# Patient Record
Sex: FEMALE | Race: WHITE | NOT HISPANIC OR LATINO | Employment: OTHER | URBAN - METROPOLITAN AREA
[De-identification: names, ages, dates, MRNs, and addresses within clinical notes are randomized per-mention and may not be internally consistent; named-entity substitution may affect disease eponyms.]

---

## 2017-01-27 ENCOUNTER — OFFICE VISIT (OUTPATIENT)
Dept: INTERNAL MEDICINE | Facility: CLINIC | Age: 82
End: 2017-01-27
Payer: COMMERCIAL

## 2017-01-27 VITALS
DIASTOLIC BLOOD PRESSURE: 80 MMHG | WEIGHT: 214.94 LBS | HEART RATE: 102 BPM | HEIGHT: 67 IN | SYSTOLIC BLOOD PRESSURE: 126 MMHG | TEMPERATURE: 98 F | BODY MASS INDEX: 33.74 KG/M2 | RESPIRATION RATE: 20 BRPM

## 2017-01-27 DIAGNOSIS — B02.8 HERPES ZOSTER WITH COMPLICATION: Primary | ICD-10-CM

## 2017-01-27 PROCEDURE — 3074F SYST BP LT 130 MM HG: CPT | Mod: S$GLB,,, | Performed by: INTERNAL MEDICINE

## 2017-01-27 PROCEDURE — 1157F ADVNC CARE PLAN IN RCRD: CPT | Mod: S$GLB,,, | Performed by: INTERNAL MEDICINE

## 2017-01-27 PROCEDURE — 99999 PR PBB SHADOW E&M-EST. PATIENT-LVL III: CPT | Mod: PBBFAC,,, | Performed by: INTERNAL MEDICINE

## 2017-01-27 PROCEDURE — 1160F RVW MEDS BY RX/DR IN RCRD: CPT | Mod: S$GLB,,, | Performed by: INTERNAL MEDICINE

## 2017-01-27 PROCEDURE — 1159F MED LIST DOCD IN RCRD: CPT | Mod: S$GLB,,, | Performed by: INTERNAL MEDICINE

## 2017-01-27 PROCEDURE — 1125F AMNT PAIN NOTED PAIN PRSNT: CPT | Mod: S$GLB,,, | Performed by: INTERNAL MEDICINE

## 2017-01-27 PROCEDURE — 3079F DIAST BP 80-89 MM HG: CPT | Mod: S$GLB,,, | Performed by: INTERNAL MEDICINE

## 2017-01-27 PROCEDURE — 99213 OFFICE O/P EST LOW 20 MIN: CPT | Mod: S$GLB,,, | Performed by: INTERNAL MEDICINE

## 2017-01-27 RX ORDER — VALACYCLOVIR HYDROCHLORIDE 1 G/1
1000 TABLET, FILM COATED ORAL 3 TIMES DAILY
Qty: 21 TABLET | Refills: 0 | Status: SHIPPED | OUTPATIENT
Start: 2017-01-27 | End: 2017-03-09 | Stop reason: ALTCHOICE

## 2017-01-27 RX ORDER — GABAPENTIN 300 MG/1
300 CAPSULE ORAL 3 TIMES DAILY
Qty: 90 CAPSULE | Refills: 1 | Status: SHIPPED | OUTPATIENT
Start: 2017-01-27 | End: 2017-03-09 | Stop reason: ALTCHOICE

## 2017-01-27 NOTE — PROGRESS NOTES
Subjective:       Patient ID: Harriet Rutherford is a 81 y.o. female.    Chief Complaint: Rash (buttocks and knee--- left side--- )    Patient is a 81 y.o.female who presents today for rash. It is present on her buttock and knee. It is patchy behind the knee but she feels the pain/ burning sensation in her buttock, left side as well.     Review of Systems   Constitutional: Negative for appetite change, chills, diaphoresis, fatigue and fever.   HENT: Negative for congestion, dental problem, ear discharge, ear pain, hearing loss, postnasal drip, sinus pressure and sore throat.    Eyes: Negative for discharge, redness and itching.   Respiratory: Negative for cough, chest tightness, shortness of breath and wheezing.    Cardiovascular: Negative for chest pain, palpitations and leg swelling.   Gastrointestinal: Negative for abdominal pain, constipation, diarrhea, nausea and vomiting.   Endocrine: Negative for cold intolerance and heat intolerance.   Genitourinary: Negative for difficulty urinating, frequency, hematuria and urgency.   Musculoskeletal: Negative for arthralgias, back pain, gait problem, myalgias and neck pain.   Skin: Positive for rash. Negative for color change.   Neurological: Negative for dizziness, syncope and headaches.   Hematological: Negative for adenopathy.   Psychiatric/Behavioral: Negative for behavioral problems and sleep disturbance. The patient is not nervous/anxious.        Objective:      Physical Exam   Constitutional: She is oriented to person, place, and time. She appears well-developed and well-nourished. No distress.   HENT:   Head: Normocephalic and atraumatic.   Right Ear: External ear normal.   Left Ear: External ear normal.   Eyes: Conjunctivae and EOM are normal. Pupils are equal, round, and reactive to light. Right eye exhibits no discharge. Left eye exhibits no discharge. No scleral icterus.   Neck: Normal range of motion. Neck supple. No JVD present. No thyromegaly present.    Cardiovascular: Normal rate, regular rhythm, normal heart sounds and intact distal pulses.  Exam reveals no gallop and no friction rub.    No murmur heard.  Pulmonary/Chest: Effort normal and breath sounds normal. No stridor. No respiratory distress. She has no wheezes. She has no rales. She exhibits no tenderness.   Abdominal: Soft. Bowel sounds are normal. She exhibits no distension. There is no tenderness. There is no rebound.   Musculoskeletal: Normal range of motion. She exhibits no edema or tenderness.   Lymphadenopathy:     She has no cervical adenopathy.   Neurological: She is alert and oriented to person, place, and time.   Skin: Skin is warm. Rash noted. She is not diaphoretic. No erythema.        Psychiatric: She has a normal mood and affect. Her behavior is normal.   Nursing note and vitals reviewed.      Assessment and Plan:       1. Herpes zoster with complication  - valtrex 1000 mg po tid x 7 days  - gabapentin 300 mg po tid prn pain  - Notify clinic if symptoms change, worsen or do not improve        Return in about 3 months (around 4/27/2017) for annual.

## 2017-02-14 ENCOUNTER — OFFICE VISIT (OUTPATIENT)
Dept: ENDOCRINOLOGY | Facility: CLINIC | Age: 82
End: 2017-02-14
Payer: COMMERCIAL

## 2017-02-14 VITALS
WEIGHT: 215.38 LBS | HEIGHT: 67 IN | HEART RATE: 101 BPM | DIASTOLIC BLOOD PRESSURE: 82 MMHG | SYSTOLIC BLOOD PRESSURE: 128 MMHG | BODY MASS INDEX: 33.8 KG/M2

## 2017-02-14 DIAGNOSIS — I15.2 HYPERTENSION DUE TO ENDOCRINE DISORDER: Primary | Chronic | ICD-10-CM

## 2017-02-14 DIAGNOSIS — D35.00 ADRENAL ADENOMA, UNSPECIFIED LATERALITY: ICD-10-CM

## 2017-02-14 PROCEDURE — 1159F MED LIST DOCD IN RCRD: CPT | Mod: S$GLB,,, | Performed by: INTERNAL MEDICINE

## 2017-02-14 PROCEDURE — 1126F AMNT PAIN NOTED NONE PRSNT: CPT | Mod: S$GLB,,, | Performed by: INTERNAL MEDICINE

## 2017-02-14 PROCEDURE — 99999 PR PBB SHADOW E&M-EST. PATIENT-LVL III: CPT | Mod: PBBFAC,,, | Performed by: INTERNAL MEDICINE

## 2017-02-14 PROCEDURE — 1157F ADVNC CARE PLAN IN RCRD: CPT | Mod: S$GLB,,, | Performed by: INTERNAL MEDICINE

## 2017-02-14 PROCEDURE — 99214 OFFICE O/P EST MOD 30 MIN: CPT | Mod: S$GLB,,, | Performed by: INTERNAL MEDICINE

## 2017-02-14 PROCEDURE — 3079F DIAST BP 80-89 MM HG: CPT | Mod: S$GLB,,, | Performed by: INTERNAL MEDICINE

## 2017-02-14 PROCEDURE — 1160F RVW MEDS BY RX/DR IN RCRD: CPT | Mod: S$GLB,,, | Performed by: INTERNAL MEDICINE

## 2017-02-14 PROCEDURE — 3074F SYST BP LT 130 MM HG: CPT | Mod: S$GLB,,, | Performed by: INTERNAL MEDICINE

## 2017-02-14 NOTE — PROGRESS NOTES
Subjective:      Patient ID: Harriet Rutherford is a 81 y.o. female.    Chief Complaint:  Hypothyroidism, pheochromocytoma, adrenal adenoma, hypothyrodism      History of Present Illness  Harrite Rutherford is a 81 y.o. female with medical history of adrenal adenoma and hypothyroidism. The patient states that during the MRI of the abdomen, it was noted that she had 0.2 cm left adrenal mass. This was followed up by CT scan and the CT scan confirmed that this is an adrenal adenoma. This was probably multiple small adrenal adenomas, washout being 77%. Recent urine metanephrine was positive, MIBG was done: Bilateral uptake in the adrenal glands worrisome for MIBG positive neoplasms. Pt is on chronic TCA (imipramine) for years. Patient denies headache, hyperhidrosis. The patient does have a history of hypertension for a number of years. She has at present well controlled blood pressure on amlodipine 10 mg daily and losartan 100 mg daily.     Hypothyroidism: last 0.69. Current levothyroxine dose at 150mcg. Denies signs of hyperthyroidism or hypothyroidism     She has osteoporosis of lumbar spine having had a compression fracture in the past at L2. The only cancer that she has had in the past is cervical cancer in [id number].       Review of Systems   Constitutional: Positive for activity change and fatigue. Negative for fever and unexpected weight change.   HENT: Negative for facial swelling, trouble swallowing and voice change.    Eyes: Negative for visual disturbance.   Respiratory: Positive for shortness of breath. Negative for cough, chest tightness and wheezing.    Cardiovascular: Negative for chest pain and palpitations.   Gastrointestinal: Negative for abdominal pain, constipation, diarrhea and nausea.   Endocrine: Negative.    Genitourinary: Positive for frequency and urgency.   Musculoskeletal: Positive for arthralgias and joint swelling.   Neurological: Negative for dizziness, syncope, speech difficulty, weakness and headaches.    Psychiatric/Behavioral: Positive for dysphoric mood and sleep disturbance. The patient is not nervous/anxious.        Objective:   Physical Exam   HENT:   Head: Normocephalic and atraumatic.   Neck: Normal range of motion. Neck supple. No thyromegaly present.   Cardiovascular: Normal rate, regular rhythm and normal heart sounds.  Exam reveals no friction rub.    No murmur heard.  Pulmonary/Chest: Effort normal and breath sounds normal. She has no wheezes. She has no rales.   Abdominal: Soft. Bowel sounds are normal. There is no tenderness.   Lymphadenopathy:     She has no cervical adenopathy.   Neuromuscular:Normal gait and stance. Not weak.  Right lower ext edematous    Lab Review:   No visits with results within 2 Month(s) from this visit.  Latest known visit with results is:    Lab Visit on 10/12/2016   Component Date Value    Sodium 10/12/2016 142     Potassium 10/12/2016 4.3     Chloride 10/12/2016 105     CO2 10/12/2016 27     Glucose 10/12/2016 103     BUN, Bld 10/12/2016 16     Creatinine 10/12/2016 0.8     Calcium 10/12/2016 9.4     Total Protein 10/12/2016 6.8     Albumin 10/12/2016 3.6     Total Bilirubin 10/12/2016 0.5     Alkaline Phosphatase 10/12/2016 158*    AST 10/12/2016 19     ALT 10/12/2016 24     Anion Gap 10/12/2016 10     eGFR if African American 10/12/2016 >60.0     eGFR if non African Amer* 10/12/2016 >60.0     Cholesterol 10/12/2016 193     Triglycerides 10/12/2016 157*    HDL 10/12/2016 57     LDL Cholesterol 10/12/2016 104.6     HDL/Chol Ratio 10/12/2016 29.5     Total Cholesterol/HDL Ra* 10/12/2016 3.4     Non-HDL Cholesterol 10/12/2016 136     TSH 10/12/2016 4.046*    Alkaline Phosphatase, To* 10/12/2016 137*    Bone %, Alk Phos 10/12/2016 30     Bone, Alk Phos 10/12/2016 41     Liver, Alk Phos 10/12/2016 86     Liver 1, Alk Phos 10/12/2016 63     Intestine %, Alk Phos 10/12/2016 7     Intestine, Alk Phos 10/12/2016 10     GGT 10/12/2016 49     Free  T4 10/12/2016 1.24        Assessment:     No diagnosis found.     Plan:     Adrenal adenoma, left  - test points toward pheochromocytoma, MIBG test was positive for bilat  - at this time after discussion with Dr. Moralez, we think that the MIBG was over read, unlike that she has bilat pheo, she also does not have malignant hypertension. No electrolytes abn, no headache  - Reassured patient and family that at this time that we will monitor, no indication for surgery  - she does take chronic TCA and that is an interfering with her metanephrine levels. Unclear why she is on TCA, bladder issue vs depression, pt herself does not know. Will not stop medication at this time  - Will follow closely with metanephrine  -     METANEPHRINES, PLASMA FREE; Future; Expected date: 8/11/16    Hypothyroidism, unspecified hypothyroidism type  - Controlled, checking TSH  -     TSH; Future; Expected date: 8/11/16    RTC in 6 mo with labs or earlier if needed

## 2017-02-14 NOTE — MR AVS SNAPSHOT
[facility]  [address]  [facility] [address]  Phone: [phone]  Fax: [phone]                  Harriet Rutherford   2017 3:00 PM   Office Visit    Description:  Female : 1935   Provider:  Hayden Moralez II, MD   Department:  [facility]ab           Reason for Visit     Adrenal Adenoma           Diagnoses this Visit        Comments    Hypertension due to endocrine disorder    -  Primary     Adrenal adenoma, unspecified laterality                To Do List           Future Appointments        Provider Department Dept Phone    2017 3:40 PM LAB, APPOINTMENT [place] [facility] [phone]    2017 12:30 PM [facility] CT1 64- LIMIT 450 LBS [facility] [phone]      Goals (5 Years of Data)     None      [facility]     [facility] Nurse Care Line -  Assistance  Registered nurses in the [facility] provide clinical advisement, health education, appointment booking, and other advisory services.  Call for this free service at [phone].             Medications           Message regarding Medications     Verify the changes and/or additions to your medication regime listed below are the same as discussed with your clinician today.  If any of these changes or additions are incorrect, please notify your healthcare provider.             Verify that the below list of medications is an accurate representation of the medications you are currently taking.  If none reported, the list may be blank. If incorrect, please contact your healthcare provider. Carry this list with you in case of emergency.           Current Medications     alendronate (FOSAMAX) 70 MG tablet Take 1 tablet once weekly in the morning with a full glass of water on an empty stomach. Do not consume food or lie down for at least 30 minutes afterwards.    amlodipine (NORVASC) 10 MG tablet Take 1 tablet (10 mg total) by mouth  "once daily.    aspirin (ECOTRIN) 81 MG EC tablet Take 81 mg by mouth once daily.    atorvastatin (LIPITOR) 40 MG tablet Take 1 tablet (40 mg total) by mouth once daily.    CA CITRATE/MGOX/VIT D3/B6/MIN (CITRACAL PLUS ORAL) Take 1,200 mg by mouth once daily.    escitalopram oxalate (LEXAPRO) 20 MG tablet Take 1 tablet (20 mg total) by mouth once daily.    gabapentin (NEURONTIN) 300 MG capsule Take 1 capsule (300 mg total) by mouth 3 (three) times daily.    hydrOXYzine HCl (ATARAX) 25 MG tablet Take 1 tablet (25 mg total) by mouth 3 (three) times daily as needed for Itching.    imipramine (TOFRANIL) 25 MG tablet TAKE 1 TABLET BY MOUTH THREE TIMES A DAY    levothyroxine (SYNTHROID) 150 MCG tablet Take 1 tablet (150 mcg total) by mouth once daily.    losartan (COZAAR) 100 MG tablet Take 1 tablet (100 mg total) by mouth once daily.    multivitamin capsule Take 1 capsule by mouth once daily.    tolterodine (DETROL LA) 4 MG 24 hr capsule Take 1 capsule (4 mg total) by mouth once daily.    valacyclovir (VALTREX) 1000 MG tablet Take 1 tablet (1,000 mg total) by mouth 3 (three) times daily.    zolpidem (AMBIEN) 5 MG Tab TAKE 1 TABLET AT BEDTIME AS NEEDED FOR INSOMNIA.           Clinical Reference Information           Your Vitals Were     BP Pulse Height Weight BMI    128/82 (BP Location: Right arm, Patient Position: Sitting, BP Method: Manual) 101 5' 7" (1.702 m) 97.7 kg (215 lb 6.2 oz) 33.73 kg/m2      Blood Pressure          Most Recent Value    BP  128/82      Allergies as of 2/14/2017     Lisinopril    Sulfur    Sulfa (Sulfonamide Antibiotics)      Immunizations Administered on Date of Encounter - 2/14/2017     None      Orders Placed During Today's Visit     Future Labs/Procedures Expected by Expires    ACTH  2/14/2017 4/15/2018    Cortisol  2/14/2017 4/15/2018    Creatinine, serum  2/14/2017 4/15/2018    Creatinine, serum  2/14/2017 4/15/2018    CT Abdomen Pelvis W Wo Contrast  2/14/2017 2/14/2018    METANEPHRINES, " PLASMA FREE  2/14/2017 4/15/2018      Language Assistance Services     ATTENTION: Language assistance services are available, free of charge. Please call [phone].      ATENCIÓN: Si habla miguel, tiene a quiroz disposición servicios gratuitos de asistencia lingüística. Llame al [phone].     CHÚ Ý: N?u b?n nói Ti?ng Vi?t, có các d?ch v? h? tr? ngôn ng? mi?n phí dành cho b?n. G?i s? [phone].         [facility] complies with applicable Federal civil rights laws and does not discriminate on the basis of race, color, national origin, age, disability, or sex.

## 2017-02-17 RX ORDER — ZOLPIDEM TARTRATE 5 MG/1
TABLET ORAL
Qty: 30 TABLET | Refills: 3 | Status: SHIPPED | OUTPATIENT
Start: 2017-02-17 | End: 2017-03-09

## 2017-02-18 ENCOUNTER — HOSPITAL ENCOUNTER (OUTPATIENT)
Dept: RADIOLOGY | Facility: HOSPITAL | Age: 82
Discharge: HOME | End: 2017-02-18
Attending: INTERNAL MEDICINE
Payer: COMMERCIAL

## 2017-02-18 DIAGNOSIS — D35.00 ADRENAL ADENOMA, UNSPECIFIED LATERALITY: ICD-10-CM

## 2017-02-18 DIAGNOSIS — I15.2 HYPERTENSION DUE TO ENDOCRINE DISORDER: Chronic | ICD-10-CM

## 2017-02-18 PROCEDURE — 74178 CT ABD&PLV WO CNTR FLWD CNTR: CPT | Mod: 26,,, | Performed by: RADIOLOGY

## 2017-02-18 PROCEDURE — 74178 CT ABD&PLV WO CNTR FLWD CNTR: CPT | Mod: TC

## 2017-02-18 PROCEDURE — 25500020 PHARM REV CODE 255: Performed by: INTERNAL MEDICINE

## 2017-02-18 RX ADMIN — IOHEXOL 100 ML: 350 INJECTION, SOLUTION INTRAVENOUS at 12:02

## 2017-02-18 RX ADMIN — IOHEXOL 15 ML: 350 INJECTION, SOLUTION INTRAVENOUS at 12:02

## 2017-03-07 ENCOUNTER — TELEPHONE (OUTPATIENT)
Dept: INTERNAL MEDICINE | Facility: CLINIC | Age: 82
End: 2017-03-07

## 2017-03-07 DIAGNOSIS — Z00.00 ANNUAL PHYSICAL EXAM: Primary | ICD-10-CM

## 2017-03-07 NOTE — TELEPHONE ENCOUNTER
----- Message from Aroldo Vázquez sent at 3/7/2017  4:18 PM CST -----  Contact: self [phone]  Doctor appointment and lab have been scheduled.  Please link lab orders to the lab appointment.  Date of doctor appointment:  05/08  Physical or EP:  Physical   Date of lab appointment:  05/03  Comments:

## 2017-03-09 ENCOUNTER — HOSPITAL ENCOUNTER (OUTPATIENT)
Dept: RADIOLOGY | Facility: HOSPITAL | Age: 82
Discharge: HOME | End: 2017-03-09
Attending: INTERNAL MEDICINE
Payer: COMMERCIAL

## 2017-03-09 ENCOUNTER — OFFICE VISIT (OUTPATIENT)
Dept: INTERNAL MEDICINE | Facility: CLINIC | Age: 82
End: 2017-03-09
Payer: COMMERCIAL

## 2017-03-09 VITALS
HEIGHT: 67 IN | SYSTOLIC BLOOD PRESSURE: 118 MMHG | RESPIRATION RATE: 20 BRPM | DIASTOLIC BLOOD PRESSURE: 84 MMHG | TEMPERATURE: 98 F | WEIGHT: 220.69 LBS | HEART RATE: 68 BPM | BODY MASS INDEX: 34.64 KG/M2

## 2017-03-09 DIAGNOSIS — G47.00 INSOMNIA, UNSPECIFIED TYPE: ICD-10-CM

## 2017-03-09 DIAGNOSIS — M79.89 SWELLING OF LOWER EXTREMITY: Primary | ICD-10-CM

## 2017-03-09 DIAGNOSIS — M79.89 SWELLING OF LOWER EXTREMITY: ICD-10-CM

## 2017-03-09 DIAGNOSIS — R53.83 FATIGUE, UNSPECIFIED TYPE: ICD-10-CM

## 2017-03-09 PROCEDURE — 71020 XR CHEST PA AND LATERAL: CPT | Mod: TC,PO

## 2017-03-09 PROCEDURE — 71020 XR CHEST PA AND LATERAL: CPT | Mod: 26,,, | Performed by: RADIOLOGY

## 2017-03-09 PROCEDURE — 3079F DIAST BP 80-89 MM HG: CPT | Mod: S$GLB,,, | Performed by: INTERNAL MEDICINE

## 2017-03-09 PROCEDURE — 1160F RVW MEDS BY RX/DR IN RCRD: CPT | Mod: S$GLB,,, | Performed by: INTERNAL MEDICINE

## 2017-03-09 PROCEDURE — 1157F ADVNC CARE PLAN IN RCRD: CPT | Mod: S$GLB,,, | Performed by: INTERNAL MEDICINE

## 2017-03-09 PROCEDURE — 99214 OFFICE O/P EST MOD 30 MIN: CPT | Mod: S$GLB,,, | Performed by: INTERNAL MEDICINE

## 2017-03-09 PROCEDURE — 99999 PR PBB SHADOW E&M-EST. PATIENT-LVL III: CPT | Mod: PBBFAC,,, | Performed by: INTERNAL MEDICINE

## 2017-03-09 PROCEDURE — 1126F AMNT PAIN NOTED NONE PRSNT: CPT | Mod: S$GLB,,, | Performed by: INTERNAL MEDICINE

## 2017-03-09 PROCEDURE — 1159F MED LIST DOCD IN RCRD: CPT | Mod: S$GLB,,, | Performed by: INTERNAL MEDICINE

## 2017-03-09 PROCEDURE — 3074F SYST BP LT 130 MM HG: CPT | Mod: S$GLB,,, | Performed by: INTERNAL MEDICINE

## 2017-03-09 RX ORDER — TEMAZEPAM 30 MG/1
30 CAPSULE ORAL NIGHTLY PRN
Qty: 30 CAPSULE | Refills: 0 | Status: SHIPPED | OUTPATIENT
Start: 2017-03-09 | End: 2017-04-09 | Stop reason: SDUPTHER

## 2017-03-09 NOTE — PROGRESS NOTES
Subjective:       Patient ID: Harriet Rutherford is a 81 y.o. female.    Chief Complaint: Edema (ankles)    Patient is a 81 y.o.female who presents today for leg swelling. It has been ongoing for 2 weeks. She denies increased salt intake. She denies shortness of breath, orthopnea or cough. She states that the swelling is always worse in the evening. She takes her amlodipine in the morning.  Review of Systems   Constitutional: Negative for appetite change, chills, diaphoresis, fatigue and fever.   HENT: Negative for congestion, dental problem, ear discharge, ear pain, hearing loss, postnasal drip, sinus pressure and sore throat.    Eyes: Negative for discharge, redness and itching.   Respiratory: Negative for cough, chest tightness and wheezing.    Cardiovascular: Positive for leg swelling. Negative for chest pain and palpitations.   Gastrointestinal: Negative for abdominal pain, constipation, diarrhea, nausea and vomiting.   Endocrine: Negative for cold intolerance and heat intolerance.   Genitourinary: Negative for difficulty urinating, frequency, hematuria and urgency.   Musculoskeletal: Negative for arthralgias, back pain, gait problem, myalgias and neck pain.   Skin: Negative for color change and rash.   Neurological: Negative for dizziness, syncope and headaches.   Hematological: Negative for adenopathy.   Psychiatric/Behavioral: Negative for behavioral problems and sleep disturbance. The patient is not nervous/anxious.        Objective:      Physical Exam   Constitutional: She is oriented to person, place, and time. She appears well-developed and well-nourished. No distress.   HENT:   Head: Normocephalic and atraumatic.   Right Ear: External ear normal.   Left Ear: External ear normal.   Eyes: Conjunctivae and EOM are normal. Pupils are equal, round, and reactive to light. Right eye exhibits no discharge. Left eye exhibits no discharge. No scleral icterus.   Neck: Normal range of motion. Neck supple. No JVD present.  No thyromegaly present.   Cardiovascular: Normal rate, regular rhythm, normal heart sounds and intact distal pulses.  Exam reveals no gallop and no friction rub.    No murmur heard.  Pulmonary/Chest: Effort normal and breath sounds normal. No stridor. No respiratory distress. She has no wheezes. She has no rales. She exhibits no tenderness.   Abdominal: Soft. Bowel sounds are normal. She exhibits no distension. There is no tenderness. There is no rebound.   Musculoskeletal: Normal range of motion. She exhibits no edema or tenderness.   Lymphadenopathy:     She has no cervical adenopathy.   1+ pitting edema bilaterally   Neurological: She is alert and oriented to person, place, and time.   Skin: Skin is warm. No rash noted. She is not diaphoretic. No erythema.   Psychiatric: She has a normal mood and affect. Her behavior is normal.   Nursing note and vitals reviewed.      Assessment and Plan:       1. Swelling of lower extremity  - rule out chf vs pad  - if results are negative, will start low dose diuretic and compression stockings  - advise taking 1/2 tab of amlodipine; monitor bp after doing so  - 2D Echo w/ Color Flow Doppler; Future  - CAR Ultrasound doppler venous legs bilat; Future  - Cardiology Lab US Lower Extremity Arteries Bilateral; Future  - X-Ray Chest PA And Lateral; Future  - Brain natriuretic peptide; Future    2. Fatigue, unspecified type  - CBC auto differential; Future  - Brain natriuretic peptide; Future  - TSH; Future  - Comprehensive metabolic panel; Future    3. Insomnia, unspecified type  - stop ambien  - temazepam (RESTORIL) 30 mg capsule; Take 1 capsule (30 mg total) by mouth nightly as needed for Insomnia.  Dispense: 30 capsule; Refill: 0        No Follow-up on file.

## 2017-03-10 ENCOUNTER — TELEPHONE (OUTPATIENT)
Dept: INTERNAL MEDICINE | Facility: CLINIC | Age: 82
End: 2017-03-10

## 2017-03-10 RX ORDER — LEVOTHYROXINE SODIUM 175 UG/1
175 TABLET ORAL DAILY
Qty: 30 TABLET | Refills: 11 | Status: SHIPPED | OUTPATIENT
Start: 2017-03-10 | End: 2018-03-25 | Stop reason: SDUPTHER

## 2017-03-10 NOTE — TELEPHONE ENCOUNTER
Notify pt:  - chest xray is clear  - no signs of heart failure on labs  - her tsh is very high; has she been taking her synthroid; if she has been, we need to increase the dosage; let me know if she is agreeable; this is likely the cause of her fatigue  - all other labs are stable

## 2017-03-10 NOTE — TELEPHONE ENCOUNTER
Pt agreeable to plan. Sent new levothyroxine dosage to her pharmacy. She voiced understanding to results.

## 2017-03-13 ENCOUNTER — TELEPHONE (OUTPATIENT)
Dept: INTERNAL MEDICINE | Facility: CLINIC | Age: 82
End: 2017-03-13

## 2017-03-13 ENCOUNTER — CLINICAL SUPPORT (OUTPATIENT)
Dept: CARDIOLOGY | Facility: CLINIC | Age: 82
End: 2017-03-13
Payer: COMMERCIAL

## 2017-03-13 DIAGNOSIS — M79.89 SWELLING OF LOWER EXTREMITY: ICD-10-CM

## 2017-03-13 PROCEDURE — 93970 EXTREMITY STUDY: CPT | Mod: S$GLB,,, | Performed by: INTERNAL MEDICINE

## 2017-03-13 NOTE — TELEPHONE ENCOUNTER
Notify pt: ultrasounds are neg for clots; she does have a cyst behind each knee; if not bothering her, we can leave it alone; if it does bother her, we can have her see ortho

## 2017-03-15 ENCOUNTER — CLINICAL SUPPORT (OUTPATIENT)
Dept: CARDIOLOGY | Facility: CLINIC | Age: 82
End: 2017-03-15
Payer: COMMERCIAL

## 2017-03-15 DIAGNOSIS — M79.89 SWELLING OF LOWER EXTREMITY: ICD-10-CM

## 2017-03-15 LAB
DIASTOLIC DYSFUNCTION: NO
ESTIMATED PA SYSTOLIC PRESSURE: 22.36
RETIRED EF AND QEF - SEE NOTES: 60 (ref 55–65)
TRICUSPID VALVE REGURGITATION: NORMAL

## 2017-03-15 PROCEDURE — 93306 TTE W/DOPPLER COMPLETE: CPT | Mod: S$GLB,,, | Performed by: INTERNAL MEDICINE

## 2017-03-15 PROCEDURE — 93925 LOWER EXTREMITY STUDY: CPT | Mod: S$GLB,,, | Performed by: INTERNAL MEDICINE

## 2017-03-16 ENCOUNTER — TELEPHONE (OUTPATIENT)
Dept: INTERNAL MEDICINE | Facility: CLINIC | Age: 82
End: 2017-03-16

## 2017-03-16 DIAGNOSIS — I73.9 PAD (PERIPHERAL ARTERY DISEASE): Primary | ICD-10-CM

## 2017-03-16 NOTE — TELEPHONE ENCOUNTER
Notify pt that her arterial ultrasound does reveal narrowing in the arteries; continue asa and atorvastatin daily; recommend vascular referral ; order placed

## 2017-03-20 RX ORDER — IMIPRAMINE HYDROCHLORIDE 25 MG/1
TABLET, FILM COATED ORAL
Qty: 90 TABLET | Refills: 8 | Status: SHIPPED | OUTPATIENT
Start: 2017-03-20 | End: 2017-12-25 | Stop reason: SDUPTHER

## 2017-03-29 DIAGNOSIS — I73.9 PAD (PERIPHERAL ARTERY DISEASE): Primary | ICD-10-CM

## 2017-03-31 ENCOUNTER — INITIAL CONSULT (OUTPATIENT)
Dept: VASCULAR SURGERY | Facility: CLINIC | Age: 82
End: 2017-03-31
Payer: COMMERCIAL

## 2017-03-31 ENCOUNTER — HOSPITAL ENCOUNTER (OUTPATIENT)
Dept: VASCULAR SURGERY | Facility: CLINIC | Age: 82
Discharge: HOME | End: 2017-03-31
Payer: COMMERCIAL

## 2017-03-31 VITALS
HEIGHT: 67 IN | WEIGHT: 219 LBS | BODY MASS INDEX: 34.37 KG/M2 | HEART RATE: 93 BPM | DIASTOLIC BLOOD PRESSURE: 79 MMHG | SYSTOLIC BLOOD PRESSURE: 144 MMHG | TEMPERATURE: 97 F

## 2017-03-31 DIAGNOSIS — I73.9 PAD (PERIPHERAL ARTERY DISEASE): ICD-10-CM

## 2017-03-31 DIAGNOSIS — M79.89 LEG SWELLING: ICD-10-CM

## 2017-03-31 PROCEDURE — 1160F RVW MEDS BY RX/DR IN RCRD: CPT | Mod: S$GLB,,, | Performed by: SURGERY

## 2017-03-31 PROCEDURE — 99999 PR PBB SHADOW E&M-EST. PATIENT-LVL III: CPT | Mod: PBBFAC,,, | Performed by: SURGERY

## 2017-03-31 PROCEDURE — 93923 UPR/LXTR ART STDY 3+ LVLS: CPT | Mod: S$GLB,,, | Performed by: SURGERY

## 2017-03-31 PROCEDURE — 1126F AMNT PAIN NOTED NONE PRSNT: CPT | Mod: S$GLB,,, | Performed by: SURGERY

## 2017-03-31 PROCEDURE — 1159F MED LIST DOCD IN RCRD: CPT | Mod: S$GLB,,, | Performed by: SURGERY

## 2017-03-31 PROCEDURE — 3078F DIAST BP <80 MM HG: CPT | Mod: S$GLB,,, | Performed by: SURGERY

## 2017-03-31 PROCEDURE — 3077F SYST BP >= 140 MM HG: CPT | Mod: S$GLB,,, | Performed by: SURGERY

## 2017-03-31 PROCEDURE — 99205 OFFICE O/P NEW HI 60 MIN: CPT | Mod: S$GLB,,, | Performed by: SURGERY

## 2017-03-31 PROCEDURE — 1157F ADVNC CARE PLAN IN RCRD: CPT | Mod: S$GLB,,, | Performed by: SURGERY

## 2017-03-31 NOTE — MR AVS SNAPSHOT
[facility] - Vascular Surgery  [address]  [facility] [address]  Phone: [phone]  Fax: [phone]                  Harriet Rutherford   3/31/2017 9:45 AM   Initial consult    Description:  Female : 1935   Provider:  DANIEL Mckeon III, MD   Department:  [facility] - Vascular Surgery           Reason for Visit     Consult           Diagnoses this Visit        Comments    Leg swelling                To Do List           Future Appointments        Provider Department Dept Phone    5/3/2017 9:00 AM LAB, SAME DAY [facility] [phone]    5/3/2017 9:15 AM SPECIMEN, MAIN CAMPUS [facility] [phone]    2017 1:00 PM Arpita Guevara DO [place] - Internal Medicine [phone]      Goals (5 Years of Data)     None      [facility] On Call     [facility] On [facility] Nurse Care Line -  Assistance  Unless otherwise directed by your provider, please contact [facility], our nurse care line that is available for  assistance.     Registered nurses in the [facility] provide: appointment scheduling, clinical advisement, health education, and other advisory services.  Call: [phone] (toll free)               Medications           Message regarding Medications     Verify the changes and/or additions to your medication regime listed below are the same as discussed with your clinician today.  If any of these changes or additions are incorrect, please notify your healthcare provider.             Verify that the below list of medications is an accurate representation of the medications you are currently taking.  If none reported, the list may be blank. If incorrect, please contact your healthcare provider. Carry this list with you in case of emergency.           Current Medications     alendronate (FOSAMAX) 70 MG tablet Take 1 tablet once weekly in the morning with a full glass of water on an empty stomach. Do not consume food or lie down for at least  "30 minutes afterwards.    amlodipine (NORVASC) 10 MG tablet Take 1 tablet (10 mg total) by mouth once daily.    aspirin (ECOTRIN) 81 MG EC tablet Take 81 mg by mouth once daily.    atorvastatin (LIPITOR) 40 MG tablet Take 1 tablet (40 mg total) by mouth once daily.    CA CITRATE/MGOX/VIT D3/B6/MIN (CITRACAL PLUS ORAL) Take 1,200 mg by mouth once daily.    escitalopram oxalate (LEXAPRO) 20 MG tablet Take 1 tablet (20 mg total) by mouth once daily.    hydrOXYzine HCl (ATARAX) 25 MG tablet Take 1 tablet (25 mg total) by mouth 3 (three) times daily as needed for Itching.    imipramine (TOFRANIL) 25 MG tablet TAKE 1 TABLET BY MOUTH THREE TIMES A DAY    levothyroxine (SYNTHROID, LEVOTHROID) 175 MCG tablet Take 1 tablet (175 mcg total) by mouth once daily.    losartan (COZAAR) 100 MG tablet Take 1 tablet (100 mg total) by mouth once daily.    multivitamin capsule Take 1 capsule by mouth once daily.    temazepam (RESTORIL) 30 mg capsule Take 1 capsule (30 mg total) by mouth nightly as needed for Insomnia.    tolterodine (DETROL LA) 4 MG 24 hr capsule Take 1 capsule (4 mg total) by mouth once daily.           Clinical Reference Information           Your Vitals Were     BP Pulse Temp Height Weight BMI    144/79 (BP Location: Right arm, Patient Position: Sitting, BP Method: Automatic) 93 97.3 °F (36.3 °C) (Oral) 5' 7" (1.702 m) 99.3 kg (219 lb) 34.3 kg/m2      Blood Pressure          Most Recent Value    Right Arm BP - Sitting  144/79    Left Arm BP - Sitting  150/86    BP  (!)  144/79      Allergies as of 3/31/2017     Lisinopril    Sulfur    Sulfa (Sulfonamide Antibiotics)      Immunizations Administered on Date of Encounter - 3/31/2017     None      Language Assistance Services     ATTENTION: Language assistance services are available, free of charge. Please call [phone].      ATENCIÓN: Si silkela miguel, tiene a quiroz disposición servicios gratuitos de asistencia lingüística. Llame al [phone].     CHÚ Ý: N?u b?n " nói Ti?ng Vi?t, có các d?ch v? h? tr? ngôn ng? mi?n phí dành cho b?n. G?i s? [phone].         [facility] complies with applicable Federal civil rights laws and does not discriminate on the basis of race, color, national origin, age, disability, or sex.

## 2017-03-31 NOTE — LETTER
March 31, 2017      Arpita Guevara, DO  [address] [facility] [place] [address]           [facility] - Vascular Surgery  [address]  [place] [address]  Phone: [phone]  Fax: [phone]          Patient: Harriet Rutherford   MR Number: [id number]   YOB: 1935   Date of Visit: 3/31/2017       Dear Dr. Arpita Guevara:    Thank you for referring Harriet Rutherford to me for evaluation. Attached you will find relevant portions of my assessment and plan of care.    If you have questions, please do not hesitate to call me. I look forward to following Harriet Rutherford along with you.    Sincerely,    DANIEL Mckeon III, MD    Enclosure  CC:  No Recipients    If you would like to receive this communication electronically, please contact [email or web address] or [phone] to request more information on [employer] Link access.    For providers and/or their staff who would like to refer a patient to [facility], please contact us through our one-stop-shop provider referral line, [facility], at [phone].    If you feel you have received this communication in error or would no longer like to receive these types of communications, please e-mail [email or web address]

## 2017-03-31 NOTE — PROGRESS NOTES
HISTORY OF PRESENT ILLNESS:  A 81-year-old female sent for evaluation of leg   swelling and an ultrasound finding of mild-to-moderate left external iliac   artery stenosis.    She has an eight-year history of right foot swelling, which began after a foot   surgery and never completely went away.  Over the last month, she says that this   swelling has worsened and also having some swelling in her left leg, but not   quite as bad.    She states that she has bilateral thigh and calf weariness after walking 300   feet.  Importantly, she must either sit down to make this go away or hunch over.    She has known lumbar spine issues and also has chronic back pain.    PAST MEDICAL HISTORY:  1.  Hyperlipidemia.  2.  Hypertension.  3.  Hypothyroidism.  4.  Smoking.  5.  Stress incontinence.    PAST SURGICAL HISTORY:  1.  Bladder surgery.  2.  Cystoscopy.  3.  Hysterectomy.  4.  Foot surgery as above.    FAMILY HISTORY:  Positive for coronary artery disease.    SOCIAL HISTORY:  She is a former smoker quitting in 2016.    MEDICATIONS:  Include aspirin and statin.  See EPIC for full list.    ALLERGIES:  Lisinopril and sulfur.    REVIEW OF SYSTEMS:  Denies postprandial pain or DVT.  All other systems   including eyes, ENT, respiratory, , musculoskeletal, breast, psychiatric,   lymph, allergy and immune are negative.    PHYSICAL EXAMINATION:  VITAL SIGNS:  See nursing note.  GENERAL:  She is in no acute distress.  RESPIRATORY:  Normal effort.  Clear to auscultation.  CARDIOVASCULAR:  Regular rhythm, nondisplaced PMI, no murmur.  VASCULAR:  2+ radial, brachial, femoral pulses.  DP pulses are strong, 2+   bilaterally.  EXTREMITIES:  Demonstrate generalized edema in the ankle and calf, right modest   worse than the left.  There are no varicosities, ulceration or hyperpigmentation   in the gaiter area.  ABDOMEN:  No masses or tenderness.  No hepatosplenomegaly.  Aorta cannot be   palpated.  EYES:  Normal conjunctivae and lids.  ENT:   Fair dentition.  NECK:  No JVD or thyromegaly.  MUSCULOSKELETAL:  No sign of kyphosis or scoliosis.  EXTREMITIES: Without clubbing or cyanosis.  SKIN:  Warm and dry.  NEUROLOGIC:  Alert and oriented x3.  Normal mood and affect.  Midline tongue.    No speech difficulty or hoarseness and 5/5 motor strength in all extremities.    IMAGING:  ABIs are 1.1 on the right and 1.09 on the left.  PVRs are consistent   with minimal occlusive disease only.    Arterial duplex (Cardiology) suggests mild-to-moderate left external iliac   stenosis with velocity of 232.    Prior venous duplex shows no evidence of DVT.    ASSESSMENT:  1.  Idiopathic leg swelling, right greater than left.  She has no deep venous   occlusion.  The right side is chronic for the last eight years, although   bilaterally worse over the last month.  2.  No significant peripheral arterial occlusive disease.  I believe that her   ambulatory leg symptoms are very unlikely to be vascular in origin, more likely   neurologic from her back.    I do not believe that the isolated modest left external iliac artery stenosis is   causing any of these symptoms and I would not recommend any intervention for   it.    RECOMMENDATION:  Follow up with her primary care physician regarding post   changes in her medical regimen for the leg swelling.          /vijay [id number] jama(s)        BRIDGER  dd: 03/31/2017 10:31:54 (CDT)  td: 04/01/2017 03:12:41 (CDT)  Doc ID   #[id number]  Job ID #[id number]    CC:

## 2017-04-09 DIAGNOSIS — G47.00 INSOMNIA, UNSPECIFIED TYPE: ICD-10-CM

## 2017-04-10 RX ORDER — TEMAZEPAM 30 MG/1
CAPSULE ORAL
Qty: 30 CAPSULE | Refills: 3 | Status: SHIPPED | OUTPATIENT
Start: 2017-04-10 | End: 2017-05-08 | Stop reason: SDUPTHER

## 2017-04-19 RX ORDER — TOLTERODINE 4 MG/1
CAPSULE, EXTENDED RELEASE ORAL
Qty: 30 CAPSULE | Refills: 10 | Status: SHIPPED | OUTPATIENT
Start: 2017-04-19 | End: 2018-04-24 | Stop reason: SDUPTHER

## 2017-04-19 RX ORDER — AMLODIPINE BESYLATE 10 MG/1
TABLET ORAL
Qty: 30 TABLET | Refills: 10 | Status: SHIPPED | OUTPATIENT
Start: 2017-04-19 | End: 2017-05-08 | Stop reason: DRUGHIGH

## 2017-04-19 RX ORDER — ESCITALOPRAM OXALATE 20 MG/1
TABLET ORAL
Qty: 30 TABLET | Refills: 10 | Status: SHIPPED | OUTPATIENT
Start: 2017-04-19 | End: 2018-04-24 | Stop reason: SDUPTHER

## 2017-05-03 ENCOUNTER — LAB VISIT (OUTPATIENT)
Dept: LAB | Facility: HOSPITAL | Age: 82
End: 2017-05-03
Payer: COMMERCIAL

## 2017-05-03 DIAGNOSIS — Z00.00 ANNUAL PHYSICAL EXAM: ICD-10-CM

## 2017-05-03 LAB
25(OH)D3+25(OH)D2 SERPL-MCNC: 30 NG/ML
ALBUMIN SERPL BCP-MCNC: 3.7 G/DL
ALP SERPL-CCNC: 168 U/L
ALT SERPL W/O P-5'-P-CCNC: 28 U/L
ANION GAP SERPL CALC-SCNC: 11 MMOL/L
AST SERPL-CCNC: 24 U/L
BASOPHILS # BLD AUTO: 0.04 K/UL
BASOPHILS NFR BLD: 0.6 %
BILIRUB SERPL-MCNC: 0.4 MG/DL
BUN SERPL-MCNC: 20 MG/DL
CALCIUM SERPL-MCNC: 9.4 MG/DL
CHLORIDE SERPL-SCNC: 105 MMOL/L
CHOLEST/HDLC SERPL: 4.2 {RATIO}
CO2 SERPL-SCNC: 24 MMOL/L
CREAT SERPL-MCNC: 0.8 MG/DL
DIFFERENTIAL METHOD: NORMAL
EOSINOPHIL # BLD AUTO: 0.2 K/UL
EOSINOPHIL NFR BLD: 2.3 %
ERYTHROCYTE [DISTWIDTH] IN BLOOD BY AUTOMATED COUNT: 13.5 %
EST. GFR  (AFRICAN AMERICAN): >60 ML/MIN/1.73 M^2
EST. GFR  (NON AFRICAN AMERICAN): >60 ML/MIN/1.73 M^2
GLUCOSE SERPL-MCNC: 101 MG/DL
HCT VFR BLD AUTO: 40.6 %
HDL/CHOLESTEROL RATIO: 24.1 %
HDLC SERPL-MCNC: 191 MG/DL
HDLC SERPL-MCNC: 46 MG/DL
HGB BLD-MCNC: 13.3 G/DL
LDLC SERPL CALC-MCNC: 97 MG/DL
LYMPHOCYTES # BLD AUTO: 2 K/UL
LYMPHOCYTES NFR BLD: 31.1 %
MCH RBC QN AUTO: 29.6 PG
MCHC RBC AUTO-ENTMCNC: 32.8 %
MCV RBC AUTO: 90 FL
MONOCYTES # BLD AUTO: 0.8 K/UL
MONOCYTES NFR BLD: 12.1 %
NEUTROPHILS # BLD AUTO: 3.5 K/UL
NEUTROPHILS NFR BLD: 53.7 %
NONHDLC SERPL-MCNC: 145 MG/DL
PLATELET # BLD AUTO: 313 K/UL
PMV BLD AUTO: 9.2 FL
POTASSIUM SERPL-SCNC: 4.1 MMOL/L
PROT SERPL-MCNC: 7 G/DL
RBC # BLD AUTO: 4.5 M/UL
SODIUM SERPL-SCNC: 140 MMOL/L
TRIGL SERPL-MCNC: 240 MG/DL
TSH SERPL DL<=0.005 MIU/L-ACNC: 2.14 UIU/ML
WBC # BLD AUTO: 6.52 K/UL

## 2017-05-03 PROCEDURE — 36415 COLL VENOUS BLD VENIPUNCTURE: CPT

## 2017-05-03 PROCEDURE — 82306 VITAMIN D 25 HYDROXY: CPT

## 2017-05-03 PROCEDURE — 85025 COMPLETE CBC W/AUTO DIFF WBC: CPT

## 2017-05-03 PROCEDURE — 80053 COMPREHEN METABOLIC PANEL: CPT

## 2017-05-03 PROCEDURE — 84443 ASSAY THYROID STIM HORMONE: CPT

## 2017-05-03 PROCEDURE — 80061 LIPID PANEL: CPT

## 2017-05-08 ENCOUNTER — OFFICE VISIT (OUTPATIENT)
Dept: INTERNAL MEDICINE | Facility: CLINIC | Age: 82
End: 2017-05-08
Payer: COMMERCIAL

## 2017-05-08 VITALS
DIASTOLIC BLOOD PRESSURE: 80 MMHG | BODY MASS INDEX: 33.84 KG/M2 | TEMPERATURE: 99 F | HEIGHT: 67 IN | SYSTOLIC BLOOD PRESSURE: 126 MMHG | WEIGHT: 215.63 LBS | HEART RATE: 72 BPM | RESPIRATION RATE: 16 BRPM

## 2017-05-08 DIAGNOSIS — G47.00 INSOMNIA, UNSPECIFIED TYPE: ICD-10-CM

## 2017-05-08 DIAGNOSIS — Z00.00 ANNUAL PHYSICAL EXAM: Primary | ICD-10-CM

## 2017-05-08 DIAGNOSIS — I15.2 HYPERTENSION DUE TO ENDOCRINE DISORDER: Chronic | ICD-10-CM

## 2017-05-08 DIAGNOSIS — E03.9 HYPOTHYROIDISM, UNSPECIFIED TYPE: Chronic | ICD-10-CM

## 2017-05-08 DIAGNOSIS — D35.00 ADRENAL ADENOMA, UNSPECIFIED LATERALITY: ICD-10-CM

## 2017-05-08 DIAGNOSIS — E78.5 HYPERLIPIDEMIA, UNSPECIFIED HYPERLIPIDEMIA TYPE: Chronic | ICD-10-CM

## 2017-05-08 DIAGNOSIS — K76.0 NAFLD (NONALCOHOLIC FATTY LIVER DISEASE): ICD-10-CM

## 2017-05-08 DIAGNOSIS — R82.90 ABNORMAL URINE: ICD-10-CM

## 2017-05-08 DIAGNOSIS — R60.0 EDEMA OF LOWER EXTREMITY, UNSPECIFIED LATERALITY: ICD-10-CM

## 2017-05-08 DIAGNOSIS — I73.9 PAD (PERIPHERAL ARTERY DISEASE): ICD-10-CM

## 2017-05-08 DIAGNOSIS — Z12.31 VISIT FOR SCREENING MAMMOGRAM: ICD-10-CM

## 2017-05-08 PROCEDURE — 3074F SYST BP LT 130 MM HG: CPT | Mod: S$GLB,,, | Performed by: INTERNAL MEDICINE

## 2017-05-08 PROCEDURE — 99397 PER PM REEVAL EST PAT 65+ YR: CPT | Mod: S$GLB,,, | Performed by: INTERNAL MEDICINE

## 2017-05-08 PROCEDURE — 3079F DIAST BP 80-89 MM HG: CPT | Mod: S$GLB,,, | Performed by: INTERNAL MEDICINE

## 2017-05-08 PROCEDURE — 99999 PR PBB SHADOW E&M-EST. PATIENT-LVL III: CPT | Mod: PBBFAC,,, | Performed by: INTERNAL MEDICINE

## 2017-05-08 RX ORDER — HYDROCHLOROTHIAZIDE 12.5 MG/1
12.5 CAPSULE ORAL DAILY
Qty: 30 CAPSULE | Refills: 11 | Status: SHIPPED | OUTPATIENT
Start: 2017-05-08 | End: 2017-12-05

## 2017-05-08 RX ORDER — TEMAZEPAM 30 MG/1
30 CAPSULE ORAL NIGHTLY
Qty: 30 CAPSULE | Refills: 3 | Status: SHIPPED | OUTPATIENT
Start: 2017-05-08 | End: 2017-11-25 | Stop reason: SDUPTHER

## 2017-05-08 RX ORDER — ATORVASTATIN CALCIUM 40 MG/1
40 TABLET, FILM COATED ORAL DAILY
Qty: 30 TABLET | Refills: 6 | Status: SHIPPED | OUTPATIENT
Start: 2017-05-08 | End: 2017-05-19 | Stop reason: SDUPTHER

## 2017-05-08 RX ORDER — AMLODIPINE BESYLATE 5 MG/1
5 TABLET ORAL DAILY
Qty: 30 TABLET | Refills: 11 | Status: SHIPPED | OUTPATIENT
Start: 2017-05-08 | End: 2018-04-24

## 2017-05-08 NOTE — PROGRESS NOTES
Subjective:       Patient ID: Harriet Rutherford is a 81 y.o. female.    Chief Complaint: Annual Exam    Patient is a 81 y.o.female who presents today for annual.    Cholesterol: (reviewed)  Vaccines: Influenza (yearly); Tetanus (2009) ; Pneumovax (2009); Zoster (2009); Prevnar ( up to date)  Eye exam: sept 2015  Mammogram: due now  Gyn exam: hysterectomy  Colonoscopy: 2016; repeat five years  DEXA: 2016    Review of Systems   Constitutional: Negative for appetite change, chills, diaphoresis, fatigue and fever.   HENT: Negative for congestion, dental problem, ear discharge, ear pain, hearing loss, postnasal drip, sinus pressure and sore throat.    Eyes: Negative for discharge, redness and itching.   Respiratory: Negative for cough, chest tightness, shortness of breath and wheezing.    Cardiovascular: Positive for leg swelling. Negative for chest pain and palpitations.   Gastrointestinal: Negative for abdominal pain, constipation, diarrhea, nausea and vomiting.   Endocrine: Negative for cold intolerance and heat intolerance.   Genitourinary: Negative for difficulty urinating, frequency, hematuria and urgency.   Musculoskeletal: Negative for arthralgias, back pain, gait problem, myalgias and neck pain.   Skin: Negative for color change and rash.   Neurological: Negative for dizziness, syncope and headaches.   Hematological: Negative for adenopathy.   Psychiatric/Behavioral: Negative for behavioral problems and sleep disturbance. The patient is not nervous/anxious.        Objective:      Physical Exam   Constitutional: She is oriented to person, place, and time. She appears well-developed and well-nourished. No distress.   HENT:   Head: Normocephalic and atraumatic.   Right Ear: External ear normal.   Left Ear: External ear normal.   Eyes: Conjunctivae and EOM are normal. Pupils are equal, round, and reactive to light. Right eye exhibits no discharge. Left eye exhibits no discharge. No scleral icterus.   Neck: Normal range of  motion. Neck supple. No JVD present. No thyromegaly present.   Cardiovascular: Normal rate, regular rhythm, normal heart sounds and intact distal pulses.  Exam reveals no gallop and no friction rub.    No murmur heard.  Pulmonary/Chest: Effort normal and breath sounds normal. No stridor. No respiratory distress. She has no wheezes. She has no rales. She exhibits no tenderness.   Abdominal: Soft. Bowel sounds are normal. She exhibits no distension. There is no tenderness. There is no rebound.   Musculoskeletal: Normal range of motion. She exhibits no edema or tenderness.   Lymphadenopathy:     She has no cervical adenopathy.   2+ pitting edema b/l   Neurological: She is alert and oriented to person, place, and time.   Skin: Skin is warm. No rash noted. She is not diaphoretic. No erythema.   Psychiatric: She has a normal mood and affect. Her behavior is normal.   Nursing note and vitals reviewed.      Assessment and Plan:       1. 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 PAD (peripheral artery disease)  - continue statin and asa    4. Adrenal adenoma, unspecified laterality  - CT in feb was stable    5. Hypothyroidism, unspecified type  - stable on synthroid    6. Hyperlipidemia, unspecified hyperlipidemia type  - triglycerides elevated; advised low fat diet  - atorvastatin (LIPITOR) 40 MG tablet; Take 1 tablet (40 mg total) by mouth once daily.  Dispense: 30 tablet; Refill: 6    7. NAFLD (nonalcoholic fatty liver disease)  - Patient educated on importance of diet and exercise.  Recommended 30-45 minutes of exercise five days a week.  In addition, counseled patient on importance of low fat diet.  Limit carbohydrate intake.  Increase protein intake and vegetables.     8. Insomnia, unspecified type  - temazepam (RESTORIL) 30 mg capsule; Take 1 capsule (30 mg total) by mouth  nightly.  Dispense: 30 capsule; Refill: 3    9. Edema of lower extremity, unspecified laterality  - COMPRESSION STOCKINGS  - Basic metabolic panel; Future    10. Abnormal urine  - Urinalysis; Future    11. Visit for screening mammogram  - Mammo Digital Screening Bilat with CAD; Future    - start hctz 12.5 mg po daily ; check bmp in one month and urine        No Follow-up on file.

## 2017-05-10 ENCOUNTER — HOSPITAL ENCOUNTER (OUTPATIENT)
Dept: RADIOLOGY | Facility: HOSPITAL | Age: 82
Discharge: HOME | End: 2017-05-10
Attending: INTERNAL MEDICINE
Payer: COMMERCIAL

## 2017-05-10 ENCOUNTER — OFFICE VISIT (OUTPATIENT)
Dept: OPTOMETRY | Facility: CLINIC | Age: 82
End: 2017-05-10
Payer: COMMERCIAL

## 2017-05-10 DIAGNOSIS — H04.123 DRY EYES, BILATERAL: Primary | ICD-10-CM

## 2017-05-10 DIAGNOSIS — H26.493 CLOUDY POSTERIOR CAPSULE, BILATERAL: ICD-10-CM

## 2017-05-10 DIAGNOSIS — Z12.31 VISIT FOR SCREENING MAMMOGRAM: ICD-10-CM

## 2017-05-10 DIAGNOSIS — Z13.5 SCREENING FOR GLAUCOMA: ICD-10-CM

## 2017-05-10 PROCEDURE — 92014 COMPRE OPH EXAM EST PT 1/>: CPT | Mod: S$GLB,,, | Performed by: OPTOMETRIST

## 2017-05-10 PROCEDURE — 77063 BREAST TOMOSYNTHESIS BI: CPT | Mod: 26,,, | Performed by: RADIOLOGY

## 2017-05-10 PROCEDURE — 77067 SCR MAMMO BI INCL CAD: CPT | Mod: TC

## 2017-05-10 PROCEDURE — 77067 SCR MAMMO BI INCL CAD: CPT | Mod: 26,,, | Performed by: RADIOLOGY

## 2017-05-10 PROCEDURE — 99999 PR PBB SHADOW E&M-EST. PATIENT-LVL II: CPT | Mod: PBBFAC,,, | Performed by: OPTOMETRIST

## 2017-05-10 PROCEDURE — 92015 DETERMINE REFRACTIVE STATE: CPT | Mod: S$GLB,,, | Performed by: OPTOMETRIST

## 2017-05-10 RX ORDER — FLUOROMETHOLONE 1 MG/ML
1 SUSPENSION/ DROPS OPHTHALMIC 4 TIMES DAILY
Qty: 5 ML | Refills: 1 | Status: SHIPPED | OUTPATIENT
Start: 2017-05-10 | End: 2017-05-20

## 2017-05-10 NOTE — PROGRESS NOTES
HPI     DLS: 9/4/2015  Pt states for 2 weeks both eye OD>OS has been burning and red  +Crusting   Tender in the corners of eyes  +Burning   Wear +2.50 otc readers. States distance va is decreasing.  Denies tearing    Blink ou qhs        Last edited by Jennie Prieto on 5/10/2017  9:07 AM.     ROS     Positive for: Endocrine, Eyes    Negative for: Constitutional, Gastrointestinal, Neurological, Skin,   Genitourinary, Musculoskeletal, HENT, Cardiovascular, Respiratory,   Psychiatric, Allergic/Imm, Heme/Lymph    Last edited by Tray Stratton, OD on 5/10/2017  9:21 AM. (History)        Assessment /Plan     For exam results, see Encounter Report.    Dry eyes, bilateral  -     fluorometholone 0.1% (FML) 0.1 % DrpS; Place 1 drop into both eyes 4 (four) times daily.  Dispense: 5 mL; Refill: 1    Cloudy posterior capsule, bilateral    Screening for glaucoma      1. Cloudy capsule OU sp pciol  2. JOHN--sx not relieved on ATs.  Will do short course of FML    PLAN:    1. FML QID OU  2. rtc 2 weeks for FU.  If better will taper, and switch back to FML  3. Today set up appt in one month to see Dr Parikh for YAG OU

## 2017-05-10 NOTE — MR AVS SNAPSHOT
[place] [facility]   [facility]  [place] [address]  Phone: [phone]  Fax: [phone]                  Harriet Rutherford   5/10/2017 9:00 AM   Office Visit    Description:  Female : 1935   Provider:  Tray Stratton OD   Department:  [place] [facility]           Reason for Visit     Concerns About Ocular Health           Diagnoses this Visit        Comments    Dry eyes, bilateral    -  Primary     Cloudy posterior capsule, bilateral         Screening for glaucoma                To Do List           Future Appointments        Provider Department Dept Phone    5/10/2017 11:40 AM [facility] [phone]      Goals (5 Years of Data)     None      Follow-Up and Disposition     Return in about 2 weeks (around 2017).       These Medications        Disp Refills Start End    fluorometholone 0.1% (FML) 0.1 % DrpS 5 mL 1 5/10/2017 2017    Place 1 drop into both eyes 4 (four) times daily. - Both Eyes    [facility]: [id number] - [place], [place] - [address] #: [phone]         [facility]     [facility] Nurse Care Line -  Assistance  Unless otherwise directed by your provider, please contact [facility], our nurse care line that is available for  assistance.     Registered nurses in the [facility] provide: appointment scheduling, clinical advisement, health education, and other advisory services.  Call: [phone] (toll free)               Medications           Message regarding Medications     Verify the changes and/or additions to your medication regime listed below are the same as discussed with your clinician today.  If any of these changes or additions are incorrect, please notify your healthcare provider.        START taking these NEW medications        Refills    fluorometholone 0.1% (FML) 0.1 % DrpS 1    Sig: Place 1 drop into both eyes 4 (four) times daily.    Class: Normal    Route:  Both Eyes           Verify that the below list of medications is an accurate representation of the medications you are currently taking.  If none reported, the list may be blank. If incorrect, please contact your healthcare provider. Carry this list with you in case of emergency.           Current Medications     alendronate (FOSAMAX) 70 MG tablet Take 1 tablet once weekly in the morning with a full glass of water on an empty stomach. Do not consume food or lie down for at least 30 minutes afterwards.    amlodipine (NORVASC) 5 MG tablet Take 1 tablet (5 mg total) by mouth once daily.    aspirin (ECOTRIN) 81 MG EC tablet Take 81 mg by mouth once daily.    atorvastatin (LIPITOR) 40 MG tablet Take 1 tablet (40 mg total) by mouth once daily.    CA CITRATE/MGOX/VIT D3/B6/MIN (CITRACAL PLUS ORAL) Take 1,200 mg by mouth once daily.    escitalopram oxalate (LEXAPRO) 20 MG tablet TAKE ONE TABLET BY MOUTH EVERY DAY.    fluorometholone 0.1% (FML) 0.1 % DrpS Place 1 drop into both eyes 4 (four) times daily.    hydrochlorothiazide (MICROZIDE) 12.5 mg capsule Take 1 capsule (12.5 mg total) by mouth once daily.    hydrOXYzine HCl (ATARAX) 25 MG tablet Take 1 tablet (25 mg total) by mouth 3 (three) times daily as needed for Itching.    imipramine (TOFRANIL) 25 MG tablet TAKE 1 TABLET BY MOUTH THREE TIMES A DAY    levothyroxine (SYNTHROID, LEVOTHROID) 175 MCG tablet Take 1 tablet (175 mcg total) by mouth once daily.    losartan (COZAAR) 100 MG tablet Take 1 tablet (100 mg total) by mouth once daily.    multivitamin capsule Take 1 capsule by mouth once daily.    temazepam (RESTORIL) 30 mg capsule Take 1 capsule (30 mg total) by mouth nightly.    tolterodine (DETROL LA) 4 MG 24 hr capsule TAKE ONE CAPSULE BY MOUTH EVERY DAY.           Clinical Reference Information           Allergies as of 5/10/2017     Lisinopril    Sulfur    Sulfa (Sulfonamide Antibiotics)      Immunizations Administered on Date of Encounter - 5/10/2017     None       Language Assistance Services     ATTENTION: Language assistance services are available, free of charge. Please call [phone].      ATENCIÓN: Si habla miguel, tiene a quiroz disposición servicios gratuitos de asistencia lingüística. Llame al [phone].     CHÚ Ý: N?u b?n nói Ti?ng Vi?t, có các d?ch v? h? tr? ngôn ng? mi?n phí dành cho b?n. G?i s? [phone].         [facility] complies with applicable Federal civil rights laws and does not discriminate on the basis of race, color, national origin, age, disability, or sex.

## 2017-05-19 DIAGNOSIS — E78.5 HYPERLIPIDEMIA, UNSPECIFIED HYPERLIPIDEMIA TYPE: Chronic | ICD-10-CM

## 2017-05-19 RX ORDER — ATORVASTATIN CALCIUM 40 MG/1
TABLET, FILM COATED ORAL
Qty: 30 TABLET | Refills: 5 | Status: SHIPPED | OUTPATIENT
Start: 2017-05-19 | End: 2017-11-25 | Stop reason: SDUPTHER

## 2017-05-19 RX ORDER — LOSARTAN POTASSIUM 100 MG/1
TABLET ORAL
Qty: 30 TABLET | Refills: 5 | Status: SHIPPED | OUTPATIENT
Start: 2017-05-19 | End: 2017-11-25 | Stop reason: SDUPTHER

## 2017-05-25 ENCOUNTER — OFFICE VISIT (OUTPATIENT)
Dept: OPTOMETRY | Facility: CLINIC | Age: 82
End: 2017-05-25
Payer: COMMERCIAL

## 2017-05-25 DIAGNOSIS — H04.123 DRY EYES, BILATERAL: Primary | ICD-10-CM

## 2017-05-25 PROCEDURE — 99999 PR PBB SHADOW E&M-EST. PATIENT-LVL II: CPT | Mod: PBBFAC,,, | Performed by: OPTOMETRIST

## 2017-05-25 PROCEDURE — 92012 INTRM OPH EXAM EST PATIENT: CPT | Mod: S$GLB,,, | Performed by: OPTOMETRIST

## 2017-05-25 NOTE — PROGRESS NOTES
"HPI     DLS: 5/10/2017  Pt states eyes feel much better. Denies redness and FBS    FML ou qid     Last edited by Jennie Prieto on 5/25/2017  1:21 PM. (History)        ROS     Positive for: Endocrine, Eyes    Negative for: Constitutional, Gastrointestinal, Neurological, Skin,   Genitourinary, Musculoskeletal, HENT, Cardiovascular, Respiratory,   Psychiatric, Allergic/Imm, Heme/Lymph    Last edited by Tray Stratton, OD on 5/25/2017  1:40 PM. (History)        Assessment /Plan     For exam results, see Encounter Report.    Dry eyes, bilateral        1. Cloudy capsule OU sp pciol  2. JOHN--sx not relieved on ATs, but pt doing well today after short course of FML    PLAN:    1. Taper FML: BID OU X 1 week, then qAM X 1 week, then DC.  "fill in" with SYSTANE BAL ATs to QID  2. rtc as julian Parikh for YAG OU                 "

## 2017-06-19 ENCOUNTER — OFFICE VISIT (OUTPATIENT)
Dept: OPHTHALMOLOGY | Facility: CLINIC | Age: 82
End: 2017-06-19
Payer: COMMERCIAL

## 2017-06-19 VITALS — DIASTOLIC BLOOD PRESSURE: 81 MMHG | HEART RATE: 94 BPM | SYSTOLIC BLOOD PRESSURE: 126 MMHG

## 2017-06-19 DIAGNOSIS — H26.493 PCO (POSTERIOR CAPSULAR OPACIFICATION), BILATERAL: Primary | ICD-10-CM

## 2017-06-19 DIAGNOSIS — H43.813 VITREOUS DETACHMENT, BILATERAL: ICD-10-CM

## 2017-06-19 DIAGNOSIS — Z96.1 PSEUDOPHAKIA: ICD-10-CM

## 2017-06-19 PROCEDURE — 66821 AFTER CATARACT LASER SURGERY: CPT | Mod: RT,S$GLB,, | Performed by: OPHTHALMOLOGY

## 2017-06-19 PROCEDURE — 99999 PR PBB SHADOW E&M-EST. PATIENT-LVL III: CPT | Mod: PBBFAC,,, | Performed by: OPHTHALMOLOGY

## 2017-06-19 PROCEDURE — 92014 COMPRE OPH EXAM EST PT 1/>: CPT | Mod: 57,S$GLB,, | Performed by: OPHTHALMOLOGY

## 2017-06-19 NOTE — PROGRESS NOTES
Subjective:       Patient ID: Harriet Rutherford is a 81 y.o. female.    Chief Complaint: Laser Treatment    HPI  Review of Systems    Objective:      Physical Exam    Assessment:       1. PCO (posterior capsular opacification), bilateral    2. Vitreous detachment, bilateral    3. Pseudophakia        Plan:     Visually significant  PCO OD>OS- Pt. Wants Laser.     PVD's OU-Stable.        YAG CAP OD  today. TE=   51.8 mj, Avg. 0.7mj, 74 pulses.  PF taper OD.  RTC 2 wks.

## 2017-06-20 ENCOUNTER — TELEPHONE (OUTPATIENT)
Dept: OPTOMETRY | Facility: CLINIC | Age: 82
End: 2017-06-20

## 2017-06-21 ENCOUNTER — TELEPHONE (OUTPATIENT)
Dept: OPHTHALMOLOGY | Facility: CLINIC | Age: 82
End: 2017-06-21

## 2017-06-22 ENCOUNTER — OFFICE VISIT (OUTPATIENT)
Dept: OPTOMETRY | Facility: CLINIC | Age: 82
End: 2017-06-22
Payer: COMMERCIAL

## 2017-06-22 ENCOUNTER — TELEPHONE (OUTPATIENT)
Dept: OPHTHALMOLOGY | Facility: CLINIC | Age: 82
End: 2017-06-22

## 2017-06-22 DIAGNOSIS — H53.2 MONOCULAR DIPLOPIA OF RIGHT EYE: ICD-10-CM

## 2017-06-22 DIAGNOSIS — H57.11 EYE PAIN, RIGHT: Primary | ICD-10-CM

## 2017-06-22 PROCEDURE — 99999 PR PBB SHADOW E&M-EST. PATIENT-LVL II: CPT | Mod: PBBFAC,,, | Performed by: OPTOMETRIST

## 2017-06-22 PROCEDURE — 92012 INTRM OPH EXAM EST PATIENT: CPT | Mod: S$GLB,,, | Performed by: OPTOMETRIST

## 2017-06-22 NOTE — PROGRESS NOTES
HPI     Monocular diplopia for 2 days sp YAG OD.  Better today    Last edited by Tray Stratton, OD on 6/22/2017  1:15 PM. (History)            Assessment /Plan     For exam results, see Encounter Report.    Eye pain, right    Monocular diplopia of right eye      Pt had YAG OD 3 days ago.  Reports pain afterwards, which resolved.  ALSO noted monoc diplopia OD X 2 days which has also resolved today.  [facility] wnl today    PLAN:    1. Pt reassurance given that eye is healing normally  2. Cont med taper as per Dr BELLAMY  3. rtc as sched w Dr Parikh, or immediately if sx recur

## 2017-06-22 NOTE — TELEPHONE ENCOUNTER
----- Message from Shey Ross sent at 6/21/2017  4:24 PM CDT -----  Contact: Harriet  Eduardo calling back to inform that she mis-spoke with regard to not having diplopia issues.  She states while she was out and about it was not there in OD but when she got home and looked at television it was there.  She can be reached at [phone]

## 2017-07-07 ENCOUNTER — TELEPHONE (OUTPATIENT)
Dept: INTERNAL MEDICINE | Facility: CLINIC | Age: 82
End: 2017-07-07

## 2017-07-07 NOTE — TELEPHONE ENCOUNTER
----- Message from Isatu Puentes sent at 7/7/2017 10:33 AM CDT -----  Contact: self/ [phone]  Patient is in [place] and have a UTI and would like something called into  [facility] at  [phone]. Please call patient to confirm when this has sena called in.  Patient symptoms are foul smelling urine, some cramping and urgency.

## 2017-07-10 ENCOUNTER — OFFICE VISIT (OUTPATIENT)
Dept: OPHTHALMOLOGY | Facility: CLINIC | Age: 82
End: 2017-07-10
Payer: COMMERCIAL

## 2017-07-10 ENCOUNTER — PATIENT MESSAGE (OUTPATIENT)
Dept: INTERNAL MEDICINE | Facility: CLINIC | Age: 82
End: 2017-07-10

## 2017-07-10 VITALS — SYSTOLIC BLOOD PRESSURE: 140 MMHG | HEART RATE: 90 BPM | DIASTOLIC BLOOD PRESSURE: 84 MMHG

## 2017-07-10 DIAGNOSIS — H26.492 PCO (POSTERIOR CAPSULAR OPACIFICATION), LEFT: ICD-10-CM

## 2017-07-10 DIAGNOSIS — Z96.1 PSEUDOPHAKIA: ICD-10-CM

## 2017-07-10 DIAGNOSIS — Z98.890 POST-OPERATIVE STATE: Primary | ICD-10-CM

## 2017-07-10 DIAGNOSIS — N39.0 URINARY TRACT INFECTION WITHOUT HEMATURIA, SITE UNSPECIFIED: Primary | ICD-10-CM

## 2017-07-10 PROCEDURE — 66821 AFTER CATARACT LASER SURGERY: CPT | Mod: 78,LT,S$GLB, | Performed by: OPHTHALMOLOGY

## 2017-07-10 PROCEDURE — 99999 PR PBB SHADOW E&M-EST. PATIENT-LVL III: CPT | Mod: PBBFAC,,, | Performed by: OPHTHALMOLOGY

## 2017-07-10 PROCEDURE — 99024 POSTOP FOLLOW-UP VISIT: CPT | Mod: S$GLB,,, | Performed by: OPHTHALMOLOGY

## 2017-07-10 NOTE — PROGRESS NOTES
Subjective:       Patient ID: Harriet Rutherford is a 81 y.o. female.    Chief Complaint: Posterior capsular opacification (PCO OS)    HPI  Review of Systems    Objective:      Physical Exam    Assessment:       1. Post-operative state    2. PCO (posterior capsular opacification), left    3. Pseudophakia        Plan:       S/p YAG CAP OD-Doing well.  YAG CAP OS today. TE=   39.2 mj, Avg. 0.7mj, 56 pulses.     PF taper OS.  RTC 2 wks.

## 2017-07-19 ENCOUNTER — LAB VISIT (OUTPATIENT)
Dept: LAB | Facility: HOSPITAL | Age: 82
End: 2017-07-19
Attending: INTERNAL MEDICINE
Payer: COMMERCIAL

## 2017-07-19 DIAGNOSIS — R82.90 ABNORMAL URINE: ICD-10-CM

## 2017-07-19 DIAGNOSIS — N39.0 URINARY TRACT INFECTION WITHOUT HEMATURIA, SITE UNSPECIFIED: ICD-10-CM

## 2017-07-19 LAB
BACTERIA #/AREA URNS AUTO: NORMAL /HPF
BILIRUB UR QL STRIP: NEGATIVE
CLARITY UR REFRACT.AUTO: CLEAR
COLOR UR AUTO: YELLOW
GLUCOSE UR QL STRIP: NEGATIVE
HGB UR QL STRIP: NEGATIVE
KETONES UR QL STRIP: NEGATIVE
LEUKOCYTE ESTERASE UR QL STRIP: ABNORMAL
MICROSCOPIC COMMENT: NORMAL
NITRITE UR QL STRIP: NEGATIVE
PH UR STRIP: 7 [PH] (ref 5–8)
PROT UR QL STRIP: NEGATIVE
RBC #/AREA URNS AUTO: 0 /HPF (ref 0–4)
SP GR UR STRIP: 1.01 (ref 1–1.03)
SQUAMOUS #/AREA URNS AUTO: 1 /HPF
URN SPEC COLLECT METH UR: ABNORMAL
UROBILINOGEN UR STRIP-ACNC: NEGATIVE EU/DL
WBC #/AREA URNS AUTO: 2 /HPF (ref 0–5)

## 2017-07-19 PROCEDURE — 87186 SC STD MICRODIL/AGAR DIL: CPT

## 2017-07-19 PROCEDURE — 87088 URINE BACTERIA CULTURE: CPT

## 2017-07-19 PROCEDURE — 87077 CULTURE AEROBIC IDENTIFY: CPT

## 2017-07-19 PROCEDURE — 87086 URINE CULTURE/COLONY COUNT: CPT

## 2017-07-19 PROCEDURE — 81001 URINALYSIS AUTO W/SCOPE: CPT

## 2017-07-22 ENCOUNTER — PATIENT MESSAGE (OUTPATIENT)
Dept: INTERNAL MEDICINE | Facility: CLINIC | Age: 82
End: 2017-07-22

## 2017-07-22 LAB — BACTERIA UR CULT: NORMAL

## 2017-07-22 RX ORDER — AMOXICILLIN AND CLAVULANATE POTASSIUM 875; 125 MG/1; MG/1
1 TABLET, FILM COATED ORAL 2 TIMES DAILY
Qty: 14 TABLET | Refills: 0 | Status: SHIPPED | OUTPATIENT
Start: 2017-07-22 | End: 2017-07-29

## 2017-11-25 DIAGNOSIS — E78.5 HYPERLIPIDEMIA, UNSPECIFIED HYPERLIPIDEMIA TYPE: Chronic | ICD-10-CM

## 2017-11-25 DIAGNOSIS — G47.00 INSOMNIA, UNSPECIFIED TYPE: ICD-10-CM

## 2017-11-26 RX ORDER — TEMAZEPAM 30 MG/1
CAPSULE ORAL
Qty: 30 CAPSULE | Refills: 0 | Status: SHIPPED | OUTPATIENT
Start: 2017-11-26 | End: 2017-12-05 | Stop reason: SDUPTHER

## 2017-11-26 RX ORDER — ATORVASTATIN CALCIUM 40 MG/1
TABLET, FILM COATED ORAL
Qty: 30 TABLET | Refills: 4 | Status: SHIPPED | OUTPATIENT
Start: 2017-11-26 | End: 2018-05-09 | Stop reason: SDUPTHER

## 2017-11-26 RX ORDER — LOSARTAN POTASSIUM 100 MG/1
TABLET ORAL
Qty: 30 TABLET | Refills: 4 | Status: SHIPPED | OUTPATIENT
Start: 2017-11-26 | End: 2018-05-09 | Stop reason: SDUPTHER

## 2017-12-05 ENCOUNTER — OFFICE VISIT (OUTPATIENT)
Dept: INTERNAL MEDICINE | Facility: CLINIC | Age: 82
End: 2017-12-05
Payer: COMMERCIAL

## 2017-12-05 ENCOUNTER — HOSPITAL ENCOUNTER (OUTPATIENT)
Dept: RADIOLOGY | Facility: HOSPITAL | Age: 82
Discharge: HOME | End: 2017-12-05
Attending: INTERNAL MEDICINE
Payer: COMMERCIAL

## 2017-12-05 VITALS
WEIGHT: 208.13 LBS | TEMPERATURE: 98 F | HEIGHT: 67 IN | DIASTOLIC BLOOD PRESSURE: 74 MMHG | BODY MASS INDEX: 32.67 KG/M2 | HEART RATE: 96 BPM | SYSTOLIC BLOOD PRESSURE: 128 MMHG

## 2017-12-05 DIAGNOSIS — G47.00 INSOMNIA, UNSPECIFIED TYPE: ICD-10-CM

## 2017-12-05 DIAGNOSIS — R60.9 EDEMA, UNSPECIFIED TYPE: Primary | ICD-10-CM

## 2017-12-05 DIAGNOSIS — R60.9 EDEMA, UNSPECIFIED TYPE: ICD-10-CM

## 2017-12-05 PROCEDURE — 71020 XR CHEST PA AND LATERAL: CPT | Mod: 26,,, | Performed by: RADIOLOGY

## 2017-12-05 PROCEDURE — 99999 PR PBB SHADOW E&M-EST. PATIENT-LVL III: CPT | Mod: PBBFAC,,, | Performed by: INTERNAL MEDICINE

## 2017-12-05 PROCEDURE — 71020 XR CHEST PA AND LATERAL: CPT | Mod: TC,PO

## 2017-12-05 PROCEDURE — 99214 OFFICE O/P EST MOD 30 MIN: CPT | Mod: S$GLB,,, | Performed by: INTERNAL MEDICINE

## 2017-12-05 RX ORDER — HYDROCHLOROTHIAZIDE 25 MG/1
25 TABLET ORAL DAILY
Qty: 30 TABLET | Refills: 11 | Status: SHIPPED | OUTPATIENT
Start: 2017-12-05 | End: 2018-12-13 | Stop reason: SDUPTHER

## 2017-12-05 RX ORDER — HYDROXYZINE HYDROCHLORIDE 25 MG/1
25 TABLET, FILM COATED ORAL 3 TIMES DAILY PRN
Qty: 60 TABLET | Refills: 3 | Status: SHIPPED | OUTPATIENT
Start: 2017-12-05 | End: 2023-11-20 | Stop reason: SDUPTHER

## 2017-12-05 RX ORDER — TEMAZEPAM 30 MG/1
30 CAPSULE ORAL NIGHTLY
Qty: 30 CAPSULE | Refills: 3 | Status: SHIPPED | OUTPATIENT
Start: 2017-12-05 | End: 2018-03-25 | Stop reason: SDUPTHER

## 2017-12-05 NOTE — PROGRESS NOTES
Subjective:       Patient ID: Harriet Rutherford is a 82 y.o. female.    Chief Complaint: Leg Swelling    Patient is a 82 y.o.female who presents today for bilateral leg swelling. Ongoing for a few months now. She does take the hctz daily. She does note more knee swelling and is hoping to have surgery soon. No chest pain or shortness of breath. No orthopnea. She has not been eating a lot of salt.    Insomnia: stable with restoril    Review of Systems   Constitutional: Negative for appetite change, chills, diaphoresis, fatigue and fever.   HENT: Negative for congestion, dental problem, ear discharge, ear pain, hearing loss, postnasal drip, sinus pressure and sore throat.    Eyes: Negative for discharge, redness and itching.   Respiratory: Negative for cough, chest tightness, shortness of breath and wheezing.    Cardiovascular: Positive for leg swelling. Negative for chest pain and palpitations.   Gastrointestinal: Negative for abdominal pain, constipation, diarrhea, nausea and vomiting.   Endocrine: Negative for cold intolerance and heat intolerance.   Genitourinary: Negative for difficulty urinating, frequency, hematuria and urgency.   Musculoskeletal: Negative for arthralgias, back pain, gait problem, myalgias and neck pain.   Skin: Negative for color change and rash.   Neurological: Negative for dizziness, syncope and headaches.   Hematological: Negative for adenopathy.   Psychiatric/Behavioral: Negative for behavioral problems and sleep disturbance. The patient is not nervous/anxious.        Objective:      Physical Exam   Constitutional: She is oriented to person, place, and time. She appears well-developed and well-nourished. No distress.   HENT:   Head: Normocephalic and atraumatic.   Right Ear: External ear normal.   Left Ear: External ear normal.   Nose: Nose normal.   Mouth/Throat: Oropharynx is clear and moist. No oropharyngeal exudate.   Eyes: Conjunctivae and EOM are normal. Pupils are equal, round, and reactive  to light. Right eye exhibits no discharge. Left eye exhibits no discharge. No scleral icterus.   Neck: Normal range of motion. Neck supple. No JVD present. No thyromegaly present.   Cardiovascular: Normal rate, regular rhythm, normal heart sounds and intact distal pulses.  Exam reveals no gallop and no friction rub.    No murmur heard.  Pulmonary/Chest: Effort normal and breath sounds normal. No stridor. No respiratory distress. She has no wheezes. She has no rales. She exhibits no tenderness.   Abdominal: Soft. Bowel sounds are normal. She exhibits no distension. There is no tenderness. There is no rebound.   Musculoskeletal: Normal range of motion. She exhibits no edema or tenderness.   Lymphadenopathy:     She has no cervical adenopathy.   Neurological: She is alert and oriented to person, place, and time. No cranial nerve deficit.   Skin: Skin is warm and dry. No rash noted. She is not diaphoretic. No erythema.   1+ pitting edema bilateral legs   Psychiatric: She has a normal mood and affect. Her behavior is normal.   Nursing note and vitals reviewed.      Assessment and Plan:       1. Insomnia, unspecified type  - temazepam (RESTORIL) 30 mg capsule; Take 1 capsule (30 mg total) by mouth nightly.  Dispense: 30 capsule; Refill: 3    2. Edema, unspecified type  - low salt  - increase compression stocking usage  - increase hctz to 25 mg daily  - notify clinic of symptoms in one week  - X-Ray Chest PA And Lateral; Future  - Brain natriuretic peptide; Future  - Comprehensive metabolic panel; Future          No Follow-up on file.

## 2017-12-27 RX ORDER — IMIPRAMINE HYDROCHLORIDE 25 MG/1
TABLET, FILM COATED ORAL
Qty: 90 TABLET | Refills: 7 | Status: SHIPPED | OUTPATIENT
Start: 2017-12-27 | End: 2018-05-11 | Stop reason: SDUPTHER

## 2018-03-25 DIAGNOSIS — G47.00 INSOMNIA, UNSPECIFIED TYPE: ICD-10-CM

## 2018-03-25 RX ORDER — TEMAZEPAM 30 MG/1
CAPSULE ORAL
Qty: 30 CAPSULE | Refills: 2 | Status: SHIPPED | OUTPATIENT
Start: 2018-03-25 | End: 2018-07-19 | Stop reason: SDUPTHER

## 2018-03-25 RX ORDER — LEVOTHYROXINE SODIUM 175 UG/1
175 TABLET ORAL DAILY
Qty: 30 TABLET | Refills: 10 | Status: SHIPPED | OUTPATIENT
Start: 2018-03-25 | End: 2018-05-09 | Stop reason: SDUPTHER

## 2018-04-04 ENCOUNTER — TELEPHONE (OUTPATIENT)
Dept: INTERNAL MEDICINE | Facility: CLINIC | Age: 83
End: 2018-04-04

## 2018-04-04 DIAGNOSIS — E78.5 HYPERLIPIDEMIA, UNSPECIFIED HYPERLIPIDEMIA TYPE: Chronic | ICD-10-CM

## 2018-04-04 DIAGNOSIS — I15.2 HYPERTENSION DUE TO ENDOCRINE DISORDER: Chronic | ICD-10-CM

## 2018-04-04 DIAGNOSIS — E03.9 HYPOTHYROIDISM, UNSPECIFIED TYPE: Chronic | ICD-10-CM

## 2018-04-04 DIAGNOSIS — K76.0 NAFLD (NONALCOHOLIC FATTY LIVER DISEASE): Primary | ICD-10-CM

## 2018-04-04 DIAGNOSIS — M81.0 OSTEOPOROSIS, UNSPECIFIED OSTEOPOROSIS TYPE, UNSPECIFIED PATHOLOGICAL FRACTURE PRESENCE: ICD-10-CM

## 2018-04-04 NOTE — TELEPHONE ENCOUNTER
----- Message from Aroldo Vázquez sent at 4/4/2018  3:41 PM CDT -----  Contact: self [phone]  Doctor appointment and lab have been scheduled.  Please link lab orders to the lab appointment.  Date of doctor appointment:  05/09  Physical or EP:  Physical  Date of lab appointment:  04/27  Comments: Dr. Guevara

## 2018-04-24 RX ORDER — TOLTERODINE 4 MG/1
CAPSULE, EXTENDED RELEASE ORAL
Qty: 30 CAPSULE | Refills: 0 | Status: SHIPPED | OUTPATIENT
Start: 2018-04-24 | End: 2018-05-22 | Stop reason: SDUPTHER

## 2018-04-24 RX ORDER — ESCITALOPRAM OXALATE 20 MG/1
TABLET ORAL
Qty: 30 TABLET | Refills: 0 | Status: SHIPPED | OUTPATIENT
Start: 2018-04-24 | End: 2018-05-09

## 2018-04-24 RX ORDER — AMLODIPINE BESYLATE 10 MG/1
TABLET ORAL
Qty: 30 TABLET | Refills: 0 | Status: SHIPPED | OUTPATIENT
Start: 2018-04-24 | End: 2018-05-09 | Stop reason: SDUPTHER

## 2018-04-27 ENCOUNTER — LAB VISIT (OUTPATIENT)
Dept: LAB | Facility: HOSPITAL | Age: 83
End: 2018-04-27
Attending: INTERNAL MEDICINE
Payer: COMMERCIAL

## 2018-04-27 DIAGNOSIS — I15.2 HYPERTENSION DUE TO ENDOCRINE DISORDER: Chronic | ICD-10-CM

## 2018-04-27 DIAGNOSIS — M81.0 OSTEOPOROSIS, UNSPECIFIED OSTEOPOROSIS TYPE, UNSPECIFIED PATHOLOGICAL FRACTURE PRESENCE: ICD-10-CM

## 2018-04-27 DIAGNOSIS — K76.0 NAFLD (NONALCOHOLIC FATTY LIVER DISEASE): ICD-10-CM

## 2018-04-27 DIAGNOSIS — E03.9 HYPOTHYROIDISM, UNSPECIFIED TYPE: Chronic | ICD-10-CM

## 2018-04-27 DIAGNOSIS — E78.5 HYPERLIPIDEMIA, UNSPECIFIED HYPERLIPIDEMIA TYPE: Chronic | ICD-10-CM

## 2018-04-27 LAB
25(OH)D3+25(OH)D2 SERPL-MCNC: 35 NG/ML
ALBUMIN SERPL BCP-MCNC: 4.1 G/DL
ALP SERPL-CCNC: 138 U/L
ALT SERPL W/O P-5'-P-CCNC: 26 U/L
ANION GAP SERPL CALC-SCNC: 13 MMOL/L
AST SERPL-CCNC: 21 U/L
BASOPHILS # BLD AUTO: 0.09 K/UL
BASOPHILS NFR BLD: 1 %
BILIRUB SERPL-MCNC: 0.4 MG/DL
BUN SERPL-MCNC: 27 MG/DL
CALCIUM SERPL-MCNC: 10.5 MG/DL
CHLORIDE SERPL-SCNC: 104 MMOL/L
CHOLEST SERPL-MCNC: 195 MG/DL
CHOLEST/HDLC SERPL: 3.5 {RATIO}
CO2 SERPL-SCNC: 25 MMOL/L
CREAT SERPL-MCNC: 1.1 MG/DL
DIFFERENTIAL METHOD: ABNORMAL
EOSINOPHIL # BLD AUTO: 0.3 K/UL
EOSINOPHIL NFR BLD: 2.8 %
ERYTHROCYTE [DISTWIDTH] IN BLOOD BY AUTOMATED COUNT: 13 %
EST. GFR  (AFRICAN AMERICAN): 54.8 ML/MIN/1.73 M^2
EST. GFR  (NON AFRICAN AMERICAN): 47.5 ML/MIN/1.73 M^2
GLUCOSE SERPL-MCNC: 119 MG/DL
HCT VFR BLD AUTO: 45.1 %
HDLC SERPL-MCNC: 56 MG/DL
HDLC SERPL: 28.7 %
HGB BLD-MCNC: 14.1 G/DL
IMM GRANULOCYTES # BLD AUTO: 0.05 K/UL
IMM GRANULOCYTES NFR BLD AUTO: 0.5 %
LDLC SERPL CALC-MCNC: 96.4 MG/DL
LYMPHOCYTES # BLD AUTO: 2.2 K/UL
LYMPHOCYTES NFR BLD: 23.4 %
MCH RBC QN AUTO: 30.1 PG
MCHC RBC AUTO-ENTMCNC: 31.3 G/DL
MCV RBC AUTO: 96 FL
MONOCYTES # BLD AUTO: 1.1 K/UL
MONOCYTES NFR BLD: 11.8 %
NEUTROPHILS # BLD AUTO: 5.5 K/UL
NEUTROPHILS NFR BLD: 60.5 %
NONHDLC SERPL-MCNC: 139 MG/DL
NRBC BLD-RTO: 0 /100 WBC
PLATELET # BLD AUTO: 370 K/UL
PMV BLD AUTO: 9.4 FL
POTASSIUM SERPL-SCNC: 4 MMOL/L
PROT SERPL-MCNC: 7.4 G/DL
RBC # BLD AUTO: 4.69 M/UL
SODIUM SERPL-SCNC: 142 MMOL/L
T4 FREE SERPL-MCNC: 1.31 NG/DL
TRIGL SERPL-MCNC: 213 MG/DL
TSH SERPL DL<=0.005 MIU/L-ACNC: 7.08 UIU/ML
WBC # BLD AUTO: 9.17 K/UL

## 2018-04-27 PROCEDURE — 80053 COMPREHEN METABOLIC PANEL: CPT

## 2018-04-27 PROCEDURE — 36415 COLL VENOUS BLD VENIPUNCTURE: CPT

## 2018-04-27 PROCEDURE — 85025 COMPLETE CBC W/AUTO DIFF WBC: CPT

## 2018-04-27 PROCEDURE — 84439 ASSAY OF FREE THYROXINE: CPT

## 2018-04-27 PROCEDURE — 80061 LIPID PANEL: CPT

## 2018-04-27 PROCEDURE — 82306 VITAMIN D 25 HYDROXY: CPT

## 2018-04-27 PROCEDURE — 84443 ASSAY THYROID STIM HORMONE: CPT

## 2018-05-09 ENCOUNTER — OFFICE VISIT (OUTPATIENT)
Dept: INTERNAL MEDICINE | Facility: CLINIC | Age: 83
End: 2018-05-09
Payer: COMMERCIAL

## 2018-05-09 VITALS
WEIGHT: 216.06 LBS | HEART RATE: 107 BPM | SYSTOLIC BLOOD PRESSURE: 138 MMHG | DIASTOLIC BLOOD PRESSURE: 84 MMHG | BODY MASS INDEX: 33.91 KG/M2 | OXYGEN SATURATION: 99 % | TEMPERATURE: 98 F | RESPIRATION RATE: 16 BRPM | HEIGHT: 67 IN

## 2018-05-09 DIAGNOSIS — N17.9 AKI (ACUTE KIDNEY INJURY): ICD-10-CM

## 2018-05-09 DIAGNOSIS — R73.9 ELEVATED BLOOD SUGAR: ICD-10-CM

## 2018-05-09 DIAGNOSIS — I15.2 HYPERTENSION DUE TO ENDOCRINE DISORDER: Chronic | ICD-10-CM

## 2018-05-09 DIAGNOSIS — Z00.00 ANNUAL PHYSICAL EXAM: Primary | ICD-10-CM

## 2018-05-09 DIAGNOSIS — Z78.0 POSTMENOPAUSAL ESTROGEN DEFICIENCY: ICD-10-CM

## 2018-05-09 DIAGNOSIS — E78.5 HYPERLIPIDEMIA, UNSPECIFIED HYPERLIPIDEMIA TYPE: Chronic | ICD-10-CM

## 2018-05-09 DIAGNOSIS — E03.9 HYPOTHYROIDISM, UNSPECIFIED TYPE: ICD-10-CM

## 2018-05-09 DIAGNOSIS — R60.0 LEG EDEMA: ICD-10-CM

## 2018-05-09 DIAGNOSIS — Z12.31 VISIT FOR SCREENING MAMMOGRAM: ICD-10-CM

## 2018-05-09 DIAGNOSIS — Z23 NEED FOR PNEUMOCOCCAL VACCINATION: ICD-10-CM

## 2018-05-09 DIAGNOSIS — F32.A DEPRESSION, UNSPECIFIED DEPRESSION TYPE: ICD-10-CM

## 2018-05-09 PROCEDURE — 3075F SYST BP GE 130 - 139MM HG: CPT | Mod: CPTII,S$GLB,, | Performed by: INTERNAL MEDICINE

## 2018-05-09 PROCEDURE — 90732 PPSV23 VACC 2 YRS+ SUBQ/IM: CPT | Mod: S$GLB,,, | Performed by: INTERNAL MEDICINE

## 2018-05-09 PROCEDURE — 90471 IMMUNIZATION ADMIN: CPT | Mod: S$GLB,,, | Performed by: INTERNAL MEDICINE

## 2018-05-09 PROCEDURE — 3079F DIAST BP 80-89 MM HG: CPT | Mod: CPTII,S$GLB,, | Performed by: INTERNAL MEDICINE

## 2018-05-09 PROCEDURE — 99397 PER PM REEVAL EST PAT 65+ YR: CPT | Mod: 25,S$GLB,, | Performed by: INTERNAL MEDICINE

## 2018-05-09 PROCEDURE — 99999 PR PBB SHADOW E&M-EST. PATIENT-LVL IV: CPT | Mod: PBBFAC,,, | Performed by: INTERNAL MEDICINE

## 2018-05-09 RX ORDER — CITALOPRAM 20 MG/1
20 TABLET, FILM COATED ORAL DAILY
Qty: 30 TABLET | Refills: 2 | Status: SHIPPED | OUTPATIENT
Start: 2018-05-09 | End: 2018-07-16

## 2018-05-09 RX ORDER — AMLODIPINE BESYLATE 10 MG/1
10 TABLET ORAL DAILY
Qty: 90 TABLET | Refills: 3 | Status: SHIPPED | OUTPATIENT
Start: 2018-05-09 | End: 2018-10-17

## 2018-05-09 RX ORDER — ATORVASTATIN CALCIUM 40 MG/1
40 TABLET, FILM COATED ORAL DAILY
Qty: 90 TABLET | Refills: 3 | Status: SHIPPED | OUTPATIENT
Start: 2018-05-09 | End: 2019-09-26 | Stop reason: SDUPTHER

## 2018-05-09 RX ORDER — LOSARTAN POTASSIUM 100 MG/1
100 TABLET ORAL DAILY
Qty: 90 TABLET | Refills: 3 | Status: SHIPPED | OUTPATIENT
Start: 2018-05-09 | End: 2018-08-22 | Stop reason: SDUPTHER

## 2018-05-09 RX ORDER — LEVOTHYROXINE SODIUM 200 UG/1
200 TABLET ORAL DAILY
Qty: 90 TABLET | Refills: 1 | Status: SHIPPED | OUTPATIENT
Start: 2018-05-09 | End: 2018-06-26 | Stop reason: SDUPTHER

## 2018-05-09 NOTE — PROGRESS NOTES
Subjective:       Patient ID: Harriet Rutherford is a 82 y.o. female.    Chief Complaint: Annual Exam    HPI   82 y.o. female here for annual physical exam.  She is a patient of Dr. Guevara    Chronic medical issues:  Hypothyroidism: recent TSH elevated, currently on 175mcg synthroid    Depression: She has been on Lexapro 20 mg for many years, she feels like it's not working.  She has been noting more dysphoric mood or recently and lack of energy.  She notes she still goes out but doesn't have as much energy as she used to.  She has some difficulty concentrating as well.  She denies any suicidal or homicidal ideations.    HTN: She does not check at home, well-controlled on current medications, no chest pain or shortness of breath, no dizziness or lightheadedness    HLD: Stable on statin    Stress incontinence: Follows with Dr. Kumar    Health Maintenance    Vaccines: Influenza UTD (yearly);  Tetanus 2009 (every 10 yrs - 1st tdap);   Pneumovax 2009, due (>64yo);  Prevnar 2016  Zoster (>61yo) 2009  HIV: declines ages 15-65  Sexual Screening: negative  STD screening: declines  Eye exam: 6/2017, will make appt Annual  DDS exam: UTD q6 mos.  Breast Cancer: Mammogram due now   Cervical Cancer: Pap Smear s/p JOSSUE   Colorectal Cancer: Colonoscopy 2016, due 2021  Lipid disorders: UTD ages >/= 45 or >/= 20 if increased ASCVD risk   Osteoporosis: DEXA due >/= 65, every 2 years      Exercise: not currently exercising due to knee and back issues  Diet: was on WW, she does cook at home     Past Medical History:   Diagnosis Date    Cataract     Chronic back pain     Hepatic steatosis 10/19/2016    Hyperlipidemia LDL goal <130     Hypertension     Hypothyroidism     OP (osteoporosis)     Smoker     Stress incontinence     Urinary tract infection       Past Surgical History:   Procedure Laterality Date    BLADDER SURGERY      lifted     CATARACT EXTRACTION      Both eyes    COLONOSCOPY N/A 5/19/2016    Procedure: COLONOSCOPY;   Surgeon: Wander Mahajan MD;  Location: [facility] ([address] [place] [address]);  Service: Endoscopy;  Laterality: N/A;  Per Dr. Mahajan use [employer]     CYSTOSCOPY      FOOT SURGERY Right 11-4-15    OSTEOTOMY-METATARSAL    HYSTERECTOMY      OOPHORECTOMY       Social History     Social History    Marital status:      Spouse name: N/A    Number of children: N/A    Years of education: N/A     Occupational History    Not on file.     Social History Main Topics    Smoking status: Former Smoker     Packs/day: 1.00     Years: 60.00     Types: Cigarettes     Quit date: 7/19/2016    Smokeless tobacco: Never Used    Alcohol use 0.0 oz/week     1 - 2 Shots of liquor per week      Comment: RARELY    Drug use: No    Sexual activity: Not Currently     Other Topics Concern    Not on file     Social History Narrative    No narrative on file     Review of patient's allergies indicates:   Allergen Reactions    Lisinopril Other (See Comments)     cough    Sulfur      Other reaction(s): Urticaria    Sulfa (sulfonamide antibiotics) Rash     Ms. Rutherford had no medications administered during this visit.    Review of Systems   Constitutional: Positive for activity change and unexpected weight change (weight gain over last few years). Negative for chills and fever.   HENT: Negative for congestion, hearing loss, rhinorrhea, sinus pain, sinus pressure, sore throat and trouble swallowing.    Eyes: Negative for pain, discharge, redness and visual disturbance.   Respiratory: Negative for cough, chest tightness, shortness of breath and wheezing.    Cardiovascular: Negative for chest pain and palpitations.   Gastrointestinal: Negative for abdominal pain, blood in stool, constipation, diarrhea, nausea and vomiting.   Endocrine: Negative for polydipsia and polyuria.   Genitourinary: Negative for difficulty urinating, dysuria, hematuria and menstrual problem.   Musculoskeletal: Positive for arthralgias (knees, back) and back pain (chronic).  Negative for joint swelling and neck pain.   Skin: Negative for rash.   Neurological: Negative for dizziness, light-headedness and headaches.   Psychiatric/Behavioral: Positive for decreased concentration and dysphoric mood. Negative for confusion and sleep disturbance.     Objective:     Vitals:    05/09/18 1258   BP: 138/84   Pulse: 107   Resp: 16   Temp: 97.9 °F (36.6 °C)    Body mass index is 33.84 kg/m².  Physical Exam   Constitutional: She is oriented to person, place, and time. She appears well-developed and well-nourished.   HENT:   Head: Normocephalic and atraumatic.   Mouth/Throat: Oropharynx is clear and moist.   Eyes: Conjunctivae are normal. Pupils are equal, round, and reactive to light.   Neck: Normal range of motion. No tracheal deviation present. No thyromegaly present.   Cardiovascular: Normal rate, regular rhythm, normal heart sounds and intact distal pulses.  Exam reveals no gallop and no friction rub.    No murmur heard.  Pulmonary/Chest: Effort normal and breath sounds normal. She has no wheezes. She has no rales.   Abdominal: Soft. Bowel sounds are normal. There is no tenderness. There is no guarding.   Musculoskeletal: She exhibits edema (1+ pitting edema b/l).   Lymphadenopathy:     She has no cervical adenopathy.   Neurological: She is alert and oriented to person, place, and time.   Skin: Skin is warm and dry. No rash noted.   Psychiatric: Judgment normal. She exhibits a depressed mood.     Assessment:     1. Annual physical exam    2. LIZZY (acute kidney injury)    3. Elevated blood sugar    4. Visit for screening mammogram    5. Postmenopausal estrogen deficiency    6. Hypothyroidism, unspecified type    7. Hypertension due to endocrine disorder    8. Hyperlipidemia, unspecified hyperlipidemia type    9. Depression, unspecified depression type    10. Need for pneumococcal vaccination    11. Leg edema      Plan:   1. Annual physical exam  - labs reviewed with patient    2. LIZZY (acute kidney  injury)  - avoid NSAIDs  - Basic metabolic panel; Future    3. Elevated blood sugar  - Hemoglobin A1c; Future    4. Visit for screening mammogram  - Mammo Digital Screening Bilat with CAD; Future    5. Postmenopausal estrogen deficiency  - DXA Bone Density Spine And Hip; Future    6. Hypothyroidism, unspecified type  - levothyroxine (SYNTHROID) 200 MCG tablet; Take 1 tablet (200 mcg total) by mouth once daily.  Dispense: 90 tablet; Refill: 1  - TSH; Future  - T4, free; Future    7. Hypertension due to endocrine disorder  - stable  - amLODIPine (NORVASC) 10 MG tablet; Take 1 tablet (10 mg total) by mouth once daily.  Dispense: 90 tablet; Refill: 3  - losartan (COZAAR) 100 MG tablet; Take 1 tablet (100 mg total) by mouth once daily.  Dispense: 90 tablet; Refill: 3    8. Hyperlipidemia, unspecified hyperlipidemia type  - atorvastatin (LIPITOR) 40 MG tablet; Take 1 tablet (40 mg total) by mouth once daily.  Dispense: 90 tablet; Refill: 3    9. Depression, unspecified depression type  - will change to celexa and increase dose at next visit if tolerating  - citalopram (CELEXA) 20 MG tablet; Take 1 tablet (20 mg total) by mouth once daily.  Dispense: 30 tablet; Refill: 2    10. Need for pneumococcal vaccination  - Pneumococcal Polysaccharide Vaccine (23 Valent) (SQ/IM)    11. Leg edema  - elevate legs at all time  - echo 2017, no CHF  - COMPRESSION STOCKINGS      Return to clinic in two months for follow up or sooner if dictated by labs or illness.

## 2018-05-11 ENCOUNTER — OFFICE VISIT (OUTPATIENT)
Dept: UROLOGY | Facility: CLINIC | Age: 83
End: 2018-05-11
Payer: COMMERCIAL

## 2018-05-11 VITALS
WEIGHT: 216.06 LBS | HEART RATE: 103 BPM | HEIGHT: 67 IN | BODY MASS INDEX: 33.91 KG/M2 | DIASTOLIC BLOOD PRESSURE: 74 MMHG | SYSTOLIC BLOOD PRESSURE: 145 MMHG

## 2018-05-11 DIAGNOSIS — N32.81 URGENCY-FREQUENCY SYNDROME: Primary | ICD-10-CM

## 2018-05-11 DIAGNOSIS — N39.3 STRESS INCONTINENCE: ICD-10-CM

## 2018-05-11 PROCEDURE — 3077F SYST BP >= 140 MM HG: CPT | Mod: CPTII,S$GLB,, | Performed by: UROLOGY

## 2018-05-11 PROCEDURE — 99204 OFFICE O/P NEW MOD 45 MIN: CPT | Mod: S$GLB,,, | Performed by: UROLOGY

## 2018-05-11 PROCEDURE — 3078F DIAST BP <80 MM HG: CPT | Mod: CPTII,S$GLB,, | Performed by: UROLOGY

## 2018-05-11 PROCEDURE — 99999 PR PBB SHADOW E&M-EST. PATIENT-LVL IV: CPT | Mod: PBBFAC,,, | Performed by: UROLOGY

## 2018-05-11 RX ORDER — SOLIFENACIN SUCCINATE 5 MG/1
10 TABLET, FILM COATED ORAL DAILY
Qty: 30 TABLET | Refills: 11 | Status: SHIPPED | OUTPATIENT
Start: 2018-05-11 | End: 2018-08-10

## 2018-05-11 RX ORDER — IMIPRAMINE HYDROCHLORIDE 25 MG/1
25 TABLET, FILM COATED ORAL 3 TIMES DAILY
Qty: 90 TABLET | Refills: 11 | Status: SHIPPED | OUTPATIENT
Start: 2018-05-11 | End: 2019-05-10 | Stop reason: SDUPTHER

## 2018-05-11 RX ORDER — OXYBUTYNIN CHLORIDE 10 MG/1
10 TABLET, EXTENDED RELEASE ORAL DAILY
Qty: 30 TABLET | Refills: 11 | Status: SHIPPED | OUTPATIENT
Start: 2018-05-11 | End: 2019-05-10 | Stop reason: SDUPTHER

## 2018-05-11 NOTE — PROGRESS NOTES
CC: OAB, detraol [place] is too expensive    Harriet Rutherford is a 77 y.o. woman who is here for the evaluation of Urinary Incontinence  s/p Monarc Sling surgery done by me back in 1/2/08, c/o mixed incontinence. Thus she was started on impramine 25 mg TID and Detrol LA.  With these medications, her urine control is very good.  However, she reported that Detrol LA costs more than $230 a month.  She is interested in alternative medication that is much cheaper than Detrol LA            Past Medical History   Diagnosis Date    Hyperlipidemia LDL goal <130      Hypothyroidism      OP (osteoporosis)      Stress incontinence      Chronic back pain      Smoker             Past Surgical History   Procedure Date    Cataract extraction      Hyserectomy      Bladder surgery             History   Substance Use Topics    Smoking status: Former Smoker    Smokeless tobacco: Not on file    Alcohol Use: No            Family History   Problem Relation Age of Onset    Cataracts Mother      Cataracts Father      Hypertension Father      Stroke Father      Cataracts Sister      Cancer Brother      Cataracts Brother      Cataracts Maternal Grandmother      Cataracts Sister        Allergy:        Allergies   Allergen Reactions    Aspirin         Other reaction(s): Stomach upset  Upset stomache    Lisinopril Other (See Comments)       cough    Sulfur         Other reaction(s): Urticaria    Sulfa(Sulfonamide Antibiotics) Rash      Encounter Medications   Outpatient Encounter Prescriptions as of 1/28/2013   Medication Sig Dispense Refill    alendronate (FOSAMAX) 70 MG tablet Take 1 tablet by mouth every 7 days.         escitalopram (LEXAPRO) 10 MG tablet Take 1 tablet (10 mg total) by mouth once daily.  30 tablet  11    fluocinonide (LIDEX) 0.05 % cream          hydrOXYzine (ATARAX) 25 MG tablet Take 1 tablet by mouth 3 (three) times daily as needed.         imipramine (TOFRANIL) 25 MG tablet Take 1 tablet (25 mg total) by  mouth 3 (three) times daily.  90 tablet  11    levothyroxine (SYNTHROID) 200 MCG tablet Take 1 tablet by mouth before breakfast.         simvastatin (ZOCOR) 40 MG tablet Take 1 tablet by mouth every evening.         tolterodine (DETROL LA) 4 MG 24 hr capsule Take 1 capsule (4 mg total) by mouth once daily.  30 capsule  11    zolpidem (AMBIEN) 5 MG Tab Take 1 tablet (5 mg total) by mouth nightly as needed.  30 tablet  5    DISCONTD: tolterodine (DETROL LA) 4 MG 24 hr capsule Take 4 mg by mouth once daily.              Review of Systems   General ROS: GENERAL:  No weight gain or loss  SKIN:  No rashes or lacerations  HEAD:  No headaches  EYES:  No exophthalmos, jaundice or blindness  EARS:  No dizziness, tinnitus or hearing loss  NOSE:  No changes in smell  MOUTH & THROAT:  No dyskinetic movements or obvious goiter  CHEST:  No shortness of breath, hyperventilation or cough  CARDIOVASCULAR:  No tachycardia or chest pain  ABDOMEN:  No nausea, vomiting, pain, constipation or diarrhea  URINARY:  No frequency, dysuria or sexual dysfunction  ENDOCRINE:  No polydipsia, polyuria  MUSCULOSKELETAL:  No pain or stiffness of the joints  NEUROLOGIC:  No weakness, sensory changes, seizures, confusion, memory loss, tremor or other abnormal movements  Physical Exam         Filed Vitals:     01/28/13 0925   BP: 143/84   Pulse: 104   Resp: 18      Physical Examination:   General appearance - alert, well appearing, and in no distress  Mental status - alert, oriented to person, place, and time  Eyes - pupils equal and reactive, extraocular eye movements intact  Ears - bilateral TM's and external ear canals normal  Nose - normal and patent, no erythema, discharge or polyps  Mouth - mucous membranes moist, pharynx normal without lesions  Neck - supple, no significant adenopathy  Chest - clear to auscultation, no wheezes, rales or rhonchi, symmetric air entry  Heart - normal rate, regular rhythm, normal S1, S2, no murmurs, rubs, clicks  or gallops  Abdomen - soft, nontender, nondistended, no masses or organomegaly  Pelvic - normal external genitalia  normal mucosa without prolapse or lesions  Rectal - normal rectal, no masses  Back exam - full range of motion, no tenderness, palpable spasm or pain on motion  Neurological - alert, oriented, normal speech, no focal findings or movement disorder noted  Musculoskeletal - no joint tenderness, deformity or swelling     LABS:        Lab Results   Component Value Date     CREATININE 0.9 11/26/2012     CREATININE 0.7 5/31/2012     CREATININE 0.7 11/7/2011      Assessment and Plan:  Urgency-frequency syndrome  -     solifenacin (VESICARE) 5 MG tablet; Take 2 tablets (10 mg total) by mouth once daily.  Dispense: 30 tablet; Refill: 11  -     oxybutynin (DITROPAN-XL) 10 MG 24 hr tablet; Take 1 tablet (10 mg total) by mouth once daily.  Dispense: 30 tablet; Refill: 11  -     mirabegron (MYRBETRIQ) 50 mg Tb24; Take 1 tablet (50 mg total) by mouth once daily.  Dispense: 30 tablet; Refill: 11    Stress incontinence  -     imipramine (TOFRANIL) 25 MG tablet; Take 1 tablet (25 mg total) by mouth 3 (three) times daily.  Dispense: 90 tablet; Refill: 11      Kegel exercise.  Continue Imipramine for HANSA.  As an alternative to Detrol LA, she can try either oxybutynin xl ( not oxybutynin due to dry mouth), vesicare 5 to 10 mg or Myrbetriq.  All questions answered.  I spent 25 minutes with the patient of which more than half was spent in direct consultation with the patient in regards to our treatment and plan.      Follow up:  Follow-up in about 1 year (around 5/11/2019).

## 2018-05-15 ENCOUNTER — HOSPITAL ENCOUNTER (OUTPATIENT)
Dept: RADIOLOGY | Facility: HOSPITAL | Age: 83
Discharge: HOME | End: 2018-05-15
Attending: INTERNAL MEDICINE
Payer: COMMERCIAL

## 2018-05-15 DIAGNOSIS — Z12.31 VISIT FOR SCREENING MAMMOGRAM: ICD-10-CM

## 2018-05-15 PROCEDURE — 77063 BREAST TOMOSYNTHESIS BI: CPT | Mod: 26,,, | Performed by: RADIOLOGY

## 2018-05-15 PROCEDURE — 77067 SCR MAMMO BI INCL CAD: CPT | Mod: TC,PO

## 2018-05-15 PROCEDURE — 77067 SCR MAMMO BI INCL CAD: CPT | Mod: 26,,, | Performed by: RADIOLOGY

## 2018-05-18 ENCOUNTER — PATIENT MESSAGE (OUTPATIENT)
Dept: INTERNAL MEDICINE | Facility: CLINIC | Age: 83
End: 2018-05-18

## 2018-05-22 RX ORDER — ESCITALOPRAM OXALATE 20 MG/1
TABLET ORAL
Qty: 90 TABLET | Refills: 3 | Status: SHIPPED | OUTPATIENT
Start: 2018-05-22 | End: 2018-07-16

## 2018-05-22 RX ORDER — TOLTERODINE 4 MG/1
CAPSULE, EXTENDED RELEASE ORAL
Qty: 90 CAPSULE | Refills: 3 | Status: SHIPPED | OUTPATIENT
Start: 2018-05-22 | End: 2018-08-10

## 2018-05-23 ENCOUNTER — OFFICE VISIT (OUTPATIENT)
Dept: OPTOMETRY | Facility: CLINIC | Age: 83
End: 2018-05-23
Payer: COMMERCIAL

## 2018-05-23 DIAGNOSIS — Z96.1 PSEUDOPHAKIA OF BOTH EYES: Primary | ICD-10-CM

## 2018-05-23 DIAGNOSIS — H04.123 DRY EYES, BILATERAL: ICD-10-CM

## 2018-05-23 DIAGNOSIS — H52.203 ASTIGMATISM OF BOTH EYES WITH PRESBYOPIA: ICD-10-CM

## 2018-05-23 DIAGNOSIS — Z13.5 GLAUCOMA SCREENING: ICD-10-CM

## 2018-05-23 DIAGNOSIS — H52.4 ASTIGMATISM OF BOTH EYES WITH PRESBYOPIA: ICD-10-CM

## 2018-05-23 PROCEDURE — 92014 COMPRE OPH EXAM EST PT 1/>: CPT | Mod: S$GLB,,, | Performed by: OPTOMETRIST

## 2018-05-23 PROCEDURE — 99999 PR PBB SHADOW E&M-EST. PATIENT-LVL II: CPT | Mod: PBBFAC,,, | Performed by: OPTOMETRIST

## 2018-05-23 PROCEDURE — 92015 DETERMINE REFRACTIVE STATE: CPT | Mod: S$GLB,,, | Performed by: OPTOMETRIST

## 2018-05-23 NOTE — PROGRESS NOTES
HPI     DLS: 7/10/2017 with Artem  Denies eye pain and flashes. H/o floater right eye.  No itching, burning or tearing.   No noticeable VA changes since last visit.   Wears otc +2.50 for near--no distance problems  No problems with glare.     Meds: Systane ou PRN     Sp PC IOL OU/Yag OU    Last edited by Tray Stratton, OD on 5/23/2018  1:44 PM. (History)        ROS     Positive for: Eyes (cat surgery OU/YAG OU)    Negative for: Constitutional, Gastrointestinal, Neurological, Skin,   Genitourinary, Musculoskeletal, HENT, Endocrine, Cardiovascular,   Respiratory, Psychiatric, Allergic/Imm, Heme/Lymph    Last edited by Tray Stratton, OD on 5/23/2018  1:44 PM. (History)        Assessment /Plan     For exam results, see Encounter Report.    Pseudophakia of both eyes    Dry eyes, bilateral    Glaucoma screening    Astigmatism of both eyes with presbyopia        1. Sp pciol/YAG OU--pt wishes new Rx  2. JOHN--pt to cont w ATs    PLAN:    rtc 1 yr

## 2018-06-20 ENCOUNTER — LAB VISIT (OUTPATIENT)
Dept: LAB | Facility: HOSPITAL | Age: 83
End: 2018-06-20
Attending: INTERNAL MEDICINE
Payer: COMMERCIAL

## 2018-06-20 DIAGNOSIS — E03.9 HYPOTHYROIDISM, UNSPECIFIED TYPE: ICD-10-CM

## 2018-06-20 LAB
T4 FREE SERPL-MCNC: 1.42 NG/DL
TSH SERPL DL<=0.005 MIU/L-ACNC: 0.34 UIU/ML

## 2018-06-20 PROCEDURE — 36415 COLL VENOUS BLD VENIPUNCTURE: CPT | Mod: PO

## 2018-06-20 PROCEDURE — 84439 ASSAY OF FREE THYROXINE: CPT

## 2018-06-20 PROCEDURE — 84443 ASSAY THYROID STIM HORMONE: CPT

## 2018-06-26 ENCOUNTER — PATIENT MESSAGE (OUTPATIENT)
Dept: INTERNAL MEDICINE | Facility: CLINIC | Age: 83
End: 2018-06-26

## 2018-06-26 DIAGNOSIS — E03.9 HYPOTHYROIDISM, UNSPECIFIED TYPE: ICD-10-CM

## 2018-06-26 RX ORDER — LEVOTHYROXINE SODIUM 175 UG/1
175 TABLET ORAL DAILY
Qty: 90 TABLET | Refills: 1 | Status: SHIPPED | OUTPATIENT
Start: 2018-06-26 | End: 2019-01-10 | Stop reason: SDUPTHER

## 2018-07-09 NOTE — PROGRESS NOTES
Subjective:       Patient ID: Harriet Rutherford is a 82 y.o. female.    Chief Complaint: Follow-up (2 months )    Patient is a 82 y.o.female who presents today for follow up.    Chronic medical issues:  Hypothyroidism: recent TSH elevated, currently on 175 mcg synthroid     Depression: She has been on Lexapro 20 mg for many years, she feels like it's not working.  Medication recently changed to celexa.     HTN: She does not check at home, well-controlled on current medications, no chest pain or shortness of breath, no dizziness or lightheadedness     HLD: Stable on statin     Stress incontinence: Follows with Dr. Kumar     Health Maintenance     Vaccines: Influenza UTD (yearly);  Tetanus 2009 (every 10 yrs - 1st tdap);   Pneumovax 2009, due (>66yo);  Prevnar 2016  Zoster (>59yo) 2009  Breast Cancer: Mammogram up to date   Cervical Cancer: Pap Smear s/p JOSSUE   Colorectal Cancer: Colonoscopy 2016, due 2021  DEXA: 2018  Review of Systems   Constitutional: Negative for activity change, appetite change, chills, diaphoresis, fatigue, fever and unexpected weight change.   HENT: Negative for congestion, dental problem, ear discharge, ear pain, hearing loss, postnasal drip, rhinorrhea, sinus pressure, sore throat and trouble swallowing.    Eyes: Negative for discharge, redness, itching and visual disturbance.   Respiratory: Negative for cough, chest tightness, shortness of breath and wheezing.    Cardiovascular: Negative for chest pain, palpitations and leg swelling.   Gastrointestinal: Negative for abdominal pain, blood in stool, constipation, diarrhea, nausea and vomiting.   Endocrine: Negative for cold intolerance, heat intolerance, polydipsia and polyuria.   Genitourinary: Negative for difficulty urinating, dysuria, frequency, hematuria, menstrual problem and urgency.   Musculoskeletal: Negative for arthralgias, back pain, gait problem, joint swelling, myalgias and neck pain.   Skin: Negative for color change and rash.    Neurological: Negative for dizziness, syncope and headaches.   Hematological: Negative for adenopathy.   Psychiatric/Behavioral: Positive for dysphoric mood. Negative for behavioral problems, confusion and sleep disturbance. The patient is not nervous/anxious.        Objective:      Physical Exam   Constitutional: She is oriented to person, place, and time. She appears well-developed and well-nourished. No distress.   HENT:   Head: Normocephalic and atraumatic.   Right Ear: External ear normal.   Left Ear: External ear normal.   Nose: Nose normal.   Mouth/Throat: Oropharynx is clear and moist. No oropharyngeal exudate.   Eyes: Conjunctivae and EOM are normal. Pupils are equal, round, and reactive to light. Right eye exhibits no discharge. Left eye exhibits no discharge. No scleral icterus.   Neck: Normal range of motion. Neck supple. No JVD present. No thyromegaly present.   Cardiovascular: Normal rate, regular rhythm, normal heart sounds and intact distal pulses.  Exam reveals no gallop and no friction rub.    No murmur heard.  Pulmonary/Chest: Effort normal and breath sounds normal. No stridor. No respiratory distress. She has no wheezes. She has no rales. She exhibits no tenderness.   Abdominal: Soft. Bowel sounds are normal. She exhibits no distension. There is no tenderness. There is no rebound.   Musculoskeletal: Normal range of motion. She exhibits no edema or tenderness.   Lymphadenopathy:     She has no cervical adenopathy.   Neurological: She is alert and oriented to person, place, and time. No cranial nerve deficit.   Skin: Skin is warm and dry. No rash noted. She is not diaphoretic. No erythema.   Psychiatric: She has a normal mood and affect. Her behavior is normal.   Nursing note and vitals reviewed.      Assessment and Plan:       1. Hyperlipidemia, unspecified hyperlipidemia type  - good control    2. Hypertension due to endocrine disorder  -good control    3. Hypothyroidism, unspecified type  -  repeat tsh in one month    4. Leg swelling    - COMPRESSION STOCKINGS    5. PAD (peripheral artery disease)    - COMPRESSION STOCKINGS    6. Moderate episode of recurrent major depressive disorder  - stop celexa  - restart lexapro 20  - start wellbutrin 150 mg  - rtc in one month  - denies SI or HI          No Follow-up on file.

## 2018-07-11 ENCOUNTER — PATIENT MESSAGE (OUTPATIENT)
Dept: ADMINISTRATIVE | Facility: OTHER | Age: 83
End: 2018-07-11

## 2018-07-16 ENCOUNTER — PATIENT MESSAGE (OUTPATIENT)
Dept: ADMINISTRATIVE | Facility: OTHER | Age: 83
End: 2018-07-16

## 2018-07-16 ENCOUNTER — OFFICE VISIT (OUTPATIENT)
Dept: INTERNAL MEDICINE | Facility: CLINIC | Age: 83
End: 2018-07-16
Payer: COMMERCIAL

## 2018-07-16 VITALS
BODY MASS INDEX: 34.57 KG/M2 | RESPIRATION RATE: 18 BRPM | SYSTOLIC BLOOD PRESSURE: 132 MMHG | WEIGHT: 220.25 LBS | HEIGHT: 67 IN | HEART RATE: 94 BPM | OXYGEN SATURATION: 95 % | TEMPERATURE: 98 F | DIASTOLIC BLOOD PRESSURE: 68 MMHG

## 2018-07-16 DIAGNOSIS — I15.2 HYPERTENSION DUE TO ENDOCRINE DISORDER: Chronic | ICD-10-CM

## 2018-07-16 DIAGNOSIS — I73.9 PAD (PERIPHERAL ARTERY DISEASE): ICD-10-CM

## 2018-07-16 DIAGNOSIS — F33.1 MODERATE EPISODE OF RECURRENT MAJOR DEPRESSIVE DISORDER: ICD-10-CM

## 2018-07-16 DIAGNOSIS — M79.89 LEG SWELLING: ICD-10-CM

## 2018-07-16 DIAGNOSIS — E78.5 HYPERLIPIDEMIA, UNSPECIFIED HYPERLIPIDEMIA TYPE: Primary | Chronic | ICD-10-CM

## 2018-07-16 DIAGNOSIS — E03.9 HYPOTHYROIDISM, UNSPECIFIED TYPE: Chronic | ICD-10-CM

## 2018-07-16 PROCEDURE — 3078F DIAST BP <80 MM HG: CPT | Mod: CPTII,S$GLB,, | Performed by: INTERNAL MEDICINE

## 2018-07-16 PROCEDURE — 99999 PR PBB SHADOW E&M-EST. PATIENT-LVL III: CPT | Mod: PBBFAC,,, | Performed by: INTERNAL MEDICINE

## 2018-07-16 PROCEDURE — 99214 OFFICE O/P EST MOD 30 MIN: CPT | Mod: S$GLB,,, | Performed by: INTERNAL MEDICINE

## 2018-07-16 PROCEDURE — 3075F SYST BP GE 130 - 139MM HG: CPT | Mod: CPTII,S$GLB,, | Performed by: INTERNAL MEDICINE

## 2018-07-16 RX ORDER — ESCITALOPRAM OXALATE 20 MG/1
20 TABLET ORAL DAILY
Qty: 30 TABLET | Refills: 11 | Status: SHIPPED | OUTPATIENT
Start: 2018-07-16 | End: 2019-07-12 | Stop reason: SDUPTHER

## 2018-07-16 RX ORDER — BUPROPION HYDROCHLORIDE 150 MG/1
150 TABLET ORAL DAILY
Qty: 30 TABLET | Refills: 11 | Status: SHIPPED | OUTPATIENT
Start: 2018-07-16 | End: 2019-07-09 | Stop reason: SDUPTHER

## 2018-07-19 ENCOUNTER — PATIENT MESSAGE (OUTPATIENT)
Dept: ADMINISTRATIVE | Facility: OTHER | Age: 83
End: 2018-07-19

## 2018-07-19 DIAGNOSIS — G47.00 INSOMNIA, UNSPECIFIED TYPE: ICD-10-CM

## 2018-07-20 RX ORDER — TEMAZEPAM 30 MG/1
CAPSULE ORAL
Qty: 30 CAPSULE | Refills: 1 | Status: SHIPPED | OUTPATIENT
Start: 2018-07-20 | End: 2018-09-25 | Stop reason: SDUPTHER

## 2018-07-24 ENCOUNTER — PATIENT MESSAGE (OUTPATIENT)
Dept: ADMINISTRATIVE | Facility: OTHER | Age: 83
End: 2018-07-24

## 2018-07-30 ENCOUNTER — PATIENT OUTREACH (OUTPATIENT)
Dept: OTHER | Facility: OTHER | Age: 83
End: 2018-07-30

## 2018-07-30 NOTE — PROGRESS NOTES
Last 5 Patient Entered Readings                                      Current 30 Day Average: 107/74     Recent Readings 7/24/2018    SBP (mmHg) 107    DBP (mmHg) 74    Pulse 103          Ms. Harriet Rutherford is a 82 y.o. female who is newly enrolled in the [facility].     The following information was reviewed/updated:  Preferred pharmacy   [id number] - [place], [place] - [address]  [address] [address] [address]  Phone: [phone] Fax: [phone]      Patient prefers a 30 days supply    HYPERTENSION    Explained that we expect patient to obtain several blood pressures per week at random times of day.   Our goal is to get  BP to consistently below 130/80mmHg and make the process convenient so patient can avoid extra trips to the office. Getting your blood pressure below 130/80mmHg (definition of control) will reduce your risk for heart attack, kidney failure, stroke and death (as well as kidney failure, eye disease, & dementia).     Patient is meeting the goal already. Current BP average 107/74 mmHg.  When asked what the patient thinks is causing BP to be elevated, she states: NA    Discussed appropriate BP measuring technique:  Before taking your blood pressure, find a quiet place. You will need to listen for your heartbeat.  Roll up the sleeve on your left arm or remove any tight-sleeved clothing, if needed. (It's best to take your blood pressure from your left arm if you are right-handed.You can use the other arm if you have been told by your health care provider to do so.)  Rest in a chair next to a table for 5 to 10 minutes. (Your left arm should rest comfortably at heart level.)  Sit up straight with your back against the chair, legs uncrossed and on the ground.  Rest your forearm on the table with the palm of your hand facing up.  You should not talk, read the newspaper, or watch television during this process.    Last 5 Patient Entered Readings                                       Current 30 Day Average: 107/74     Recent Readings 7/24/2018    SBP (mmHg) 107    DBP (mmHg) 74    Pulse 103          Instructed patient not to allow anyone else to use phone and BP cuff or glucometer.   I'm not available for emergencies. Patient will call [facility] on-call ([phone] or [phone]) or [phone] if needed.     Patient and I agreed that she will continue to monitor blood pressure and sodium intake and continue to remain adherent to medications.     I will plan to follow-up with the patient in 3 weeks.   Emailed patient link to [facility]'s Hypertension webpages and my contact information in case she has any questions.    Lifestyle Assessment:    Diet: <2,000mg of sodium.  Patient states that she is trying to not add salt to her food.  Finds herself eating out more at [employer] or [employer].  Informed patient about sodium in seasonings and sauces restaurant uses.  Encouraged patient to focus on vegetables and low sodium seasonings.  Only cooks when grandchildren are in town.    Exercise: Patient states that she has ended her membership at her current gym and will start going to [employer] once her knee is healed.  Has torn meniscus and would like it to heal before starting exercise.  Was informed about pool exercises.  Encouraged patient that is a great form of physical activity without the added stress.    Alcohol/Tobacco: Patient states she has 1 drink per month.  Quit smoking two year ago.     Medication: Patient reports no complaints with medication.    Reviewed proper blood pressure technique.  Informed patient to send at least one reading per day.

## 2018-07-30 NOTE — LETTER
Fatou Sanchez, PharmD  [address] [facility] [address]     Dear Harriet Rutherford,    Welcome to the [facility]!           My name is Fatou Sanchez PharmD and I am your dedicated Digital Medicine clinician.  As an expert in medication management, I will help ensure that the medications you are taking continue to provide you with the intended benefits.        I am Conor Scott and I will be your health  for the duration of the program.  My  job is to help you identify lifestyle changes to improve your blood pressure control.  We will talk about nutrition, exercise, and other ways that you may be able to adjust your current habits to better your health. Together, we will work to improve your overall health and encourage you to meet your goals for a healthier lifestyle.    What we expect from YOU:    You will need to take blood pressure readings multiple times a week and no less than one reading per week.   It is important that you take your measurements at different times during the day, when possible.     What you should expect from your Digital Medicine Care Team:   We will provide you with education about high blood pressure, including lifestyle changes that could help you to control your blood pressure.   We will review your weekly readings and provide you with monthly blood pressure progress reports after you have been in the program for more than 30 days.   We will send monthly progress reports on your blood pressure control to your physician so they can follow along with your progress as well.    You will be able to reach me by phone at [phone] or through your [facility] account by clicking my name under Care Team on the right side of the home screen.    I look forward to working with you to achieve your blood pressure goals!    Sincerely,    Fatou Sanchez PharmD  Your personal clinician    Please visit  [email or web address] to learn more about high blood pressure and what you can do lower your blood pressure.                                                                                           Harriet Rutherford  [address] [place] [address]

## 2018-08-02 RX ORDER — CITALOPRAM 20 MG/1
TABLET, FILM COATED ORAL
Qty: 30 TABLET | Refills: 1 | OUTPATIENT
Start: 2018-08-02

## 2018-08-08 ENCOUNTER — PATIENT OUTREACH (OUTPATIENT)
Dept: OTHER | Facility: OTHER | Age: 83
End: 2018-08-08

## 2018-08-08 NOTE — PROGRESS NOTES
Last 5 Patient Entered Readings                                      Current 30 Day Average: 141/77     Recent Readings 8/6/2018 8/4/2018 8/3/2018 8/1/2018 7/31/2018    SBP (mmHg) 148 131 150 157 151    DBP (mmHg) 77 73 75 85 75    Pulse 96 92 97 89 90      Patient's BP average is above goal of <130/80.     Patient denies s/s of hypotension (lightheadedness, dizziness, nausea, fatigue) associated with low readings. Instructed patient to inform me if this occurs, patient confirms understanding.      Patient denies s/s of hypertension (SOB, CP, severe headaches, changes in vision) associated with high readings. Instructed patient to go to the ED if BP > 180/110 and accompanied by hypertensive s/s, patient confirms understanding.    Will continue to monitor regularly. Will follow up in 2-3 weeks, sooner if BP begins to trend upward or downward.    Patient has my contact information and knows to call with any concerns or clinical changes.     Current HTN regimen:  Hypertension Medications             amLODIPine (NORVASC) 10 MG tablet Take 1 tablet (10 mg total) by mouth once daily.    hydroCHLOROthiazide (HYDRODIURIL) 25 MG tablet Take 1 tablet (25 mg total) by mouth once daily.    losartan (COZAAR) 100 MG tablet Take 1 tablet (100 mg total) by mouth once daily.      83 yo female with PMH HTN, HLD, hypothyroidism, overactive bladder, PAD and depression. Patient forgot to refill her blood pressure medications causing her higher readings 7/31-8/3. Patient uses a pill container for AM and PM to help with compliance.

## 2018-08-09 NOTE — PROGRESS NOTES
Subjective:       Patient ID: Harriet Rutherford is a 83 y.o. female.    Chief Complaint: Follow-up (1 month)    Patient is a 83 y.o.female who presents today for follow up.    Chronic medical issues:  Hypothyroidism: recent TSH elevated, currently on 175 mcg synthroid     Depression: she is taking   lexapro 20 and wellbutrin 150 mg. Feels much better. Got two quilts done. Feels in great spirits. Has more motivation to do things.    HTN: She does not check at home, well-controlled on current medications, no chest pain or shortness of breath, no dizziness or lightheadedness     HLD: Stable on statin     Stress incontinence: Follows with Dr. Kumar     Health Maintenance     Breast Cancer: Mammogram up to date   Cervical Cancer: Pap Smear s/p [facility]   Colorectal Cancer: Colonoscopy 2016, due 2021  DEXA: 2018    Review of Systems   Constitutional: Positive for activity change. Negative for appetite change, chills, diaphoresis, fatigue, fever and unexpected weight change.   HENT: Negative for congestion, dental problem, ear discharge, ear pain, hearing loss, postnasal drip, rhinorrhea, sinus pressure, sore throat and trouble swallowing.    Eyes: Negative for discharge, redness, itching and visual disturbance.   Respiratory: Negative for cough, chest tightness, shortness of breath and wheezing.    Cardiovascular: Negative for chest pain, palpitations and leg swelling.   Gastrointestinal: Negative for abdominal pain, blood in stool, constipation, diarrhea, nausea and vomiting.   Endocrine: Negative for cold intolerance, heat intolerance, polydipsia and polyuria.   Genitourinary: Negative for difficulty urinating, dysuria, frequency, hematuria, menstrual problem and urgency.   Musculoskeletal: Positive for arthralgias. Negative for back pain, gait problem, joint swelling, myalgias and neck pain.   Skin: Negative for color change and rash.   Neurological: Negative for dizziness, syncope, weakness and headaches.   Hematological: Negative  for adenopathy.   Psychiatric/Behavioral: Negative for behavioral problems, confusion, dysphoric mood and sleep disturbance. The patient is not nervous/anxious.        Objective:      Physical Exam   Constitutional: She is oriented to person, place, and time. She appears well-developed and well-nourished. No distress.   HENT:   Head: Normocephalic and atraumatic.   Right Ear: External ear normal.   Left Ear: External ear normal.   Nose: Nose normal.   Mouth/Throat: Oropharynx is clear and moist. No oropharyngeal exudate.   Eyes: Conjunctivae and EOM are normal. Pupils are equal, round, and reactive to light. Right eye exhibits no discharge. Left eye exhibits no discharge. No scleral icterus.   Neck: Normal range of motion. Neck supple. No JVD present. No thyromegaly present.   Cardiovascular: Normal rate, regular rhythm, normal heart sounds and intact distal pulses.  Exam reveals no gallop and no friction rub.    No murmur heard.  Pulmonary/Chest: Effort normal and breath sounds normal. No stridor. No respiratory distress. She has no wheezes. She has no rales. She exhibits no tenderness.   Abdominal: Soft. Bowel sounds are normal. She exhibits no distension. There is no tenderness. There is no rebound.   Musculoskeletal: Normal range of motion. She exhibits no edema or tenderness.   Lymphadenopathy:     She has no cervical adenopathy.   Neurological: She is alert and oriented to person, place, and time. No cranial nerve deficit.   Skin: Skin is warm and dry. No rash noted. She is not diaphoretic. No erythema.   Psychiatric: She has a normal mood and affect. Her behavior is normal.   Nursing note and vitals reviewed.      Assessment and Plan:       1. Moderate episode of recurrent major depressive disorder  - stable on lexapro and wellbutrin; continue current doses  - rtc in six months for f/u          No Follow-up on file.

## 2018-08-10 ENCOUNTER — OFFICE VISIT (OUTPATIENT)
Dept: INTERNAL MEDICINE | Facility: CLINIC | Age: 83
End: 2018-08-10
Payer: COMMERCIAL

## 2018-08-10 ENCOUNTER — LAB VISIT (OUTPATIENT)
Dept: LAB | Facility: HOSPITAL | Age: 83
End: 2018-08-10
Attending: INTERNAL MEDICINE
Payer: COMMERCIAL

## 2018-08-10 VITALS
DIASTOLIC BLOOD PRESSURE: 82 MMHG | BODY MASS INDEX: 33.49 KG/M2 | TEMPERATURE: 98 F | SYSTOLIC BLOOD PRESSURE: 110 MMHG | HEART RATE: 92 BPM | HEIGHT: 67 IN | WEIGHT: 213.38 LBS | RESPIRATION RATE: 16 BRPM

## 2018-08-10 DIAGNOSIS — E03.9 HYPOTHYROIDISM, UNSPECIFIED TYPE: ICD-10-CM

## 2018-08-10 DIAGNOSIS — F33.1 MODERATE EPISODE OF RECURRENT MAJOR DEPRESSIVE DISORDER: Primary | ICD-10-CM

## 2018-08-10 LAB
T4 FREE SERPL-MCNC: 1.42 NG/DL
TSH SERPL DL<=0.005 MIU/L-ACNC: 0.24 UIU/ML

## 2018-08-10 PROCEDURE — 99999 PR PBB SHADOW E&M-EST. PATIENT-LVL III: CPT | Mod: PBBFAC,,, | Performed by: INTERNAL MEDICINE

## 2018-08-10 PROCEDURE — 3074F SYST BP LT 130 MM HG: CPT | Mod: CPTII,S$GLB,, | Performed by: INTERNAL MEDICINE

## 2018-08-10 PROCEDURE — 99213 OFFICE O/P EST LOW 20 MIN: CPT | Mod: S$GLB,,, | Performed by: INTERNAL MEDICINE

## 2018-08-10 PROCEDURE — 3079F DIAST BP 80-89 MM HG: CPT | Mod: CPTII,S$GLB,, | Performed by: INTERNAL MEDICINE

## 2018-08-10 PROCEDURE — 36415 COLL VENOUS BLD VENIPUNCTURE: CPT | Mod: PO

## 2018-08-10 PROCEDURE — 84439 ASSAY OF FREE THYROXINE: CPT

## 2018-08-10 PROCEDURE — 84443 ASSAY THYROID STIM HORMONE: CPT

## 2018-08-21 ENCOUNTER — PATIENT OUTREACH (OUTPATIENT)
Dept: OTHER | Facility: OTHER | Age: 83
End: 2018-08-21

## 2018-08-21 NOTE — PROGRESS NOTES
Last 5 Patient Entered Readings                                      Current 30 Day Average: 136/73     Recent Readings 8/18/2018 8/15/2018 8/13/2018 8/10/2018 8/9/2018    SBP (mmHg) 137 114 130 155 133    DBP (mmHg) 75 72 68 78 68    Pulse 86 81 89 87 91          Digital Medicine: Health  Follow Up    Lifestyle Modifications:    1.Dietary Modifications (Sodium intake <2,000mg/day, food labels, dining out): Patient reports she is cutting down on salt, but finds it boring and tasteless.  Wants to go shopping for new seasonings.  Offered some recipes and salt free seasoning options.  Patient agreed.  Emailed quick and easy recipes and seasoning list.    2.Physical Activity: Patient states that she and her doctor decided not to go through with knee replacement.  Would like to sign up for [employer] to sign up for pool classes twice a week.  Encouraged patient that would be beneficial.    3.Medication Therapy: Patient has been compliant with the medication regimen.    4.Patient has the following medication side effects/concerns: none.  (Frequency/Alleviating factors/Precipitating factors, etc.)     Follow up with Ms. Harriet Rutherford completed. No further questions or concerns. Will continue follow up to achieve health goals.

## 2018-08-22 ENCOUNTER — PATIENT OUTREACH (OUTPATIENT)
Dept: OTHER | Facility: OTHER | Age: 83
End: 2018-08-22

## 2018-08-22 DIAGNOSIS — I10 HYPERTENSION, UNSPECIFIED TYPE: Primary | Chronic | ICD-10-CM

## 2018-08-22 RX ORDER — IRBESARTAN 300 MG/1
300 TABLET ORAL DAILY
Qty: 30 TABLET | Refills: 3 | Status: SHIPPED | OUTPATIENT
Start: 2018-08-22 | End: 2018-12-11 | Stop reason: SDUPTHER

## 2018-08-22 NOTE — PROGRESS NOTES
Last 5 Patient Entered Readings                                      Current 30 Day Average: 136/73     Recent Readings 8/18/2018 8/15/2018 8/13/2018 8/10/2018 8/9/2018    SBP (mmHg) 137 114 130 155 133    DBP (mmHg) 75 72 68 78 68    Pulse 86 81 89 87 91        Patient's BP average is above goal of <130/80.     Patient denies s/s of hypotension (lightheadedness, dizziness, nausea, fatigue) associated with low readings. Instructed patient to inform me if this occurs, patient confirms understanding.      Patient denies s/s of hypertension (SOB, CP, severe headaches, changes in vision) associated with high readings. Instructed patient to go to the ED if BP > 180/110 and accompanied by hypertensive s/s, patient confirms understanding.    Will continue to monitor regularly. Will follow up in 2-3 weeks, sooner if BP begins to trend upward or downward.    Patient has my contact information and knows to call with any concerns or clinical changes.     Current HTN regimen:  Hypertension Medications             amLODIPine (NORVASC) 10 MG tablet Take 1 tablet (10 mg total) by mouth once daily.    hydroCHLOROthiazide (HYDRODIURIL) 25 MG tablet Take 1 tablet (25 mg total) by mouth once daily.    losartan (COZAAR) 100 MG tablet Take 1 tablet (100 mg total) by mouth once daily.      Patient will change losartan to irbesartan 300mg daily to help improve her blood pressure.

## 2018-09-04 RX ORDER — CITALOPRAM 20 MG/1
TABLET, FILM COATED ORAL
Qty: 30 TABLET | Refills: 1 | OUTPATIENT
Start: 2018-09-04

## 2018-09-05 ENCOUNTER — PATIENT OUTREACH (OUTPATIENT)
Dept: OTHER | Facility: OTHER | Age: 83
End: 2018-09-05

## 2018-09-05 NOTE — PROGRESS NOTES
Last 5 Patient Entered Readings                                      Current 30 Day Average: 133/71     Recent Readings 9/5/2018 9/3/2018 9/3/2018 9/3/2018 9/2/2018    SBP (mmHg) 139 101 99 151 142    DBP (mmHg) 62 64 66 81 78    Pulse 88 102 101 106 100        Patient's BP average is controlled based on 2017 ACC/AHA HTN guidelines of goal BP <130/80.      Patient denies s/s of hypotension (lightheadedness, dizziness, nausea, fatigue) associated with low readings. Instructed patient to inform me if this occurs, patient confirms understanding.      Patient denies s/s of hypertension (SOB, CP, severe headaches, changes in vision) associated with high readings. Instructed patient to go to the ED if BP > 180/110 and accompanied by hypertensive s/s, patient confirms understanding.     Patient's health , Conor Scott, will be following up every 3-4 weeks. I will continue to monitor regularly and will follow up in 3-4 months, sooner if BP begins to trend upward or downward.    Patient has my contact information and knows to call with any concerns or clinical changes.     Current HTN regimen:  Hypertension Medications             amLODIPine (NORVASC) 10 MG tablet Take 1 tablet (10 mg total) by mouth once daily.    hydroCHLOROthiazide (HYDRODIURIL) 25 MG tablet Take 1 tablet (25 mg total) by mouth once daily.    irbesartan (AVAPRO) 300 MG tablet Take 1 tablet (300 mg total) by mouth once daily.

## 2018-09-18 ENCOUNTER — PATIENT OUTREACH (OUTPATIENT)
Dept: OTHER | Facility: OTHER | Age: 83
End: 2018-09-18

## 2018-09-25 DIAGNOSIS — G47.00 INSOMNIA, UNSPECIFIED TYPE: ICD-10-CM

## 2018-09-25 RX ORDER — TEMAZEPAM 30 MG/1
CAPSULE ORAL
Qty: 30 CAPSULE | Refills: 3 | Status: SHIPPED | OUTPATIENT
Start: 2018-09-25 | End: 2019-01-22 | Stop reason: SDUPTHER

## 2018-10-01 RX ORDER — CITALOPRAM 20 MG/1
TABLET, FILM COATED ORAL
Qty: 30 TABLET | Refills: 1 | OUTPATIENT
Start: 2018-10-01

## 2018-10-09 NOTE — PROGRESS NOTES
Last 5 Patient Entered Readings                                      Current 30 Day Average: 121/67     Recent Readings 10/8/2018 10/8/2018 10/5/2018 10/2/2018 10/2/2018    SBP (mmHg) 119 129 148 99 103    DBP (mmHg) 64 59 78 57 57    Pulse 89 95 88 97 96        Chart reviewed. No BP medications require adjustments at this time.

## 2018-10-16 ENCOUNTER — PATIENT OUTREACH (OUTPATIENT)
Dept: OTHER | Facility: OTHER | Age: 83
End: 2018-10-16

## 2018-10-16 NOTE — PROGRESS NOTES
"Last 5 Patient Entered Readings                                      Current 30 Day Average: 121/66     Recent Readings 10/15/2018 10/14/2018 10/13/2018 10/12/2018 10/11/2018    SBP (mmHg) 119 118 110 131 115    DBP (mmHg) 64 68 64 67 63    Pulse 102 86 90 87 97          Digital Medicine: Health  Follow Up    Lifestyle Modifications:    1.Dietary Modifications (Sodium intake <2,000mg/day, food labels, dining out): States she has been focusing on salt.  Has been buying low sodium options, like low sodium chicken soup.  States she does not like the taste of a lot of salt anymore.    2.Physical Activity: Reports she enjoys working in her gardens.    3.Medication Therapy: Patient has been compliant with the medication regimen.    4.Patient has the following medication side effects/concerns: feeling dizzy or "whoozy" upon standing about 1-2x/day.  Placed task for pharmacist.  (Frequency/Alleviating factors/Precipitating factors, etc.)     Follow up with Ms. Harriet Rutherford completed. No further questions or concerns. Will continue to follow up to achieve health goals.    "

## 2018-10-17 ENCOUNTER — PATIENT OUTREACH (OUTPATIENT)
Dept: OTHER | Facility: OTHER | Age: 83
End: 2018-10-17

## 2018-10-17 DIAGNOSIS — I15.2 HYPERTENSION DUE TO ENDOCRINE DISORDER: Chronic | ICD-10-CM

## 2018-10-17 RX ORDER — AMLODIPINE BESYLATE 10 MG/1
5 TABLET ORAL DAILY
Qty: 90 TABLET | Refills: 3
Start: 2018-10-17 | End: 2018-11-30 | Stop reason: SDUPTHER

## 2018-10-17 NOTE — PROGRESS NOTES
Last 5 Patient Entered Readings                                      Current 30 Day Average: 121/66     Recent Readings 10/15/2018 10/14/2018 10/13/2018 10/12/2018 10/11/2018    SBP (mmHg) 119 118 110 131 115    DBP (mmHg) 64 68 64 67 63    Pulse 102 86 90 87 97        Patient's BP average is controlled based on 2017 ACC/AHA HTN guidelines of goal BP <140/90.      Patient has s/s of hypotension (lightheadedness, dizziness, nausea, fatigue) associated with low readings and is attributing to orthostatic hypotension. Instructed patient to inform me if this occurs, patient confirms understanding.      Patient denies s/s of hypertension (SOB, CP, severe headaches, changes in vision) associated with high readings. Instructed patient to go to the ED if BP > 180/110 and accompanied by hypertensive s/s, patient confirms understanding.     Current HTN regimen:  Hypertension Medications             amLODIPine (NORVASC) 10 MG tablet Take 1 tablet (10 mg total) by mouth once daily.    hydroCHLOROthiazide (HYDRODIURIL) 25 MG tablet Take 1 tablet (25 mg total) by mouth once daily.    irbesartan (AVAPRO) 300 MG tablet Take 1 tablet (300 mg total) by mouth once daily.      Patient will reduce amlodipine from 10mg daily to 5mg daily to help improve her orthostatic hypotension.    Patient's health , Conor Scott, will be following up every 3-4 weeks. I will continue to monitor regularly and will follow up in 3-4 weeks, sooner if BP begins to trend upward or downward.    Patient has my contact information and knows to call with any concerns or clinical changes.

## 2018-10-30 ENCOUNTER — PATIENT OUTREACH (OUTPATIENT)
Dept: OTHER | Facility: OTHER | Age: 83
End: 2018-10-30

## 2018-10-30 NOTE — PROGRESS NOTES
Last 5 Patient Entered Readings                                      Current 30 Day Average: 127/68     Recent Readings 10/30/2018 10/26/2018 10/25/2018 10/24/2018 10/21/2018    SBP (mmHg) 139 123 129 146 153    DBP (mmHg) 79 67 77 72 67    Pulse 88 97 101 90 103      Patient's BP average is controlled based on 2017 ACC/AHA HTN guidelines of goal BP <130/80.      Patient denies s/s of hypotension (lightheadedness, dizziness, nausea, fatigue) associated with low readings. Instructed patient to inform me if this occurs, patient confirms understanding.      Patient denies s/s of hypertension (SOB, CP, severe headaches, changes in vision) associated with high readings. Instructed patient to go to the ED if BP > 180/110 and accompanied by hypertensive s/s, patient confirms understanding.     Patient's health , Conor Scott, will be following up every 3-4 weeks. I will continue to monitor regularly and will follow up in 3-4 weeks, sooner if BP begins to trend upward or downward.    Patient has my contact information and knows to call with any concerns or clinical changes.     Current HTN regimen:  Hypertension Medications             amLODIPine (NORVASC) 10 MG tablet Take 0.5 tablets (5 mg total) by mouth once daily.    hydroCHLOROthiazide (HYDRODIURIL) 25 MG tablet Take 1 tablet (25 mg total) by mouth once daily.    irbesartan (AVAPRO) 300 MG tablet Take 1 tablet (300 mg total) by mouth once daily.

## 2018-11-08 RX ORDER — LEVOTHYROXINE SODIUM 200 UG/1
TABLET ORAL
Qty: 90 TABLET | Refills: 0 | OUTPATIENT
Start: 2018-11-08

## 2018-11-08 RX ORDER — CITALOPRAM 20 MG/1
TABLET, FILM COATED ORAL
Qty: 30 TABLET | Refills: 1 | OUTPATIENT
Start: 2018-11-08

## 2018-11-15 ENCOUNTER — PATIENT OUTREACH (OUTPATIENT)
Dept: OTHER | Facility: OTHER | Age: 83
End: 2018-11-15

## 2018-11-15 NOTE — PROGRESS NOTES
"Last 5 Patient Entered Readings                                      Current 30 Day Average: 134/72     Recent Readings 11/15/2018 11/15/2018 11/15/2018 11/10/2018 11/9/2018    SBP (mmHg) 116 155 169 123 164    DBP (mmHg) 59 77 85 59 84    Pulse 79 92 101 83 90          Digital Medicine: Health  Follow Up    Lifestyle Modifications:    1.Dietary Modifications (Sodium intake <2,000mg/day, food labels, dining out): Reports she is not losing weight, but has not been real "serious" about diet.  States she has been preoccupied with projects, such as working on a [employer].  Encouraged patient to be mindful of sodium content and portion control.    2.Physical Activity: States she does not like to exercise.  Reports she likes working in her garden.    3.Medication Therapy: Patient has been compliant with the medication regimen.    4.Patient has the following medication side effects/concerns:   (Frequency/Alleviating factors/Precipitating factors, etc.)     Follow up with Ms. Harriet Rutherford completed. No further questions or concerns. Will continue to follow up to achieve health goals.    Asked about higher readings around 12-1am this morning.  States she was aggravated that she was not able to sleep, and decided to take her blood pressure.  "

## 2018-11-27 ENCOUNTER — PATIENT OUTREACH (OUTPATIENT)
Dept: OTHER | Facility: OTHER | Age: 83
End: 2018-11-27

## 2018-11-27 DIAGNOSIS — I10 HYPERTENSION, UNSPECIFIED TYPE: Primary | Chronic | ICD-10-CM

## 2018-11-27 DIAGNOSIS — I15.2 HYPERTENSION DUE TO ENDOCRINE DISORDER: Chronic | ICD-10-CM

## 2018-11-27 NOTE — PROGRESS NOTES
Last 5 Patient Entered Readings                                      Current 30 Day Average: 137/72     Recent Readings 11/25/2018 11/24/2018 11/21/2018 11/17/2018 11/15/2018    SBP (mmHg) 138 143 145 146 116    DBP (mmHg) 77 66 69 73 59    Pulse 90 91 106 95 79      [facility] Patient answered then hung up. When called back, [facility]    11/30: Patient's BP average is above goal of <130/80.     Patient denies s/s of hypotension (lightheadedness, dizziness, nausea, fatigue) associated with low readings. Instructed patient to inform me if this occurs, patient confirms understanding.      Patient denies s/s of hypertension (SOB, CP, severe headaches, changes in vision) associated with high readings. Instructed patient to go to the ED if BP > 180/110 and accompanied by hypertensive s/s, patient confirms understanding.    Patient denies a lifestyle change attributing to her higher readings.     Patient and I agreed to resume amlodipine 10mg nightly.    Will continue to monitor regularly. Will follow up in 2-3 weeks, sooner if BP begins to trend upward or downward.    Patient has my contact information and knows to call with any concerns or clinical changes.     Current HTN regimen:  Hypertension Medications             amLODIPine (NORVASC) 10 MG tablet Take 0.5 tablets (5 mg total) by mouth once daily.    hydroCHLOROthiazide (HYDRODIURIL) 25 MG tablet Take 1 tablet (25 mg total) by mouth once daily.    irbesartan (AVAPRO) 300 MG tablet Take 1 tablet (300 mg total) by mouth once daily.

## 2018-11-29 ENCOUNTER — PATIENT MESSAGE (OUTPATIENT)
Dept: OTHER | Facility: OTHER | Age: 83
End: 2018-11-29

## 2018-11-30 RX ORDER — AMLODIPINE BESYLATE 10 MG/1
10 TABLET ORAL NIGHTLY
Qty: 90 TABLET | Refills: 3
Start: 2018-11-30 | End: 2019-07-24 | Stop reason: SDUPTHER

## 2018-12-11 DIAGNOSIS — I10 HYPERTENSION, UNSPECIFIED TYPE: Chronic | ICD-10-CM

## 2018-12-11 RX ORDER — HYDROCHLOROTHIAZIDE 25 MG/1
TABLET ORAL
Qty: 30 TABLET | Refills: 10 | Status: SHIPPED | OUTPATIENT
Start: 2018-12-11 | End: 2019-12-19 | Stop reason: SDUPTHER

## 2018-12-11 RX ORDER — IRBESARTAN 300 MG/1
TABLET ORAL
Qty: 30 TABLET | Refills: 10 | Status: SHIPPED | OUTPATIENT
Start: 2018-12-11 | End: 2019-02-12

## 2018-12-13 ENCOUNTER — PATIENT OUTREACH (OUTPATIENT)
Dept: OTHER | Facility: OTHER | Age: 83
End: 2018-12-13

## 2018-12-13 NOTE — PROGRESS NOTES
Last 5 Patient Entered Readings                                      Current 30 Day Average: 137/73     Recent Readings 12/11/2018 12/10/2018 12/10/2018 12/5/2018 12/5/2018    SBP (mmHg) 135 136 99 141 141    DBP (mmHg) 78 67 52 62 87    Pulse 93 92 95 86 104      Patient's BP average is controlled based on 2017 ACC/AHA HTN guidelines of goal BP <140/90.      Patient denies s/s of hypotension (lightheadedness, dizziness, nausea, fatigue) associated with low readings. Instructed patient to inform me if this occurs, patient confirms understanding.      Patient denies s/s of hypertension (SOB, CP, severe headaches, changes in vision) associated with high readings. Instructed patient to go to the ED if BP > 180/110 and accompanied by hypertensive s/s, patient confirms understanding.     She is tolerating amlodipine 10mg daily and will maintain this dose until follow-up.    Patient's health , Conor Scott, will be following up every 3-4 weeks. I will continue to monitor regularly and will follow up in 1-4 months, sooner if BP begins to trend upward or downward.    Patient has my contact information and knows to call with any concerns or clinical changes.     Current HTN regimen:  Hypertension Medications             amLODIPine (NORVASC) 10 MG tablet Take 1 tablet (10 mg total) by mouth every evening.    hydroCHLOROthiazide (HYDRODIURIL) 25 MG tablet Take 1 tablet (25 mg total) by mouth once daily.         irbesartan (AVAPRO) 300 MG tablet TAKE ONE TABLET BY MOUTH EVERY DAY.

## 2018-12-13 NOTE — PROGRESS NOTES
Last 5 Patient Entered Readings                                      Current 30 Day Average: 136/72     Recent Readings 12/13/2018 12/11/2018 12/10/2018 12/10/2018 12/5/2018    SBP (mmHg) 134 135 136 99 141    DBP (mmHg) 67 78 67 52 62    Pulse 102 93 92 95 86          12/13: Spoke with pharmacist today.  Will push call back.  Patient at goal.

## 2019-01-04 ENCOUNTER — PATIENT OUTREACH (OUTPATIENT)
Dept: OTHER | Facility: OTHER | Age: 84
End: 2019-01-04

## 2019-01-04 NOTE — PROGRESS NOTES
"Last 5 Patient Entered Readings                                      Current 30 Day Average: 128/71     Recent Readings 12/22/2018 12/22/2018 12/22/2018 12/16/2018 12/13/2018    SBP (mmHg) 126 126 160 93 134    DBP (mmHg) 60 83 85 60 67    Pulse 88 83 101 96 102          1/4/2019: Patient states she "got confused" and thought the digital medicine program was over and that is why she has not been taking readings.  Informed patient this is a voluntary program and she can stay in as long as she wants.  Patient agrees and would like to stay in the program.  States she is feeling fine.  No questions or concerns.  "

## 2019-01-10 DIAGNOSIS — E03.9 HYPOTHYROIDISM, UNSPECIFIED TYPE: ICD-10-CM

## 2019-01-10 RX ORDER — LEVOTHYROXINE SODIUM 175 UG/1
175 TABLET ORAL DAILY
Qty: 90 TABLET | Refills: 1 | Status: SHIPPED | OUTPATIENT
Start: 2019-01-10 | End: 2019-07-09 | Stop reason: SDUPTHER

## 2019-01-10 RX ORDER — LEVOTHYROXINE SODIUM 175 UG/1
TABLET ORAL
Qty: 90 TABLET | Refills: 0 | OUTPATIENT
Start: 2019-01-10

## 2019-01-22 ENCOUNTER — PATIENT MESSAGE (OUTPATIENT)
Dept: INTERNAL MEDICINE | Facility: CLINIC | Age: 84
End: 2019-01-22

## 2019-01-22 DIAGNOSIS — G47.00 INSOMNIA, UNSPECIFIED TYPE: ICD-10-CM

## 2019-01-22 RX ORDER — TEMAZEPAM 30 MG/1
CAPSULE ORAL
Qty: 26 CAPSULE | Refills: 0 | Status: SHIPPED | OUTPATIENT
Start: 2019-01-22 | End: 2019-01-23 | Stop reason: SDUPTHER

## 2019-01-23 DIAGNOSIS — G47.00 INSOMNIA, UNSPECIFIED TYPE: ICD-10-CM

## 2019-01-24 ENCOUNTER — TELEPHONE (OUTPATIENT)
Dept: INTERNAL MEDICINE | Facility: CLINIC | Age: 84
End: 2019-01-24

## 2019-01-24 RX ORDER — TEMAZEPAM 15 MG/1
30 CAPSULE ORAL NIGHTLY PRN
Qty: 60 CAPSULE | Refills: 0 | Status: SHIPPED | OUTPATIENT
Start: 2019-01-24 | End: 2019-02-23

## 2019-01-24 RX ORDER — TEMAZEPAM 30 MG/1
CAPSULE ORAL
Qty: 26 CAPSULE | Refills: 0 | Status: SHIPPED | OUTPATIENT
Start: 2019-01-24 | End: 2019-01-24

## 2019-02-12 ENCOUNTER — TELEPHONE (OUTPATIENT)
Dept: INTERNAL MEDICINE | Facility: CLINIC | Age: 84
End: 2019-02-12

## 2019-02-12 RX ORDER — LOSARTAN POTASSIUM 50 MG/1
50 TABLET ORAL DAILY
Qty: 30 TABLET | Refills: 11 | Status: SHIPPED | OUTPATIENT
Start: 2019-02-12 | End: 2019-05-03 | Stop reason: SDUPTHER

## 2019-02-12 NOTE — TELEPHONE ENCOUNTER
----- Message from Aroldo Vázquez sent at 2/12/2019  1:57 PM CST -----  Contact: Charisma with [facility]   Pharmacy is calling to clarify an RX.  RX name:  irbesartan (AVAPRO) 300 MG tablet  What do they need to clarify:  On back order, change to losartan (need answer before the end of the day, will coming in for med pickup)  Comments:

## 2019-02-13 NOTE — TELEPHONE ENCOUNTER
Spoke to pt and advised of medication switch and to keep log of B/P x 1 week. She verbalized understanding.

## 2019-02-20 ENCOUNTER — TELEPHONE (OUTPATIENT)
Dept: INTERNAL MEDICINE | Facility: CLINIC | Age: 84
End: 2019-02-20

## 2019-02-20 DIAGNOSIS — I10 HYPERTENSION, UNSPECIFIED TYPE: Primary | ICD-10-CM

## 2019-02-20 DIAGNOSIS — E03.9 HYPOTHYROIDISM, UNSPECIFIED TYPE: ICD-10-CM

## 2019-02-20 DIAGNOSIS — E78.5 HYPERLIPIDEMIA, UNSPECIFIED HYPERLIPIDEMIA TYPE: ICD-10-CM

## 2019-02-20 NOTE — TELEPHONE ENCOUNTER
----- Message from Aroldo Vázquez sent at 2/20/2019  1:08 PM CST -----  Contact: self [phone]  Doctor appointment and lab have been scheduled.  Please link lab orders to the lab appointment.  Date of doctor appointment:  05/23  Physical or EP:  Physical  Date of lab appointment:  05/16  Comments:

## 2019-02-21 ENCOUNTER — PATIENT OUTREACH (OUTPATIENT)
Dept: OTHER | Facility: OTHER | Age: 84
End: 2019-02-21

## 2019-02-21 NOTE — PROGRESS NOTES
Last 5 Patient Entered Readings                                      Current 30 Day Average: 139/73     Recent Readings 2/21/2019 2/21/2019 2/20/2019 2/20/2019 2/18/2019    SBP (mmHg) 159 162 150 134 128    DBP (mmHg) 76 79 75 73 67    Pulse 93 94 100 97 88          2/21: LVM.  Will call in 1 week.  Patient took BP reading today at 2am.

## 2019-02-21 NOTE — PROGRESS NOTES
"Last 5 Patient Entered Readings                                      Current 30 Day Average: 139/73     Recent Readings 2/21/2019 2/21/2019 2/20/2019 2/20/2019 2/18/2019    SBP (mmHg) 159 162 150 134 128    DBP (mmHg) 76 79 75 73 67    Pulse 93 94 100 97 88          Digital Medicine: Health  Follow Up    Lifestyle Modifications:    1.Dietary Modifications (Sodium intake <2,000mg/day, food labels, dining out): deferred    2.Physical Activity: deferred    3.Medication Therapy: Patient has been compliant with the medication regimen.    4.Patient has the following medication side effects/concerns: Patient states she is taking losartan because irbesartan was on back order.  PCP asked her to take losartan and monitor BP for a week.  Would like to know what medication she should be taking.  Will route note to pharmacist.  (Frequency/Alleviating factors/Precipitating factors, etc.)     Follow up with Ms. Harriet Rutherford completed. No further questions or concerns. Will continue to follow up to achieve health goals.    States she took her BP today at 2am because she could not sleep.  Informed patient that BP will be higher when she wakes up.  States she was also walking around and "aggravated."  "

## 2019-02-21 NOTE — PROGRESS NOTES
Last 5 Patient Entered Readings                                      Current 30 Day Average: 139/73     Recent Readings 2/21/2019 2/21/2019 2/20/2019 2/20/2019 2/18/2019    SBP (mmHg) 159 162 150 134 128    DBP (mmHg) 76 79 75 73 67    Pulse 93 94 100 97 88        HPI:  Called patient to follow up. Patient changed to losartan a week ago because of irbesartan being on back order. Patient endorses adherence to medication regimen. Patient denies hypotensive s/sx (lightheadedness, dizziness, nausea, fatigue); patient denies hypertensive s/sx (SOB, CP, severe headaches, changes in vision). Instructed patient to seek medical care if BP > 180/110 and is accompanied by hypertensive s/sx associated, patient confirms understanding.     Assessment:  Reviewed recent readings. Per 2017 ACC/ AHA HTN guidelines (goal of BP < 130/80), current 30-day average needs to be addressed more thoroughly today. BP trending up due to change in medication.    Plan:  Continue current medication regimen. Will resume irbesartan once the pharmacy has it to dispense. I will continue to monitor regularly and will follow-up in 2 to 3 weeks, sooner if blood pressure begins to trend upward or downward.     Current medication regimen:  Hypertension Medications             amLODIPine (NORVASC) 10 MG tablet Take 1 tablet (10 mg total) by mouth every evening.    hydroCHLOROthiazide (HYDRODIURIL) 25 MG tablet TAKE ONE TABLET BY MOUTH EVERY DAY.    losartan (COZAAR) 50 MG tablet Take 1 tablet (50 mg total) by mouth once daily.        Patient denies having questions or concerns. Patient has my contact information and knows to call with any concerns or clinical changes.

## 2019-03-07 ENCOUNTER — PATIENT OUTREACH (OUTPATIENT)
Dept: OTHER | Facility: OTHER | Age: 84
End: 2019-03-07

## 2019-03-07 NOTE — PROGRESS NOTES
Last 5 Patient Entered Readings                                      Current 30 Day Average: 139/73     Recent Readings 3/4/2019 3/1/2019 2/28/2019 2/28/2019 2/26/2019    SBP (mmHg) 136 145 141 155 153    DBP (mmHg) 68 80 66 86 87    Pulse 90 99 89 100 95        LMFCB to discuss if she's made the change from losartan to irbesartan.

## 2019-03-18 RX ORDER — TEMAZEPAM 30 MG/1
CAPSULE ORAL
Qty: 30 CAPSULE | Refills: 0 | Status: SHIPPED | OUTPATIENT
Start: 2019-03-18 | End: 2019-04-18 | Stop reason: SDUPTHER

## 2019-03-18 NOTE — PROGRESS NOTES
Last 5 Patient Entered Readings                                      Current 30 Day Average: 140/73     Recent Readings 3/17/2019 3/10/2019 3/8/2019 3/7/2019 3/4/2019    SBP (mmHg) 115 144 140 160 136    DBP (mmHg) 68 71 67 77 68    Pulse 86 90 81 86 90      HPI:  Called patient to follow up on her change from losartan to irbesartan. She resumed losartan a month ago over the national shortage of irbesartan. Patient endorses adherence to medication regimen. Patient denies hypotensive s/sx (lightheadedness, dizziness, nausea, fatigue); patient denies hypertensive s/sx (SOB, CP, severe headaches, changes in vision). Instructed patient to seek medical care if BP > 180/110 and is accompanied by hypertensive s/sx associated, patient confirms understanding.     Assessment:  Reviewed recent readings. Per 2017 ACC/ AHA HTN guidelines (goal of BP < 130/80), current 30-day average needs to be addressed more thoroughly today.     Plan:  Continue current medication regimen since her BP was at goal with her most recent reading. I will continue to monitor regularly and will follow-up in 2 to 3 weeks, sooner if blood pressure begins to trend upward or downward.     Current medication regimen:  Hypertension Medications             amLODIPine (NORVASC) 10 MG tablet Take 1 tablet (10 mg total) by mouth every evening.    hydroCHLOROthiazide (HYDRODIURIL) 25 MG tablet TAKE ONE TABLET BY MOUTH EVERY DAY.    losartan (COZAAR) 50 MG tablet Take 1 tablet (50 mg total) by mouth once daily.          Patient denies having questions or concerns. Patient has my contact information and knows to call with any concerns or clinical changes.

## 2019-03-21 NOTE — PROGRESS NOTES
Last 5 Patient Entered Readings                                      Current 30 Day Average: 140/75     Recent Readings 3/17/2019 3/10/2019 3/8/2019 3/7/2019 3/4/2019    SBP (mmHg) 115 144 140 160 136    DBP (mmHg) 68 71 67 77 68    Pulse 86 90 81 86 90          3/21: Spoke with pharmacist this week.  Will call in 1 week.

## 2019-03-29 ENCOUNTER — PATIENT OUTREACH (OUTPATIENT)
Dept: OTHER | Facility: OTHER | Age: 84
End: 2019-03-29

## 2019-03-29 NOTE — PROGRESS NOTES
"Last 5 Patient Entered Readings                                      Current 30 Day Average: 141/74     Recent Readings 3/25/2019 3/24/2019 3/24/2019 3/17/2019 3/10/2019    SBP (mmHg) 135 154 161 115 144    DBP (mmHg) 78 80 76 68 71    Pulse 94 99 92 86 90        Digital Medicine: Health  Follow Up    Lifestyle Modifications:    1.Dietary Modifications (Sodium intake <2,000mg/day, food labels, dining out): deferred    2.Physical Activity: Patient reports she has been "very lazy."  Reports her daughters are trying to get her to go the their gym.  Reports she is looking into it, and will use the machines instead of going to the HIIT classes.  Encouraged patient to join them.    3.Medication Therapy: Patient has been compliant with the medication regimen.    4.Patient has the following medication side effects/concerns: none  (Frequency/Alleviating factors/Precipitating factors, etc.)     Follow up with Mrs. Harriet Rutherford completed. No further questions or concerns. Will continue to follow up to achieve health goals.    "

## 2019-04-18 RX ORDER — TEMAZEPAM 30 MG/1
CAPSULE ORAL
Qty: 30 CAPSULE | Refills: 0 | Status: SHIPPED | OUTPATIENT
Start: 2019-04-18 | End: 2019-05-23 | Stop reason: SDUPTHER

## 2019-05-03 ENCOUNTER — PATIENT OUTREACH (OUTPATIENT)
Dept: OTHER | Facility: OTHER | Age: 84
End: 2019-05-03

## 2019-05-03 DIAGNOSIS — I10 ESSENTIAL HYPERTENSION: Primary | Chronic | ICD-10-CM

## 2019-05-03 RX ORDER — LOSARTAN POTASSIUM 100 MG/1
100 TABLET ORAL DAILY
Qty: 30 TABLET | Refills: 3 | Status: SHIPPED | OUTPATIENT
Start: 2019-05-03 | End: 2019-07-12 | Stop reason: SDUPTHER

## 2019-05-03 NOTE — PROGRESS NOTES
Last 5 Patient Entered Readings                                      Current 30 Day Average: 139/72     Recent Readings 4/29/2019 4/28/2019 4/22/2019 4/17/2019 4/17/2019    SBP (mmHg) 130 146 147 136 109    DBP (mmHg) 81 69 68 60 60    Pulse 85 92 98 95 86      HPI:  Called patient to follow up on her elevated BP. Patient has no reason for her high BP.  Patient endorses adherence to medication regimen.   Patient denies hypotensive s/sx (lightheadedness, dizziness, nausea, fatigue); patient denies hypertensive s/sx (SOB, CP, severe headaches, changes in vision). Instructed patient to seek medical care if BP > 180/110 and is accompanied by hypertensive s/sx associated, patient confirms understanding.     Assessment:  Reviewed recent readings. Per 2017 ACC/ AHA HTN guidelines (goal of BP < 130/80), current 30-day average needs to be addressed more thoroughly today.     Plan:  Increase losartan to 100mg daily from 50mg daily.   I will continue to monitor regularly and will follow-up in 2 to 3 weeks, sooner if blood pressure begins to trend upward or downward.     Current medication regimen:  Hypertension Medications             amLODIPine (NORVASC) 10 MG tablet Take 1 tablet (10 mg total) by mouth every evening.    hydroCHLOROthiazide (HYDRODIURIL) 25 MG tablet TAKE ONE TABLET BY MOUTH EVERY DAY.    losartan (COZAAR) 50 MG tablet Take 1 tablet (50 mg total) by mouth once daily.          Patient denies having questions or concerns. Patient has my contact information and knows to call with any concerns or clinical changes.

## 2019-05-03 NOTE — PROGRESS NOTES
Last 5 Patient Entered Readings                                      Current 30 Day Average: 139/72     Recent Readings 4/29/2019 4/28/2019 4/22/2019 4/17/2019 4/17/2019    SBP (mmHg) 130 146 147 136 109    DBP (mmHg) 81 69 68 60 60    Pulse 85 92 98 95 86        5/3: LVM.  Will call in 4 weeks.

## 2019-05-10 DIAGNOSIS — N32.81 URGENCY-FREQUENCY SYNDROME: ICD-10-CM

## 2019-05-10 DIAGNOSIS — N39.3 STRESS INCONTINENCE: ICD-10-CM

## 2019-05-13 RX ORDER — OXYBUTYNIN CHLORIDE 10 MG/1
TABLET, EXTENDED RELEASE ORAL
Qty: 30 TABLET | Refills: 10 | Status: SHIPPED | OUTPATIENT
Start: 2019-05-13 | End: 2020-05-04

## 2019-05-13 RX ORDER — IMIPRAMINE HYDROCHLORIDE 25 MG/1
TABLET, FILM COATED ORAL
Qty: 90 TABLET | Refills: 10 | Status: SHIPPED | OUTPATIENT
Start: 2019-05-13 | End: 2020-05-12

## 2019-05-16 ENCOUNTER — LAB VISIT (OUTPATIENT)
Dept: LAB | Facility: HOSPITAL | Age: 84
End: 2019-05-16
Payer: COMMERCIAL

## 2019-05-16 DIAGNOSIS — E03.9 HYPOTHYROIDISM, UNSPECIFIED TYPE: ICD-10-CM

## 2019-05-16 DIAGNOSIS — E78.5 HYPERLIPIDEMIA, UNSPECIFIED HYPERLIPIDEMIA TYPE: ICD-10-CM

## 2019-05-16 DIAGNOSIS — I10 HYPERTENSION, UNSPECIFIED TYPE: ICD-10-CM

## 2019-05-16 LAB
ALBUMIN SERPL BCP-MCNC: 3.8 G/DL (ref 3.5–5.2)
ALP SERPL-CCNC: 141 U/L (ref 55–135)
ALT SERPL W/O P-5'-P-CCNC: 24 U/L (ref 10–44)
ANION GAP SERPL CALC-SCNC: 11 MMOL/L (ref 8–16)
AST SERPL-CCNC: 18 U/L (ref 10–40)
BASOPHILS # BLD AUTO: 0.04 K/UL (ref 0–0.2)
BASOPHILS NFR BLD: 0.5 % (ref 0–1.9)
BILIRUB SERPL-MCNC: 0.3 MG/DL (ref 0.1–1)
BUN SERPL-MCNC: 21 MG/DL (ref 8–23)
CALCIUM SERPL-MCNC: 9.5 MG/DL (ref 8.7–10.5)
CHLORIDE SERPL-SCNC: 104 MMOL/L (ref 95–110)
CHOLEST SERPL-MCNC: 166 MG/DL (ref 120–199)
CHOLEST/HDLC SERPL: 3.6 {RATIO} (ref 2–5)
CO2 SERPL-SCNC: 27 MMOL/L (ref 23–29)
CREAT SERPL-MCNC: 0.9 MG/DL (ref 0.5–1.4)
DIFFERENTIAL METHOD: NORMAL
EOSINOPHIL # BLD AUTO: 0.3 K/UL (ref 0–0.5)
EOSINOPHIL NFR BLD: 4.4 % (ref 0–8)
ERYTHROCYTE [DISTWIDTH] IN BLOOD BY AUTOMATED COUNT: 12.2 % (ref 11.5–14.5)
EST. GFR  (AFRICAN AMERICAN): >60 ML/MIN/1.73 M^2
EST. GFR  (NON AFRICAN AMERICAN): >60 ML/MIN/1.73 M^2
GLUCOSE SERPL-MCNC: 92 MG/DL (ref 70–110)
HCT VFR BLD AUTO: 41.3 % (ref 37–48.5)
HDLC SERPL-MCNC: 46 MG/DL (ref 40–75)
HDLC SERPL: 27.7 % (ref 20–50)
HGB BLD-MCNC: 13.4 G/DL (ref 12–16)
IMM GRANULOCYTES # BLD AUTO: 0.03 K/UL (ref 0–0.04)
IMM GRANULOCYTES NFR BLD AUTO: 0.4 % (ref 0–0.5)
LDLC SERPL CALC-MCNC: 69.2 MG/DL (ref 63–159)
LYMPHOCYTES # BLD AUTO: 1.9 K/UL (ref 1–4.8)
LYMPHOCYTES NFR BLD: 25.9 % (ref 18–48)
MCH RBC QN AUTO: 30.6 PG (ref 27–31)
MCHC RBC AUTO-ENTMCNC: 32.4 G/DL (ref 32–36)
MCV RBC AUTO: 94 FL (ref 82–98)
MONOCYTES # BLD AUTO: 0.9 K/UL (ref 0.3–1)
MONOCYTES NFR BLD: 12.7 % (ref 4–15)
NEUTROPHILS # BLD AUTO: 4.2 K/UL (ref 1.8–7.7)
NEUTROPHILS NFR BLD: 56.1 % (ref 38–73)
NONHDLC SERPL-MCNC: 120 MG/DL
NRBC BLD-RTO: 0 /100 WBC
PLATELET # BLD AUTO: 295 K/UL (ref 150–350)
PMV BLD AUTO: 9.8 FL (ref 9.2–12.9)
POTASSIUM SERPL-SCNC: 3.7 MMOL/L (ref 3.5–5.1)
PROT SERPL-MCNC: 6.7 G/DL (ref 6–8.4)
RBC # BLD AUTO: 4.38 M/UL (ref 4–5.4)
SODIUM SERPL-SCNC: 142 MMOL/L (ref 136–145)
T4 FREE SERPL-MCNC: 1.25 NG/DL (ref 0.71–1.51)
TRIGL SERPL-MCNC: 254 MG/DL (ref 30–150)
TSH SERPL DL<=0.005 MIU/L-ACNC: 0.2 UIU/ML (ref 0.4–4)
WBC # BLD AUTO: 7.42 K/UL (ref 3.9–12.7)

## 2019-05-16 PROCEDURE — 36415 COLL VENOUS BLD VENIPUNCTURE: CPT

## 2019-05-16 PROCEDURE — 85025 COMPLETE CBC W/AUTO DIFF WBC: CPT

## 2019-05-16 PROCEDURE — 80053 COMPREHEN METABOLIC PANEL: CPT

## 2019-05-16 PROCEDURE — 84439 ASSAY OF FREE THYROXINE: CPT

## 2019-05-16 PROCEDURE — 84443 ASSAY THYROID STIM HORMONE: CPT

## 2019-05-16 PROCEDURE — 80061 LIPID PANEL: CPT

## 2019-05-17 ENCOUNTER — PATIENT OUTREACH (OUTPATIENT)
Dept: OTHER | Facility: OTHER | Age: 84
End: 2019-05-17

## 2019-05-17 NOTE — PROGRESS NOTES
Last 5 Patient Entered Readings                                      Current 30 Day Average: 137/70     Recent Readings 5/12/2019 5/12/2019 5/11/2019 5/7/2019 5/5/2019    SBP (mmHg) 142 149 117 134 137    DBP (mmHg) 75 69 63 74 68    Pulse 91 103 92 89 80        HPI:  Called patient to follow up since increasing losartan to 100mg daily. Patient is feeling well with this change.  Patient endorses adherence to medication regimen.   Patient denies hypotensive s/sx (lightheadedness, dizziness, nausea, fatigue); patient denies hypertensive s/sx (SOB, CP, severe headaches, changes in vision). Instructed patient to seek medical care if BP > 180/110 and is accompanied by hypertensive s/sx associated, patient confirms understanding.     Assessment:  Reviewed recent readings. Per 2017 ACC/ AHA HTN guidelines (goal of BP < 130/80), current 30-day average needs to be addressed more thoroughly today.     Plan:  Continue current medication regimen.   I will continue to monitor regularly and will follow-up in 2 to 3 weeks, sooner if blood pressure begins to trend upward or downward.     Current medication regimen:  Hypertension Medications             amLODIPine (NORVASC) 10 MG tablet Take 1 tablet (10 mg total) by mouth every evening.    hydroCHLOROthiazide (HYDRODIURIL) 25 MG tablet TAKE ONE TABLET BY MOUTH EVERY DAY.    losartan (COZAAR) 100 MG tablet Take 1 tablet (100 mg total) by mouth once daily.          Patient denies having questions or concerns. Patient has my contact information and knows to call with any concerns or clinical changes.

## 2019-05-24 ENCOUNTER — TELEPHONE (OUTPATIENT)
Dept: INTERNAL MEDICINE | Facility: CLINIC | Age: 84
End: 2019-05-24

## 2019-05-24 RX ORDER — TEMAZEPAM 30 MG/1
CAPSULE ORAL
Qty: 30 CAPSULE | Refills: 0 | Status: SHIPPED | OUTPATIENT
Start: 2019-05-24 | End: 2019-06-25 | Stop reason: SDUPTHER

## 2019-05-31 ENCOUNTER — TELEPHONE (OUTPATIENT)
Dept: INTERNAL MEDICINE | Facility: CLINIC | Age: 84
End: 2019-05-31

## 2019-05-31 DIAGNOSIS — E03.9 HYPOTHYROIDISM, UNSPECIFIED TYPE: ICD-10-CM

## 2019-05-31 DIAGNOSIS — I10 HYPERTENSION, UNSPECIFIED TYPE: ICD-10-CM

## 2019-05-31 DIAGNOSIS — E78.5 HYPERLIPIDEMIA, UNSPECIFIED HYPERLIPIDEMIA TYPE: ICD-10-CM

## 2019-05-31 DIAGNOSIS — Z00.00 ANNUAL PHYSICAL EXAM: Primary | ICD-10-CM

## 2019-05-31 NOTE — TELEPHONE ENCOUNTER
----- Message from Tiffanie Smith sent at 5/31/2019  4:00 PM CDT -----  Contact: Self Call [phone]  Doctor appointment and lab have been scheduled.  Please link lab orders to the lab appointment.  Date of doctor appointment:  7/12  Date of lab appointment:  5/16  Physical or EP: Epp  Comments:

## 2019-05-31 NOTE — PROGRESS NOTES
Last 5 Patient Entered Readings                                      Current 30 Day Average: 136/71     Recent Readings 5/26/2019 5/24/2019 5/23/2019 5/22/2019 5/12/2019    SBP (mmHg) 128 144 152 126 142    DBP (mmHg) 74 72 73 62 75    Pulse 85 83 95 87 91          Digital Medicine: Health  Follow Up    Lifestyle Modifications:    1.Dietary Modifications (Sodium intake <2,000mg/day, food labels, dining out): deferred    2.Physical Activity: Reports no change in PA.  States she checked out her daughter's gym and did not like, so she will go to [facility] [facility] and try out the water aerobics classes.  Will set [facility] next encounter.    3.Medication Therapy: Patient has been compliant with the medication regimen.    4.Patient has the following medication side effects/concerns: none  (Frequency/Alleviating factors/Precipitating factors, etc.)     Follow up with Mrs. Harriet Rutherford completed. No further questions or concerns. Will continue to follow up to achieve health goals.

## 2019-06-09 DIAGNOSIS — I15.2 HYPERTENSION DUE TO ENDOCRINE DISORDER: Chronic | ICD-10-CM

## 2019-06-16 RX ORDER — AMLODIPINE BESYLATE 10 MG/1
TABLET ORAL
Qty: 90 TABLET | Refills: 2 | OUTPATIENT
Start: 2019-06-16

## 2019-06-25 RX ORDER — TEMAZEPAM 30 MG/1
CAPSULE ORAL
Qty: 30 CAPSULE | Refills: 0 | Status: SHIPPED | OUTPATIENT
Start: 2019-06-25 | End: 2019-07-24 | Stop reason: SDUPTHER

## 2019-06-28 NOTE — PROGRESS NOTES
Subjective:       Patient ID: Harriet Rutherford is a 83 y.o. female.    Chief Complaint: Annual Exam    Patient is a 83 y.o.female who presents today for annual    Chronic medical issues:  Hypothyroidism:  currently on 175 mcg synthroid     Depression: she is taking   lexapro 20 and wellbutrin 150 mg. Feels much better.      HTN: stable; no chest pain or SOB     HLD: Stable on statin     Stress incontinence: Follows with Dr. Kumar       She was told that she snores and apneic episodes.      Health Maintenance     Labs: reviewed  Breast Cancer: due now  Cervical Cancer: Pap Smear s/p JOSSUE   Colorectal Cancer: Colonoscopy 2016, due 2021  DEXA: 2018  Review of Systems   Constitutional: Negative for appetite change, chills, diaphoresis and fever.   HENT: Negative for congestion, ear discharge, ear pain, postnasal drip, tinnitus, trouble swallowing and voice change.    Eyes: Negative for discharge, redness and itching.   Respiratory: Negative for cough, chest tightness, shortness of breath and wheezing.    Cardiovascular: Negative for chest pain, palpitations and leg swelling.   Gastrointestinal: Negative for abdominal pain, constipation, diarrhea, nausea and vomiting.   Endocrine: Negative for cold intolerance and heat intolerance.   Genitourinary: Negative for difficulty urinating, flank pain, hematuria and urgency.   Musculoskeletal: Negative for arthralgias, gait problem, myalgias and neck stiffness.   Skin: Negative for color change and rash.   Neurological: Negative for dizziness, seizures, syncope and headaches.   Hematological: Negative for adenopathy.   Psychiatric/Behavioral: Negative for agitation, behavioral problems, confusion and sleep disturbance.       Objective:      Physical Exam   Constitutional: She is oriented to person, place, and time. She appears well-developed and well-nourished. No distress.   HENT:   Head: Normocephalic and atraumatic.   Right Ear: External ear normal.   Left Ear: External ear normal.    Nose: Nose normal.   Mouth/Throat: Oropharynx is clear and moist. No oropharyngeal exudate.   Eyes: Pupils are equal, round, and reactive to light. Conjunctivae and EOM are normal. Right eye exhibits no discharge. Left eye exhibits no discharge. No scleral icterus.   Neck: Neck supple. No JVD present. No tracheal deviation present. No thyromegaly present.   Cardiovascular: Normal rate, normal heart sounds and intact distal pulses. Exam reveals no gallop and no friction rub.   No murmur heard.  Pulmonary/Chest: Effort normal and breath sounds normal. No stridor. No respiratory distress. She has no wheezes. She has no rales. She exhibits no tenderness.   Abdominal: Soft. Bowel sounds are normal. She exhibits no distension. There is no tenderness. There is no rebound.   Musculoskeletal: She exhibits no edema or tenderness.   Lymphadenopathy:     She has no cervical adenopathy.   Neurological: She is alert and oriented to person, place, and time.   Skin: Skin is warm and dry. No rash noted. She is not diaphoretic. No erythema.   Psychiatric: She has a normal mood and affect. Her behavior is normal.   Nursing note and vitals reviewed.      Assessment and Plan:       1. Annual physical exam  - labs reviewed  - mammogram due now    2. Osteoporosis, unspecified osteoporosis type, unspecified pathological fracture presence  - declined tx in the past; taking calcium and vitamin D    3. NAFLD (nonalcoholic fatty liver disease)  - low fat diet; lfts are in normal range    4. Hypothyroidism, unspecified type  - decrease synthroid to 150 mg daily; lab in 2 months  - TSH; Future    5. Adrenal adenoma, unspecified laterality  - CT Abdomen Pelvis  Without Contrast; Future    6. Hyperlipidemia, unspecified hyperlipidemia type  - LDL stable but triglycerides are high; continue statin and add fenofibrate; labs in 2 months  - Comprehensive metabolic panel; Future  - Lipid panel; Future    7. Hypertension, unspecified type  - stable on  meds    8. PAD (peripheral artery disease)  - on statin    9. Visit for screening mammogram    - Mammo Digital Screening Bilat without CA; Future    10. Snoring    - Ambulatory referral to Sleep Disorders    11. Apnea    - Ambulatory referral to Sleep Disorders    12. Essential hypertension  - on amlodipine 1/2 bid  - losartan (COZAAR) 100 MG tablet; Take 1 tablet (100 mg total) by mouth once daily.  Dispense: 30 tablet; Refill: 11          No follow-ups on file.

## 2019-07-02 ENCOUNTER — PATIENT OUTREACH (OUTPATIENT)
Dept: OTHER | Facility: OTHER | Age: 84
End: 2019-07-02

## 2019-07-02 NOTE — PROGRESS NOTES
"Last 5 Patient Entered Readings                                      Current 30 Day Average: 132/70     Recent Readings 6/30/2019 6/28/2019 6/27/2019 6/25/2019 6/24/2019    SBP (mmHg) 138 109 141 139 154    DBP (mmHg) 73 59 76 71 69    Pulse 99 87 99 80 83          Digital Medicine: Health  Follow Up    Lifestyle Modifications:    1.Dietary Modifications (Sodium intake <2,000mg/day, food labels, dining out): Reports she has been trying to keep up with drinking water, but feels she could work on increasing this.  Encouraged patient to try to drink about 100oz based on her weight.    2.Physical Activity: Reports she did not go to the water aerobics class, but will continue to look into it.    3.Medication Therapy: Patient has been compliant with the medication regimen.    4.Patient has the following medication side effects/concerns: States she has had a couple of "fainty" feelings this week.  Encouraged increase in water intake, but will route note to pharmD.  (Frequency/Alleviating factors/Precipitating factors, etc.)     Follow up with . Harriet WONG Krish completed. No further questions or concerns. Will continue to follow up to achieve health goals.  "

## 2019-07-08 ENCOUNTER — PATIENT OUTREACH (OUTPATIENT)
Dept: OTHER | Facility: OTHER | Age: 84
End: 2019-07-08

## 2019-07-08 NOTE — PROGRESS NOTES
"Last 5 Patient Entered Readings                                      Current 30 Day Average: 129/70     Recent Readings 7/8/2019 7/6/2019 7/5/2019 7/4/2019 7/2/2019    SBP (mmHg) 113 122 128 138 112    DBP (mmHg) 56 72 75 66 70    Pulse 83 91 101 87 90      HPI:  Called patient to follow up on her "fainting symptoms". She received a new bag of prescriptions and noticed she was taking irbesartan and losartan together. She noticed this last week and she stopped irbesartan on Sunday 7/7. Her symptoms have improved since stopping irbesartan.  Patient endorses adherence to medication regimen.   Patient denies hypotensive s/sx (lightheadedness, dizziness, nausea, fatigue); patient denies hypertensive s/sx (SOB, CP, severe headaches, changes in vision). Instructed patient to seek medical care if BP > 180/110 and is accompanied by hypertensive s/sx associated, patient confirms understanding.     Assessment:  Reviewed recent readings. Per 2017 ACC/ AHA HTN guidelines (goal of BP < 130/80), current 30-day average is well controlled.     Plan:  Continue current medication regimen with your medications below.   I will continue to monitor regularly and will follow-up in 4 weeks, sooner if blood pressure begins to trend upward or downward.     Current medication regimen:  Hypertension Medications             amLODIPine (NORVASC) 10 MG tablet Take 1 tablet (10 mg total) by mouth every evening.    hydroCHLOROthiazide (HYDRODIURIL) 25 MG tablet TAKE ONE TABLET BY MOUTH EVERY DAY.    losartan (COZAAR) 100 MG tablet Take 1 tablet (100 mg total) by mouth once daily.          Patient denies having questions or concerns. Patient has my contact information and knows to call with any concerns or clinical changes.        "

## 2019-07-09 DIAGNOSIS — E78.5 HYPERLIPIDEMIA, UNSPECIFIED HYPERLIPIDEMIA TYPE: Chronic | ICD-10-CM

## 2019-07-09 DIAGNOSIS — E03.9 HYPOTHYROIDISM, UNSPECIFIED TYPE: ICD-10-CM

## 2019-07-09 RX ORDER — LEVOTHYROXINE SODIUM 175 UG/1
TABLET ORAL
Qty: 90 TABLET | Refills: 3 | Status: SHIPPED | OUTPATIENT
Start: 2019-07-09 | End: 2019-07-12

## 2019-07-09 RX ORDER — BUPROPION HYDROCHLORIDE 150 MG/1
TABLET ORAL
Qty: 90 TABLET | Refills: 3 | Status: SHIPPED | OUTPATIENT
Start: 2019-07-09 | End: 2020-05-04

## 2019-07-12 ENCOUNTER — OFFICE VISIT (OUTPATIENT)
Dept: INTERNAL MEDICINE | Facility: CLINIC | Age: 84
End: 2019-07-12
Payer: COMMERCIAL

## 2019-07-12 VITALS
TEMPERATURE: 98 F | SYSTOLIC BLOOD PRESSURE: 136 MMHG | WEIGHT: 206.81 LBS | BODY MASS INDEX: 33.24 KG/M2 | HEART RATE: 106 BPM | RESPIRATION RATE: 16 BRPM | HEIGHT: 66 IN | DIASTOLIC BLOOD PRESSURE: 72 MMHG

## 2019-07-12 DIAGNOSIS — M81.0 OSTEOPOROSIS, UNSPECIFIED OSTEOPOROSIS TYPE, UNSPECIFIED PATHOLOGICAL FRACTURE PRESENCE: ICD-10-CM

## 2019-07-12 DIAGNOSIS — E03.9 HYPOTHYROIDISM, UNSPECIFIED TYPE: Chronic | ICD-10-CM

## 2019-07-12 DIAGNOSIS — K76.0 NAFLD (NONALCOHOLIC FATTY LIVER DISEASE): ICD-10-CM

## 2019-07-12 DIAGNOSIS — Z00.00 ANNUAL PHYSICAL EXAM: Primary | ICD-10-CM

## 2019-07-12 DIAGNOSIS — I10 HYPERTENSION, UNSPECIFIED TYPE: Chronic | ICD-10-CM

## 2019-07-12 DIAGNOSIS — E78.5 HYPERLIPIDEMIA, UNSPECIFIED HYPERLIPIDEMIA TYPE: Chronic | ICD-10-CM

## 2019-07-12 DIAGNOSIS — R06.83 SNORING: ICD-10-CM

## 2019-07-12 DIAGNOSIS — I73.9 PAD (PERIPHERAL ARTERY DISEASE): ICD-10-CM

## 2019-07-12 DIAGNOSIS — Z12.31 VISIT FOR SCREENING MAMMOGRAM: ICD-10-CM

## 2019-07-12 DIAGNOSIS — D35.00 ADRENAL ADENOMA, UNSPECIFIED LATERALITY: ICD-10-CM

## 2019-07-12 DIAGNOSIS — R06.81 APNEA: ICD-10-CM

## 2019-07-12 DIAGNOSIS — I10 ESSENTIAL HYPERTENSION: Chronic | ICD-10-CM

## 2019-07-12 PROCEDURE — 3078F DIAST BP <80 MM HG: CPT | Mod: CPTII,S$GLB,, | Performed by: INTERNAL MEDICINE

## 2019-07-12 PROCEDURE — 3075F SYST BP GE 130 - 139MM HG: CPT | Mod: CPTII,S$GLB,, | Performed by: INTERNAL MEDICINE

## 2019-07-12 PROCEDURE — 3078F PR MOST RECENT DIASTOLIC BLOOD PRESSURE < 80 MM HG: ICD-10-PCS | Mod: CPTII,S$GLB,, | Performed by: INTERNAL MEDICINE

## 2019-07-12 PROCEDURE — 90633 HEPA VACC PED/ADOL 2 DOSE IM: CPT | Mod: S$GLB,,, | Performed by: INTERNAL MEDICINE

## 2019-07-12 PROCEDURE — 99999 PR PBB SHADOW E&M-EST. PATIENT-LVL IV: ICD-10-PCS | Mod: PBBFAC,,, | Performed by: INTERNAL MEDICINE

## 2019-07-12 PROCEDURE — 90472 HEPATITIS A VACCINE PEDIATRIC / ADOLESCENT 2 DOSE IM: ICD-10-PCS | Mod: S$GLB,,, | Performed by: INTERNAL MEDICINE

## 2019-07-12 PROCEDURE — 90471 IMMUNIZATION ADMIN: CPT | Mod: S$GLB,,, | Performed by: INTERNAL MEDICINE

## 2019-07-12 PROCEDURE — 90662 IIV NO PRSV INCREASED AG IM: CPT | Mod: S$GLB,,, | Performed by: INTERNAL MEDICINE

## 2019-07-12 PROCEDURE — 99397 PR PREVENTIVE VISIT,EST,65 & OVER: ICD-10-PCS | Mod: 25,S$GLB,, | Performed by: INTERNAL MEDICINE

## 2019-07-12 PROCEDURE — 99999 PR PBB SHADOW E&M-EST. PATIENT-LVL IV: CPT | Mod: PBBFAC,,, | Performed by: INTERNAL MEDICINE

## 2019-07-12 PROCEDURE — 90633 HEPATITIS A VACCINE PEDIATRIC / ADOLESCENT 2 DOSE IM: ICD-10-PCS | Mod: S$GLB,,, | Performed by: INTERNAL MEDICINE

## 2019-07-12 PROCEDURE — 90662 FLU VACCINE - HIGH DOSE (65+) PRESERVATIVE FREE IM: ICD-10-PCS | Mod: S$GLB,,, | Performed by: INTERNAL MEDICINE

## 2019-07-12 PROCEDURE — 90472 IMMUNIZATION ADMIN EACH ADD: CPT | Mod: S$GLB,,, | Performed by: INTERNAL MEDICINE

## 2019-07-12 PROCEDURE — 3075F PR MOST RECENT SYSTOLIC BLOOD PRESS GE 130-139MM HG: ICD-10-PCS | Mod: CPTII,S$GLB,, | Performed by: INTERNAL MEDICINE

## 2019-07-12 PROCEDURE — 99397 PER PM REEVAL EST PAT 65+ YR: CPT | Mod: 25,S$GLB,, | Performed by: INTERNAL MEDICINE

## 2019-07-12 PROCEDURE — 90471 FLU VACCINE - HIGH DOSE (65+) PRESERVATIVE FREE IM: ICD-10-PCS | Mod: S$GLB,,, | Performed by: INTERNAL MEDICINE

## 2019-07-12 RX ORDER — FENOFIBRATE 160 MG/1
160 TABLET ORAL DAILY
Qty: 90 TABLET | Refills: 3 | Status: SHIPPED | OUTPATIENT
Start: 2019-07-12 | End: 2020-07-03

## 2019-07-12 RX ORDER — ESCITALOPRAM OXALATE 20 MG/1
20 TABLET ORAL DAILY
Qty: 30 TABLET | Refills: 11 | Status: SHIPPED | OUTPATIENT
Start: 2019-07-12 | End: 2020-07-19

## 2019-07-12 RX ORDER — ATORVASTATIN CALCIUM 40 MG/1
TABLET, FILM COATED ORAL
Qty: 90 TABLET | Refills: 2 | OUTPATIENT
Start: 2019-07-12

## 2019-07-12 RX ORDER — LEVOTHYROXINE SODIUM 150 UG/1
150 TABLET ORAL DAILY
Qty: 30 TABLET | Refills: 11 | Status: SHIPPED | OUTPATIENT
Start: 2019-07-12 | End: 2019-09-12

## 2019-07-12 RX ORDER — LOSARTAN POTASSIUM 100 MG/1
100 TABLET ORAL DAILY
Qty: 30 TABLET | Refills: 11 | Status: SHIPPED | OUTPATIENT
Start: 2019-07-12 | End: 2020-01-06 | Stop reason: SDUPTHER

## 2019-07-15 ENCOUNTER — HOSPITAL ENCOUNTER (OUTPATIENT)
Dept: RADIOLOGY | Facility: HOSPITAL | Age: 84
Discharge: HOME | End: 2019-07-15
Attending: INTERNAL MEDICINE
Payer: COMMERCIAL

## 2019-07-15 ENCOUNTER — OFFICE VISIT (OUTPATIENT)
Dept: SLEEP MEDICINE | Facility: CLINIC | Age: 84
End: 2019-07-15
Payer: COMMERCIAL

## 2019-07-15 VITALS
BODY MASS INDEX: 32.85 KG/M2 | HEIGHT: 66 IN | DIASTOLIC BLOOD PRESSURE: 68 MMHG | SYSTOLIC BLOOD PRESSURE: 121 MMHG | WEIGHT: 204.38 LBS | HEART RATE: 91 BPM

## 2019-07-15 DIAGNOSIS — G47.30 SLEEP APNEA, UNSPECIFIED TYPE: Primary | ICD-10-CM

## 2019-07-15 DIAGNOSIS — Z12.31 VISIT FOR SCREENING MAMMOGRAM: ICD-10-CM

## 2019-07-15 PROCEDURE — 99999 PR PBB SHADOW E&M-EST. PATIENT-LVL III: CPT | Mod: PBBFAC,,, | Performed by: PSYCHIATRY & NEUROLOGY

## 2019-07-15 PROCEDURE — 3078F PR MOST RECENT DIASTOLIC BLOOD PRESSURE < 80 MM HG: ICD-10-PCS | Mod: CPTII,S$GLB,, | Performed by: PSYCHIATRY & NEUROLOGY

## 2019-07-15 PROCEDURE — 99204 OFFICE O/P NEW MOD 45 MIN: CPT | Mod: S$GLB,,, | Performed by: PSYCHIATRY & NEUROLOGY

## 2019-07-15 PROCEDURE — 3074F PR MOST RECENT SYSTOLIC BLOOD PRESSURE < 130 MM HG: ICD-10-PCS | Mod: CPTII,S$GLB,, | Performed by: PSYCHIATRY & NEUROLOGY

## 2019-07-15 PROCEDURE — 77063 BREAST TOMOSYNTHESIS BI: CPT | Mod: 26,,, | Performed by: RADIOLOGY

## 2019-07-15 PROCEDURE — 3074F SYST BP LT 130 MM HG: CPT | Mod: CPTII,S$GLB,, | Performed by: PSYCHIATRY & NEUROLOGY

## 2019-07-15 PROCEDURE — 1101F PT FALLS ASSESS-DOCD LE1/YR: CPT | Mod: CPTII,S$GLB,, | Performed by: PSYCHIATRY & NEUROLOGY

## 2019-07-15 PROCEDURE — 77063 MAMMO DIGITAL SCREENING BILAT WITH TOMOSYNTHESIS_CAD: ICD-10-PCS | Mod: 26,,, | Performed by: RADIOLOGY

## 2019-07-15 PROCEDURE — 99999 PR PBB SHADOW E&M-EST. PATIENT-LVL III: ICD-10-PCS | Mod: PBBFAC,,, | Performed by: PSYCHIATRY & NEUROLOGY

## 2019-07-15 PROCEDURE — 3078F DIAST BP <80 MM HG: CPT | Mod: CPTII,S$GLB,, | Performed by: PSYCHIATRY & NEUROLOGY

## 2019-07-15 PROCEDURE — 77067 SCR MAMMO BI INCL CAD: CPT | Mod: TC,PO

## 2019-07-15 PROCEDURE — 77067 MAMMO DIGITAL SCREENING BILAT WITH TOMOSYNTHESIS_CAD: ICD-10-PCS | Mod: 26,,, | Performed by: RADIOLOGY

## 2019-07-15 PROCEDURE — 99204 PR OFFICE/OUTPT VISIT, NEW, LEVL IV, 45-59 MIN: ICD-10-PCS | Mod: S$GLB,,, | Performed by: PSYCHIATRY & NEUROLOGY

## 2019-07-15 PROCEDURE — 77067 SCR MAMMO BI INCL CAD: CPT | Mod: 26,,, | Performed by: RADIOLOGY

## 2019-07-15 PROCEDURE — 1101F PR PT FALLS ASSESS DOC 0-1 FALLS W/OUT INJ PAST YR: ICD-10-PCS | Mod: CPTII,S$GLB,, | Performed by: PSYCHIATRY & NEUROLOGY

## 2019-07-15 NOTE — PROGRESS NOTES
Harriet Rutherford  was seen at the request of  Arpita Guevara DO for sleep evaluation.    07/15/2019 INITIAL HISTORY OF PRESENT ILLNESS:  Harriet Rutherford is a 83 y.o. female is here to be evaluated for a sleep disorder.       CHIEF COMPLAINT:      The patient's complaints include excessive daytime sleepiness, excessive daytime fatigue, snoring,  witnessed breathing pauses,  gasping for air in sleep since  6 months ago.    Harriet Rutherford denied  interrupted sleep.    Denied recent weight gain (she actually lost weight)      EPWORTH SLEEPINESS SCALE 7/15/2019   Sitting and reading 0   Watching TV 1   Sitting, inactive in a public place (e.g. a theatre or a meeting) 0   As a passenger in a car for an hour without a break 3   Lying down to rest in the afternoon when circumstances permit 3   Sitting and talking to someone 0   Sitting quietly after a lunch without alcohol 1   In a car, while stopped for a few minutes in traffic 0   Total score 8       PHQ9 7/15/2019   Little interest or pleasure in doing things: Several days   Feeling down, depressed or hopeless: Not at all   Trouble falling asleep, staying asleep, or sleeping too much: Several days   Feeling tired or having little energy: Nearly every day   Poor appetite or overeating: Not at all   Feeling bad about yourself- or that you are a failure or have let yourself or family down Not at all   Trouble concentrating on things, such as reading the newspaper or watching television: Several days   Moving or speaking so slowly that other people could have noticed. Or the opposite- being so fidgety or restless that you have been moving around a lot more than usual: Not at all   Thoughts that you would be better off dead or hurting yourself in some way: Not at all   If you indicated you have experienced any of the aforementioned problems, how difficult have these problems made it for you to do your work, take care of things at home or get along with other people? Somewhat difficult    Total Score 6     GAD7 7/15/2019   Feeling nervous, anxious, on edge More than half the days   Not being able to stop or control worrying Not at all   Worrying too much about different things Not at all   Trouble relaxing Several days   Being so restless that its hard to sit still Not at all   Becoming easily annoyed or irritable Not at all   Feeling afraid as if something awful might happen Not at all   If you marked you are experiencing any of the aforementioned problems, how difficult have these made it for you to do your work, take care of things at home, or get along with other people? Not difficult at all   EYAL-7 Score 3         SLEEP ROUTINE AND LIFESTYLE 07/15/2019 :  Sleep Clinic New Patient 7/15/2019   What time do you go to bed on a week day? (Give a range) 12a   What time do you go to bed on a day off? (Give a range) 12a   How long does it take you to fall asleep? (Give a range) 1-2   On average, how many times per night do you wake up? 0   How long does it take you to fall back into sleep? (Give a range) 0   What time do you wake up to start your day on a week day? (Give a range) 10:30a   What time do you wake up to start your day on a day off? (Give a range) 11:30a   What time do you get out of bed? (Give a range) immediately   On average, how many hours do you sleep? 10   On average, how many naps do you take per day? 1   Rate your sleep quality from 0 to 5 (0-poor, 5-great). 3   Have you experienced:  Weight loss?   How much weight have you lost or gained (in lbs.) in the last year? 15lb   On average, how many times per night do you go to the bathroom?  0   Have you ever had a sleep study/CPAP machine/surgery for sleep apnea? No   Have you ever had a CPAP machine for sleep apnea? No   Have you ever had surgery for sleep apnea? No       Sleep Clinic ROS  7/15/2019   Difficulty breathing through the nose?  Sometimes   Sore throat or dry mouth in the morning? Yes   Irregular or very fast heart beat?   Yes   Shortness of breath?  Yes   Acid reflux? Yes   Body aches and pains?  Yes   Morning headaches? No   Dizziness? Yes   Mood changes?  No   Do you exercise?  No   Do you feel like moving your legs a lot?  No          Denies symptoms concerning for parasomnia      The patient had tonsillectomy in the past; + allergic to outdoors allergens    Taking Restoril 30 mg for sleep initiation (states otherwise would be awake till 3 PM)    SHE IS EVENING TYPE SINCE CHILDHOOD.    Previously too Trazodone 100 mg - did not work and Ambien 10 mg - stopped working.    Social History:      Occupation: retried. Used to work 2-10 PM  Bed partner: sometimes kids watch her sleep  Exercise routine: no; in the morning she would get up as soon as she wakes up. She would quilt  Caffeine:  Coffee   In the morning.  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    Vitreous detachment of both eyes 07/08/2013    Hypertension 09/19/2013    ALMENDAREZ (dyspnea on exertion) 10/21/2013    Hyperlipidemia 11/25/2013    Hypokalemia 03/26/2014    Hallux valgus of right foot 11/04/2015    Elevated alkaline phosphatase level 02/21/2016    Elevated serum GGT level 02/21/2016    Chronic constipation 02/21/2016    History of colon polyps 02/21/2016    Adrenal adenoma 05/10/2016    Elevated liver enzymes 06/03/2016    NAFLD (nonalcoholic fatty liver disease) 12/21/2016    PAD (peripheral artery disease)     Leg swelling 03/31/2017    Left posterior capsular opacification 06/19/2017    Pseudophakia 06/19/2017    Post-operative state 07/10/2017     Resolved Ambulatory Problems     Diagnosis Date Noted    Smoker     Tobacco abuse 10/21/2013    Hepatic steatosis 10/19/2016      Past Medical History:   Diagnosis Date    Breast cyst     Cataract     Chronic back pain     Hepatic steatosis 10/19/2016    Hyperlipidemia LDL goal <130     Hypertension     Hypothyroidism     OP (osteoporosis)     Smoker     Stress incontinence     Urinary tract infection                 PAST SURGICAL HISTORY:    Past Surgical History:   Procedure Laterality Date    ARTHROPLASTY-TOE Right 11/4/2015    Performed [facility] at [facility] 1ST FLR    BIOPSY LIVER AND ULTRASOUND N/A 6/3/2016    Performed by [facility] at [facility] OR 2ND FLR    BLADDER SURGERY      lifted     CAPSULOTOMY-JOINT Right 11/4/2015    Performed by [facility] at [facility] OR 1ST FLR    CATARACT EXTRACTION      Both eyes    COLONOSCOPY N/A 5/19/2016    Performed by Wander Mahajan MD at [facility] ENDO (4TH FLR)    CYSTOSCOPY      FOOT SURGERY Right 11-4-15    OSTEOTOMY-METATARSAL    HYSTERECTOMY      OOPHORECTOMY      OSTEOTOMY-METATARSAL opening wedge proximal osteotomy Right 11/4/2015    Performed by [facility] at [facility] OR 1ST FLR    REPAIR-HAMMER TOE 2nd toe Right 11/4/2015    Performed by [facility] at [facility] OR 1ST FLR    TENOTOMY Right 11/4/2015    Performed by [facility] at [facility] OR 1ST FLR         FAMILY HISTORY:                Family History   Problem Relation Age of Onset    Cataracts Mother     Cataracts Father     Hypertension Father     Stroke Father     Heart attack Father     Heart disease Father     Heart failure Father     Cataracts Sister     Cancer Brother     Cataracts Brother     Cataracts Maternal Grandmother     Heart attack Paternal Grandfather     Heart disease Paternal Grandfather     Heart failure Paternal Grandfather     Cataracts Sister     Breast cancer Daughter     Breast cancer Daughter     Amblyopia Neg Hx     Blindness Neg Hx     Glaucoma Neg Hx     Macular degeneration Neg Hx     Strabismus Neg Hx      Retinal detachment Neg Hx        SOCIAL HISTORY:          Tobacco:   Social History     Tobacco Use   Smoking Status Former Smoker    Packs/day: 1.00    Years: 60.00    Pack years: 60.00    Types: Cigarettes    Last attempt to quit: 2016    Years since quittin.9   Smokeless Tobacco Never Used       alcohol use:    Social History     Substance and Sexual Activity   Alcohol Use Yes    Alcohol/week: 0.0 oz    Types: 1 - 2 Shots of liquor per week    Comment: RARELY                   ALLERGIES:    Review of patient's allergies indicates:   Allergen Reactions    Lisinopril Other (See Comments)     cough    Sulfur      Other reaction(s): Urticaria    Sulfa (sulfonamide antibiotics) Rash       CURRENT MEDICATIONS:    Current Outpatient Medications   Medication Sig Dispense Refill    amLODIPine (NORVASC) 10 MG tablet Take 1 tablet (10 mg total) by mouth every evening. 90 tablet 3    aspirin (ECOTRIN) 81 MG EC tablet Take 81 mg by mouth once daily.      atorvastatin (LIPITOR) 40 MG tablet Take 1 tablet (40 mg total) by mouth once daily. 90 tablet 3    buPROPion (WELLBUTRIN XL) 150 MG TB24 tablet TAKE ONE TABLET BY MOUTH EVERY DAY. 90 tablet 3    CA CITRATE/MGOX/VIT D3/B6/MIN (CITRACAL PLUS ORAL) Take 1,200 mg by mouth once daily.      escitalopram oxalate (LEXAPRO) 20 MG tablet Take 1 tablet (20 mg total) by mouth once daily. 30 tablet 11    fenofibrate 160 MG Tab Take 1 tablet (160 mg total) by mouth once daily. 90 tablet 3    hydroCHLOROthiazide (HYDRODIURIL) 25 MG tablet TAKE ONE TABLET BY MOUTH EVERY DAY. 30 tablet 10    hydrOXYzine HCl (ATARAX) 25 MG tablet Take 1 tablet (25 mg total) by mouth 3 (three) times daily as needed for Itching. 60 tablet 3    imipramine (TOFRANIL) 25 MG tablet TAKE 1 TABLET BY MOUTH THREE TIMES A DAY 90 tablet 10    levothyroxine (SYNTHROID) 150 MCG tablet Take 1 tablet (150 mcg total) by mouth once daily. 30 tablet 11    losartan (COZAAR) 100 MG tablet Take 1  "tablet (100 mg total) by mouth once daily. 30 tablet 11    multivitamin capsule Take 1 capsule by mouth once daily.      oxybutynin (DITROPAN-XL) 10 MG 24 hr tablet TAKE ONE TABLET BY MOUTH EVERY DAY. 30 tablet 10    temazepam (RESTORIL) 30 mg capsule TAKE ONE CAPSULE BY MOUTH EVERY NIGHT AT BEDTIME 30 capsule 0     No current facility-administered medications for this visit.                       REVIEW OF SYSTEMS:   Sleep related symptoms as per HPI      PHYSICAL EXAM:  /68 (BP Location: Left arm, Patient Position: Sitting, BP Method: Large (Automatic))   Pulse 91   Ht 5' 6" (1.676 m)   Wt 92.7 kg (204 lb 6.4 oz)   BMI 32.99 kg/m²   GENERAL: Normal development, well groomed.  HEENT:   HEENT:  Conjunctivae are non-erythematous; Pupils equal, round, and reactive to light; Nose is symmetrical; Nasal mucosa is pink and moist; Septum is midline; Inferior turbinates are normal; Nasal airflow is normal; Posterior pharynx is pink; Modified Mallampati:IV; Posterior palate is low; Tonsils s/p UPPP; Uvula is normal and pink;Tongue is enlarged; Dentition is fair; No TMJ tenderness; Jaw opening and protrusion without click and without discomfort.  NECK: Supple. Neck circumference is 17 inches. No thyromegaly. No palpable nodes.     SKIN: On face and neck: No abrasions, no rashes, no lesions.  No subcutaneous nodules are palpable.  RESPIRATORY: Chest is clear to auscultation.  Normal chest expansion and non-labored breathing at rest.  CARDIOVASCULAR: Normal S1, S2.  No murmurs, gallops or rubs. No carotid bruits bilaterally.  No edema. No clubbing. No cyanosis.    NEURO: Oriented to time, place and person. Normal attention span and concentration. Gait normal.    PSYCH: Affect is full. Mood is normal  MUSCULOSKELETAL: Moves 4 extremities. Gait normal.         Using [facility]: no      ASSESSMENT:    1. Sleep Apnea NEC. The patient symptomatically has  excessive daytime sleepiness, snoring,  witnessed breathing " "pauses, excessive daytime fatigue, gasping for air in sleep, interrupted sleep and nocturia  with exam findings of "a crowded oral airway and elevated body mass index. The patient has medical co-morbidities of hyperlipidemia,  which can be worsened by PIA. This warrants further investigation for possible obstructive sleep apnea.    2. Delayed Sleep Phase Disorder      PLAN:    Diagnostic: HST (BCBS) + for delayed sleep phase.        More than 15 minutes of this 30 minutes visit was spent in counseling: during our discussion today, we talked about the etiology of  PIA as well as the potential ramifications of untreated sleep apnea, which could include daytime sleepiness, hypertension, heart disease and/or stroke.  We discussed potential treatment options, which could include weight loss, body positioning, continuous positive airway pressure (CPAP), or referral for surgical consideration. Meanwhile, she  is urged to avoid supine sleep, weight gain and alcoholic beverages since all of these can worsen PIA.     Precautions: The patient was advised to abstain from driving should he feel sleepy or drowsy.    Follow up: MD only after the sleep study    Thank you for allowing me the opportunity to participate in the care of your patient.    This visit summary will be sent to referring provider via inbasket      "

## 2019-07-15 NOTE — LETTER
July 15, 2019      Arpita Guevara, DO  [address] [address]           [facility]  [address] [address]  Phone: [phone]  Fax: [phone]          Patient: Harriet Rutherford   MR Number: [id number]   YOB: 1935   Date of Visit: 7/15/2019       Dear Dr. Arpita Guevara:    Thank you for referring Harriet Rutherford to me for evaluation. Attached you will find relevant portions of my assessment and plan of care.    If you have questions, please do not hesitate to call me. I look forward to following Harriet Rutherford along with you.    Sincerely,    Belen Stark MD    Enclosure  CC:  No Recipients    If you would like to receive this communication electronically, please contact [email or web address] or [phone] to request more information on [employer] access.    For providers and/or their staff who would like to refer a patient to [facility], please contact us through our one-stop-shop provider referral line, [facility], at [phone].    If you feel you have received this communication in error or would no longer like to receive these types of communications, please e-mail [email or web address]

## 2019-07-15 NOTE — PATIENT INSTRUCTIONS
"SLEEP LAB (Dara or [facility]) will contact you to schedulethe sleep study. Their number is [phone] (ext 2). Please call them if you do not hear from them in 2 weeks from now.  The [facility] Lab is located on 7th floor of the [facility]; [facility] is located in [facility].    SLEEP CLINIC (my assistant) will call you when the sleep study results are ready - if you have not heard from us by 2 weeks from the date of the study, please call [phone] (ext 1) or you can use [facility] to contact me.    You are advised to abstain from driving should you feel sleepy or drowsy.    ____________________________________  " Advanced Sleep Phase Disorder"    Melatonin 1-2 mg - at 6-7 PM - set up phone alarm for that  Gardening on awakening  Avoid tablet (ipad) 2 hrs before bed, or set up Night Shift akak Blue Light Filter (settings->brighness->Nightshift -> 6PM to 6 AM ->the warmest)  __________________________________    "

## 2019-07-17 ENCOUNTER — HOSPITAL ENCOUNTER (OUTPATIENT)
Dept: RADIOLOGY | Facility: HOSPITAL | Age: 84
Discharge: HOME | End: 2019-07-17
Attending: INTERNAL MEDICINE
Payer: COMMERCIAL

## 2019-07-17 DIAGNOSIS — D35.00 ADRENAL ADENOMA, UNSPECIFIED LATERALITY: ICD-10-CM

## 2019-07-17 PROCEDURE — 74178 CT ABD&PLV WO CNTR FLWD CNTR: CPT | Mod: TC

## 2019-07-17 PROCEDURE — 74178 CT ABDOMEN PELVIS W WO CONTRAST: ICD-10-PCS | Mod: 26,,, | Performed by: RADIOLOGY

## 2019-07-17 PROCEDURE — 25500020 PHARM REV CODE 255: Performed by: INTERNAL MEDICINE

## 2019-07-17 PROCEDURE — 74178 CT ABD&PLV WO CNTR FLWD CNTR: CPT | Mod: 26,,, | Performed by: RADIOLOGY

## 2019-07-17 RX ADMIN — IOHEXOL 100 ML: 350 INJECTION, SOLUTION INTRAVENOUS at 05:07

## 2019-07-18 ENCOUNTER — TELEPHONE (OUTPATIENT)
Dept: SLEEP MEDICINE | Facility: OTHER | Age: 84
End: 2019-07-18

## 2019-07-18 LAB
CREAT SERPL-MCNC: 1.1 MG/DL (ref 0.5–1.4)
SAMPLE: NORMAL

## 2019-07-24 DIAGNOSIS — I15.2 HYPERTENSION DUE TO ENDOCRINE DISORDER: Chronic | ICD-10-CM

## 2019-07-24 RX ORDER — TEMAZEPAM 30 MG/1
CAPSULE ORAL
Qty: 30 CAPSULE | Refills: 0 | Status: SHIPPED | OUTPATIENT
Start: 2019-07-24 | End: 2019-08-23 | Stop reason: SDUPTHER

## 2019-07-26 RX ORDER — AMLODIPINE BESYLATE 10 MG/1
TABLET ORAL
Qty: 90 TABLET | Refills: 2 | Status: SHIPPED | OUTPATIENT
Start: 2019-07-26 | End: 2020-03-05

## 2019-08-06 ENCOUNTER — PATIENT OUTREACH (OUTPATIENT)
Dept: OTHER | Facility: OTHER | Age: 84
End: 2019-08-06

## 2019-08-06 NOTE — PROGRESS NOTES
Last 5 Patient Entered Readings                                      Current 30 Day Average: 126/73     Recent Readings 8/3/2019 8/3/2019 8/3/2019 7/31/2019 7/31/2019    SBP (mmHg) 100 160 169 102 162    DBP (mmHg) 58 66 83 65 86    Pulse 87 85 86 84 89        Patient expressed concern about higher readings.  Reviewed technique.    Digital Medicine: Health  Follow Up    Lifestyle Modifications:    1.Dietary Modifications (Sodium intake <2,000mg/day, food labels, dining out): Reports she is up to 4.5 bottles of water per day.  States she was at 3 bottles per day.  Encouraged patient to slowly increase water intake over time.    2.Physical Activity: Reports she has not made it to [facility] yet.  States she needs to contact insurance.  Will follow up about [facility] Referral next encounter.     3.Medication Therapy: Patient has been compliant with the medication regimen.    4.Patient has the following medication side effects/concerns: none  (Frequency/Alleviating factors/Precipitating factors, etc.)     Follow up with . Harriet MARVIN Rutherford completed. No further questions or concerns. Will continue to follow up to achieve health goals.

## 2019-08-12 ENCOUNTER — TELEPHONE (OUTPATIENT)
Dept: SLEEP MEDICINE | Facility: OTHER | Age: 84
End: 2019-08-12

## 2019-08-13 ENCOUNTER — HOSPITAL ENCOUNTER (OUTPATIENT)
Dept: SLEEP MEDICINE | Facility: OTHER | Age: 84
Discharge: HOME | End: 2019-08-13
Attending: PSYCHIATRY & NEUROLOGY
Payer: COMMERCIAL

## 2019-08-13 DIAGNOSIS — G47.30 SLEEP APNEA, UNSPECIFIED TYPE: ICD-10-CM

## 2019-08-13 DIAGNOSIS — G47.33 OSA (OBSTRUCTIVE SLEEP APNEA): ICD-10-CM

## 2019-08-13 PROCEDURE — 95800 SLP STDY UNATTENDED: CPT

## 2019-08-23 RX ORDER — TEMAZEPAM 30 MG/1
CAPSULE ORAL
Qty: 30 CAPSULE | Refills: 0 | Status: SHIPPED | OUTPATIENT
Start: 2019-08-23 | End: 2019-09-23 | Stop reason: SDUPTHER

## 2019-08-26 PROCEDURE — 95800 PR SLEEP STUDY, UNATTENDED, RECORD HEART RATE/O2 SAT/RESP ANAL/SLEEP TIME: ICD-10-PCS | Mod: 26,,, | Performed by: PSYCHIATRY & NEUROLOGY

## 2019-08-26 PROCEDURE — 95800 SLP STDY UNATTENDED: CPT | Mod: 26,,, | Performed by: PSYCHIATRY & NEUROLOGY

## 2019-08-27 ENCOUNTER — TELEPHONE (OUTPATIENT)
Dept: SLEEP MEDICINE | Facility: CLINIC | Age: 84
End: 2019-08-27

## 2019-08-28 NOTE — TELEPHONE ENCOUNTER
Please schedule for the follow up with Me / NP  to review sleep study results.  Positive for signifiant sleep apnea.    Thanks!

## 2019-09-04 ENCOUNTER — PATIENT OUTREACH (OUTPATIENT)
Dept: OTHER | Facility: OTHER | Age: 84
End: 2019-09-04

## 2019-09-04 NOTE — PROGRESS NOTES
Last 5 Patient Entered Readings                                      Current 30 Day Average: 134/71     Recent Readings 9/3/2019 9/1/2019 8/28/2019 8/27/2019 8/27/2019    SBP (mmHg) 141 128 140 150 113    DBP (mmHg) 77 68 68 80 57    Pulse 88 86 85 87 77            Digital Medicine: Health  Follow Up    Left voicemail to follow up with . Harriet Rutherford.  Current BP average 134/71 mmHg is at goal, 140/90 mmHg.    Will call in 3 weeks.  Patient at goal.

## 2019-09-07 ENCOUNTER — PATIENT MESSAGE (OUTPATIENT)
Dept: ADMINISTRATIVE | Facility: OTHER | Age: 84
End: 2019-09-07

## 2019-09-12 ENCOUNTER — LAB VISIT (OUTPATIENT)
Dept: LAB | Facility: HOSPITAL | Age: 84
End: 2019-09-12
Attending: INTERNAL MEDICINE
Payer: COMMERCIAL

## 2019-09-12 ENCOUNTER — PATIENT MESSAGE (OUTPATIENT)
Dept: INTERNAL MEDICINE | Facility: CLINIC | Age: 84
End: 2019-09-12

## 2019-09-12 ENCOUNTER — TELEPHONE (OUTPATIENT)
Dept: INTERNAL MEDICINE | Facility: CLINIC | Age: 84
End: 2019-09-12

## 2019-09-12 DIAGNOSIS — E03.9 HYPOTHYROIDISM, UNSPECIFIED TYPE: Chronic | ICD-10-CM

## 2019-09-12 DIAGNOSIS — E78.5 HYPERLIPIDEMIA, UNSPECIFIED HYPERLIPIDEMIA TYPE: Chronic | ICD-10-CM

## 2019-09-12 DIAGNOSIS — E03.9 HYPOTHYROIDISM, UNSPECIFIED TYPE: Primary | ICD-10-CM

## 2019-09-12 LAB
ALBUMIN SERPL BCP-MCNC: 3.9 G/DL (ref 3.5–5.2)
ALP SERPL-CCNC: 83 U/L (ref 55–135)
ALT SERPL W/O P-5'-P-CCNC: 39 U/L (ref 10–44)
ANION GAP SERPL CALC-SCNC: 9 MMOL/L (ref 8–16)
AST SERPL-CCNC: 28 U/L (ref 10–40)
BILIRUB SERPL-MCNC: 0.3 MG/DL (ref 0.1–1)
BUN SERPL-MCNC: 20 MG/DL (ref 8–23)
CALCIUM SERPL-MCNC: 9.8 MG/DL (ref 8.7–10.5)
CHLORIDE SERPL-SCNC: 103 MMOL/L (ref 95–110)
CHOLEST SERPL-MCNC: 215 MG/DL (ref 120–199)
CHOLEST/HDLC SERPL: 3.6 {RATIO} (ref 2–5)
CO2 SERPL-SCNC: 27 MMOL/L (ref 23–29)
CREAT SERPL-MCNC: 1.2 MG/DL (ref 0.5–1.4)
EST. GFR  (AFRICAN AMERICAN): 48.6 ML/MIN/1.73 M^2
EST. GFR  (NON AFRICAN AMERICAN): 42.2 ML/MIN/1.73 M^2
GLUCOSE SERPL-MCNC: 98 MG/DL (ref 70–110)
HDLC SERPL-MCNC: 59 MG/DL (ref 40–75)
HDLC SERPL: 27.4 % (ref 20–50)
LDLC SERPL CALC-MCNC: 126.2 MG/DL (ref 63–159)
NONHDLC SERPL-MCNC: 156 MG/DL
POTASSIUM SERPL-SCNC: 3.7 MMOL/L (ref 3.5–5.1)
PROT SERPL-MCNC: 6.7 G/DL (ref 6–8.4)
SODIUM SERPL-SCNC: 139 MMOL/L (ref 136–145)
T4 FREE SERPL-MCNC: 0.75 NG/DL (ref 0.71–1.51)
TRIGL SERPL-MCNC: 149 MG/DL (ref 30–150)
TSH SERPL DL<=0.005 MIU/L-ACNC: 20.99 UIU/ML (ref 0.4–4)

## 2019-09-12 PROCEDURE — 80061 LIPID PANEL: CPT

## 2019-09-12 PROCEDURE — 36415 COLL VENOUS BLD VENIPUNCTURE: CPT | Mod: PO

## 2019-09-12 PROCEDURE — 80053 COMPREHEN METABOLIC PANEL: CPT

## 2019-09-12 PROCEDURE — 84443 ASSAY THYROID STIM HORMONE: CPT

## 2019-09-12 PROCEDURE — 84439 ASSAY OF FREE THYROXINE: CPT

## 2019-09-12 RX ORDER — LEVOTHYROXINE SODIUM 175 UG/1
175 TABLET ORAL DAILY
Qty: 30 TABLET | Refills: 11 | Status: SHIPPED | OUTPATIENT
Start: 2019-09-12 | End: 2020-07-21 | Stop reason: SDUPTHER

## 2019-09-12 NOTE — TELEPHONE ENCOUNTER
Notify pt that triglycerides improved with the fenofibrate but her thyroid did not do well with the lower dose of synthroid; we need to increase her dosage again to 175 mg per day and lab in two months

## 2019-09-13 DIAGNOSIS — E78.5 HYPERLIPIDEMIA, UNSPECIFIED HYPERLIPIDEMIA TYPE: Chronic | ICD-10-CM

## 2019-09-13 RX ORDER — ATORVASTATIN CALCIUM 40 MG/1
TABLET, FILM COATED ORAL
Qty: 90 TABLET | Refills: 2 | OUTPATIENT
Start: 2019-09-13

## 2019-09-23 RX ORDER — TEMAZEPAM 30 MG/1
CAPSULE ORAL
Qty: 30 CAPSULE | Refills: 0 | Status: SHIPPED | OUTPATIENT
Start: 2019-09-23 | End: 2019-10-01 | Stop reason: SDUPTHER

## 2019-09-24 ENCOUNTER — OFFICE VISIT (OUTPATIENT)
Dept: SLEEP MEDICINE | Facility: CLINIC | Age: 84
End: 2019-09-24
Payer: COMMERCIAL

## 2019-09-24 VITALS
DIASTOLIC BLOOD PRESSURE: 71 MMHG | HEART RATE: 89 BPM | HEIGHT: 66 IN | SYSTOLIC BLOOD PRESSURE: 124 MMHG | WEIGHT: 203.25 LBS | BODY MASS INDEX: 32.66 KG/M2

## 2019-09-24 DIAGNOSIS — G47.33 OSA (OBSTRUCTIVE SLEEP APNEA): Primary | ICD-10-CM

## 2019-09-24 PROCEDURE — 3074F SYST BP LT 130 MM HG: CPT | Mod: CPTII,S$GLB,, | Performed by: PSYCHIATRY & NEUROLOGY

## 2019-09-24 PROCEDURE — 1101F PR PT FALLS ASSESS DOC 0-1 FALLS W/OUT INJ PAST YR: ICD-10-PCS | Mod: CPTII,S$GLB,, | Performed by: PSYCHIATRY & NEUROLOGY

## 2019-09-24 PROCEDURE — 3078F DIAST BP <80 MM HG: CPT | Mod: CPTII,S$GLB,, | Performed by: PSYCHIATRY & NEUROLOGY

## 2019-09-24 PROCEDURE — 99999 PR PBB SHADOW E&M-EST. PATIENT-LVL III: CPT | Mod: PBBFAC,,, | Performed by: PSYCHIATRY & NEUROLOGY

## 2019-09-24 PROCEDURE — 1101F PT FALLS ASSESS-DOCD LE1/YR: CPT | Mod: CPTII,S$GLB,, | Performed by: PSYCHIATRY & NEUROLOGY

## 2019-09-24 PROCEDURE — 99999 PR PBB SHADOW E&M-EST. PATIENT-LVL III: ICD-10-PCS | Mod: PBBFAC,,, | Performed by: PSYCHIATRY & NEUROLOGY

## 2019-09-24 PROCEDURE — 99214 PR OFFICE/OUTPT VISIT, EST, LEVL IV, 30-39 MIN: ICD-10-PCS | Mod: S$GLB,,, | Performed by: PSYCHIATRY & NEUROLOGY

## 2019-09-24 PROCEDURE — 3074F PR MOST RECENT SYSTOLIC BLOOD PRESSURE < 130 MM HG: ICD-10-PCS | Mod: CPTII,S$GLB,, | Performed by: PSYCHIATRY & NEUROLOGY

## 2019-09-24 PROCEDURE — 3078F PR MOST RECENT DIASTOLIC BLOOD PRESSURE < 80 MM HG: ICD-10-PCS | Mod: CPTII,S$GLB,, | Performed by: PSYCHIATRY & NEUROLOGY

## 2019-09-24 PROCEDURE — 99214 OFFICE O/P EST MOD 30 MIN: CPT | Mod: S$GLB,,, | Performed by: PSYCHIATRY & NEUROLOGY

## 2019-09-24 NOTE — PROGRESS NOTES
Harriet Rutherford  was seen at the request of  No ref. provider found for sleep evaluation.    07/15/2019 INITIAL HISTORY OF PRESENT ILLNESS:  Harriet Rutherford is a 84 y.o. female is here to be evaluated for a sleep disorder.       CHIEF COMPLAINT:      The patient's complaints include excessive daytime sleepiness, excessive daytime fatigue, snoring,  witnessed breathing pauses,  gasping for air in sleep since  6 months ago.    Harriet Rutherford denied  interrupted sleep.    Denied recent weight gain (she actually lost weight)      EPWORTH SLEEPINESS SCALE 7/15/2019   Sitting and reading 0   Watching TV 1   Sitting, inactive in a public place (e.g. a theatre or a meeting) 0   As a passenger in a car for an hour without a break 3   Lying down to rest in the afternoon when circumstances permit 3   Sitting and talking to someone 0   Sitting quietly after a lunch without alcohol 1   In a car, while stopped for a few minutes in traffic 0   Total score 8       PHQ9 9/23/2019   Little interest or pleasure in doing things: Not at all   Feeling down, depressed or hopeless: Not at all   Trouble falling asleep, staying asleep, or sleeping too much: Nearly every day   Feeling tired or having little energy: Nearly every day   Poor appetite or overeating: Not at all   Feeling bad about yourself- or that you are a failure or have let yourself or family down Not at all   Trouble concentrating on things, such as reading the newspaper or watching television: Not at all   Moving or speaking so slowly that other people could have noticed. Or the opposite- being so fidgety or restless that you have been moving around a lot more than usual: Not at all   Thoughts that you would be better off dead or hurting yourself in some way: Not at all   If you indicated you have experienced any of the aforementioned problems, how difficult have these problems made it for you to do your work, take care of things at home or get along with other people? Somewhat difficult    Total Score 6     GAD7 9/23/2019   Feeling nervous, anxious, on edge Not at all   Not being able to stop or control worrying Not at all   Worrying too much about different things Not at all   Trouble relaxing Several days   Being so restless that its hard to sit still Not at all   Becoming easily annoyed or irritable Not at all   Feeling afraid as if something awful might happen Not at all   If you marked you are experiencing any of the aforementioned problems, how difficult have these made it for you to do your work, take care of things at home, or get along with other people? Somewhat difficult   EYAL-7 Score 1         SLEEP ROUTINE AND LIFESTYLE 09/24/2019 :  Sleep Clinic New Patient 7/15/2019   What time do you go to bed on a week day? (Give a range) 12a   What time do you go to bed on a day off? (Give a range) 12a   How long does it take you to fall asleep? (Give a range) 1-2   On average, how many times per night do you wake up? 0   How long does it take you to fall back into sleep? (Give a range) 0   What time do you wake up to start your day on a week day? (Give a range) 10:30a   What time do you wake up to start your day on a day off? (Give a range) 11:30a   What time do you get out of bed? (Give a range) immediately   On average, how many hours do you sleep? 10   On average, how many naps do you take per day? 1   Rate your sleep quality from 0 to 5 (0-poor, 5-great). 3   Have you experienced:  Weight loss?   How much weight have you lost or gained (in lbs.) in the last year? 15lb   On average, how many times per night do you go to the bathroom?  0   Have you ever had a sleep study/CPAP machine/surgery for sleep apnea? No   Have you ever had a CPAP machine for sleep apnea? No   Have you ever had surgery for sleep apnea? No       Sleep Clinic ROS  7/15/2019   Difficulty breathing through the nose?  Sometimes   Sore throat or dry mouth in the morning? Yes   Irregular or very fast heart beat?  Yes    Shortness of breath?  Yes   Acid reflux? Yes   Body aches and pains?  Yes   Morning headaches? No   Dizziness? Yes   Mood changes?  No   Do you exercise?  No   Do you feel like moving your legs a lot?  No          Denies symptoms concerning for parasomnia      The patient had tonsillectomy in the past; + allergic to outdoors allergens    Taking Restoril 30 mg for sleep initiation (states otherwise would be awake till 3 PM)    SHE IS EVENING TYPE SINCE CHILDHOOD.    Previously too Trazodone 100 mg - did not work and Ambien 10 mg - stopped working.      INTERVAL HISTORY:    09/24/2019  The patient has not presented any new complaints since the previous visit. Comes to discuss HST.  RDI 39; SAO2 min 77%. Treatment options for PIA  were discussed.  Otherwisie improved latency from 2 hrs down to 1 hr. + Melatonin bedtime, still taking Restoril 30 mg, no more ipad after 7 PM, walks in AM>  Denied interrupted sleep. Still tired during the day.  ESS 6/24.      Social History:      Occupation: retried. Used to work 2-10 PM  Bed partner: sometimes kids watch her sleep  Exercise routine: no; in the morning she would get up as soon as she wakes up. She would quilt  Caffeine:  Coffee   In the morning.  No other source of caffeine  Electronics: tablet at night     PREVIOUS SLEEP STUDIES:     HST [id number]: Significant Obstructive sleep apnea (PIA) with AHI (apnea hypopnea Index) of 27 (RDI 39) and SaO2 of 77.5% (weight  204 lbs).        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    Vitreous detachment of both eyes 07/08/2013    Hypertension 09/19/2013    ALMENDAREZ (dyspnea on exertion) 10/21/2013    Hyperlipidemia 11/25/2013    Hypokalemia 03/26/2014    Hallux valgus of right foot 11/04/2015    Elevated alkaline phosphatase level 02/21/2016    Elevated  serum GGT level 02/21/2016    Chronic constipation 02/21/2016    History of colon polyps 02/21/2016    Adrenal adenoma 05/10/2016    Elevated liver enzymes 06/03/2016    NAFLD (nonalcoholic fatty liver disease) 12/21/2016    PAD (peripheral artery disease)     Leg swelling 03/31/2017    Left posterior capsular opacification 06/19/2017    Pseudophakia 06/19/2017    Post-operative state 07/10/2017    PIA (obstructive sleep apnea)      Resolved Ambulatory Problems     Diagnosis Date Noted    Smoker     Tobacco abuse 10/21/2013    Hepatic steatosis 10/19/2016     Past Medical History:   Diagnosis Date    Breast cyst     Cataract     Hyperlipidemia LDL goal <130     Urinary tract infection                 PAST SURGICAL HISTORY:    Past Surgical History:   Procedure Laterality Date    BLADDER SURGERY      lifted     CATARACT EXTRACTION      Both eyes    COLONOSCOPY N/A 5/19/2016    Procedure: COLONOSCOPY;  Surgeon: Wander Mahajan MD;  Location: [facility] ([address] [place] [address]);  Service: Endoscopy;  Laterality: N/A;  Per Dr. Mahajan use Prepopik     CYSTOSCOPY      FOOT SURGERY Right 11-4-15    OSTEOTOMY-METATARSAL    HYSTERECTOMY      OOPHORECTOMY           FAMILY HISTORY:                Family History   Problem Relation Age of Onset    Cataracts Mother     Cataracts Father     Hypertension Father     Stroke Father     Heart attack Father     Heart disease Father     Heart failure Father     Cataracts Sister     Cancer Brother     Cataracts Brother     Cataracts Maternal Grandmother     Heart attack Paternal Grandfather     Heart disease Paternal Grandfather     Heart failure Paternal Grandfather     Cataracts Sister     Breast cancer Daughter     Breast cancer Daughter     Amblyopia Neg Hx     Blindness Neg Hx     Glaucoma Neg Hx     Macular degeneration Neg Hx     Strabismus Neg Hx     Retinal detachment Neg Hx        SOCIAL HISTORY:          Tobacco:   Social History     Tobacco Use    Smoking Status Former Smoker    Packs/day: 1.00    Years: 60.00    Pack years: 60.00    Types: Cigarettes    Last attempt to quit: 7/19/2016    Years since quitting: 3.1   Smokeless Tobacco Never Used       alcohol use:    Social History     Substance and Sexual Activity   Alcohol Use Yes    Alcohol/week: 0.0 standard drinks    Types: 1 - 2 Shots of liquor per week    Comment: RARELY                   ALLERGIES:    Review of patient's allergies indicates:   Allergen Reactions    Lisinopril Other (See Comments)     cough    Sulfur      Other reaction(s): Urticaria    Sulfa (sulfonamide antibiotics) Rash       CURRENT MEDICATIONS:    Current Outpatient Medications   Medication Sig Dispense Refill    amLODIPine (NORVASC) 10 MG tablet TAKE ONE TABLET BY MOUTH EVERY DAY. 90 tablet 2    aspirin (ECOTRIN) 81 MG EC tablet Take 81 mg by mouth once daily.      atorvastatin (LIPITOR) 40 MG tablet Take 1 tablet (40 mg total) by mouth once daily. 90 tablet 3    buPROPion (WELLBUTRIN XL) 150 MG TB24 tablet TAKE ONE TABLET BY MOUTH EVERY DAY. 90 tablet 3    CA CITRATE/MGOX/VIT D3/B6/MIN (CITRACAL PLUS ORAL) Take 1,200 mg by mouth once daily.      escitalopram oxalate (LEXAPRO) 20 MG tablet Take 1 tablet (20 mg total) by mouth once daily. 30 tablet 11    fenofibrate 160 MG Tab Take 1 tablet (160 mg total) by mouth once daily. 90 tablet 3    hydroCHLOROthiazide (HYDRODIURIL) 25 MG tablet TAKE ONE TABLET BY MOUTH EVERY DAY. 30 tablet 10    hydrOXYzine HCl (ATARAX) 25 MG tablet Take 1 tablet (25 mg total) by mouth 3 (three) times daily as needed for Itching. 60 tablet 3    imipramine (TOFRANIL) 25 MG tablet TAKE 1 TABLET BY MOUTH THREE TIMES A DAY 90 tablet 10    levothyroxine (SYNTHROID, LEVOTHROID) 175 MCG tablet Take 1 tablet (175 mcg total) by mouth once daily. 30 tablet 11    losartan (COZAAR) 100 MG tablet Take 1 tablet (100 mg total) by mouth once daily. 30 tablet 11    multivitamin capsule Take 1  "capsule by mouth once daily.      oxybutynin (DITROPAN-XL) 10 MG 24 hr tablet TAKE ONE TABLET BY MOUTH EVERY DAY. 30 tablet 10    temazepam (RESTORIL) 30 mg capsule TAKE ONE CAPSULE BY MOUTH AT BEDTIME 30 capsule 0     No current facility-administered medications for this visit.                       REVIEW OF SYSTEMS:   Sleep related symptoms as per HPI      PHYSICAL EXAM:  /71 (BP Location: Right arm, Patient Position: Sitting, BP Method: Medium (Automatic))   Pulse 89   Ht 5' 6" (1.676 m)   Wt 92.2 kg (203 lb 4.2 oz)   BMI 32.81 kg/m²   GENERAL: Normal development, well groomed.  HEENT:   HEENT:  Conjunctivae are non-erythematous; Pupils equal, round, and reactive to light; Nose is symmetrical; Nasal mucosa is pink and moist; Septum is midline; Inferior turbinates are normal; Nasal airflow is normal; Posterior pharynx is pink; Modified Mallampati:IV; Posterior palate is low; Tonsils s/p UPPP; Uvula is normal and pink;Tongue is enlarged; Dentition is fair; No TMJ tenderness; Jaw opening and protrusion without click and without discomfort.  NECK: Supple. Neck circumference is 17 inches. No thyromegaly. No palpable nodes.     SKIN: On face and neck: No abrasions, no rashes, no lesions.  No subcutaneous nodules are palpable.  RESPIRATORY: Chest is clear to auscultation.  Normal chest expansion and non-labored breathing at rest.  CARDIOVASCULAR: Normal S1, S2.  No murmurs, gallops or rubs. No carotid bruits bilaterally.  No edema. No clubbing. No cyanosis.    NEURO: Oriented to time, place and person. Normal attention span and concentration. Gait normal.    PSYCH: Affect is full. Mood is normal  MUSCULOSKELETAL: Moves 4 extremities. Gait normal.         Using [facility]: no      ASSESSMENT:    1. PIA. The patient symptomatically has  excessive daytime sleepiness, snoring,  witnessed breathing pauses, excessive daytime fatigue, gasping for air in sleep, interrupted sleep and nocturia  with exam findings of " ""a crowded oral airway and elevated body mass index. The patient has medical co-morbidities of hyperlipidemia,  which can be worsened by PIA. This warrants further investigation for possible obstructive sleep apnea.    2. Delayed Sleep Phase Disorder  3. Insomnia - improving.      PLAN:        Treatment: prescription  for APAP 5-19 cm with the mask of a patient's choice was given to the patient.    Education: During our discussion today, we talked about the etiology of obstructive sleep apnea as well as the potential ramifications of untreated sleep apnea, which could include daytime sleepiness, hypertension, heart disease and/or stroke.      We discussed potential treatment options, which could include weight loss, body positioning, continuous positive airway pressure (CPAP), or referral for surgical consideration. The patient preferred CPAP option.     Discussed purpose of PAP therapy, health benefits of CPAP, as well as the potential ramifications of untreated sleep apnea, which could include daytime sleepiness, hypertension, heart disease and/or stroke. An AHI of 15 is associated with increased risk CVD.     The patient should avoid ETOH and sedatives at night, as it tends to aggravate PIA. Regular replacement of CPAP mask, tubing and filter was recommended.    Precautions: The patient was advised to abstain from driving should he feel sleepy or drowsy.    Follow up: MD/NP after 1 month of PAP use.    Thank you for allowing me the opportunity to participate in the care of your patient.        More than 15 minutes of this 30 minutes visit was spent in counseling: during our discussion today, we talked about the etiology of  PIA as well as the potential ramifications of untreated sleep apnea, which could include daytime sleepiness, hypertension, heart disease and/or stroke.  We discussed potential treatment options, which could include weight loss, body positioning, continuous positive airway pressure (CPAP), or " referral for surgical consideration. Meanwhile, she  is urged to avoid supine sleep, weight gain and alcoholic beverages since all of these can worsen PIA.     Precautions: The patient was advised to abstain from driving should he feel sleepy or drowsy.    Follow up: MD only after the sleep study    Thank you for allowing me the opportunity to participate in the care of your patient.    This visit summary will be sent to referring provider via inbasket

## 2019-09-24 NOTE — PATIENT INSTRUCTIONS
I'm ordering machine through [facility]/.  They will call you in 10 days    Please keep in mind, that when you come to  the machine, you will be choosing the CPAP mask during that time.   If you realize that you are not fond of this mask when you start using it, you can exchange it for another one - for that please call the DME to the number above -#) DAYS    Please make sure you get the mask right within the first month =- by the time I see you, it will be too late to exchange the mask, and  too early for the insurance to cover an additional one.      Sincerely,    Dr. Stark    _______________________________      DIAL [phone]  OPTIONS ARE:  PRESS 1 - IF YOU ARE A , PHYSICIAN, OR NURSE CALLING ABOUT THE STATUS OF AN ORDER.  PRESS 2 - FOR OXYGEN, NEBULIZER, AND VENTILATOR EQUIPMENT.  PRESS 3 - FOR CPAP  OPTION 1 FOR RESUPPLY ORDER AND OPTION 2 FOR QUESTIONS ABOUT YOU UNIT OR TO SCHEDULE AN APPT.  PRESS 4 - FOR DIABETIC SUPPLIES.  PRESS 5 - FOR WALKERS, WHEELCHAIRS, CRUTCHES, CANES, AND ORTHOTIC BRACES.  PRESS 6 - IF YOU ARE CALLING TO CHECK THE STATUS OF YOUR ORDER.  PRESS 7 - FOR QUESTIONS REQUARDING BILLING OR YOUR INVOICE.  PRESS 8 - FOR ALL OTHER QUESTIONS

## 2019-09-25 NOTE — TELEPHONE ENCOUNTER
Spoke to pt and informed of lab results and increased dosage of synthroid. Pt verbalized understanding.

## 2019-09-26 DIAGNOSIS — E78.5 HYPERLIPIDEMIA, UNSPECIFIED HYPERLIPIDEMIA TYPE: Chronic | ICD-10-CM

## 2019-09-27 RX ORDER — ATORVASTATIN CALCIUM 40 MG/1
TABLET, FILM COATED ORAL
Qty: 30 TABLET | Refills: 3 | Status: SHIPPED | OUTPATIENT
Start: 2019-09-27 | End: 2020-02-21

## 2019-10-01 RX ORDER — ATORVASTATIN CALCIUM 40 MG/1
TABLET, FILM COATED ORAL
Qty: 90 TABLET | Refills: 2 | OUTPATIENT
Start: 2019-10-01

## 2019-10-01 RX ORDER — TEMAZEPAM 30 MG/1
CAPSULE ORAL
Qty: 30 CAPSULE | Refills: 0 | Status: SHIPPED | OUTPATIENT
Start: 2019-10-01 | End: 2019-12-22

## 2019-10-02 NOTE — PROGRESS NOTES
"Digital Medicine: Health  Follow-Up    Patient reports she had a sleep study done and she states she has "severe" sleep apnea.  Report she is waiting for her CPAP machine to be delivered.    The history is provided by the patient.   Hypertension       Follow Up  Follow-up reason(s): routine education          Assessment/Plan    SDOH    INTERVENTION(S)  reviewed monitoring technique and encouragement/support    Patient reports she is not taking BP as often as she would like.  Reminded patient requirements of HDMP.      There are no preventive care reminders to display for this patient.    Last 5 Patient Entered Readings                                      Current 30 Day Average: 134/71     Recent Readings 9/30/2019 9/27/2019 9/26/2019 9/24/2019 9/23/2019    SBP (mmHg) 144 125 125 126 151    DBP (mmHg) 70 70 74 76 78    Pulse 84 87 81 78 87                "

## 2019-10-09 ENCOUNTER — TELEPHONE (OUTPATIENT)
Dept: INTERNAL MEDICINE | Facility: CLINIC | Age: 84
End: 2019-10-09

## 2019-10-09 DIAGNOSIS — E78.5 HYPERLIPIDEMIA, UNSPECIFIED HYPERLIPIDEMIA TYPE: ICD-10-CM

## 2019-10-09 DIAGNOSIS — E03.9 HYPOTHYROIDISM, UNSPECIFIED TYPE: ICD-10-CM

## 2019-10-09 DIAGNOSIS — I10 HYPERTENSION, UNSPECIFIED TYPE: ICD-10-CM

## 2019-10-09 DIAGNOSIS — Z00.00 ANNUAL PHYSICAL EXAM: Primary | ICD-10-CM

## 2019-11-06 ENCOUNTER — PATIENT MESSAGE (OUTPATIENT)
Dept: ADMINISTRATIVE | Facility: OTHER | Age: 84
End: 2019-11-06

## 2019-11-06 RX ORDER — HYDROCHLOROTHIAZIDE 25 MG/1
TABLET ORAL
Qty: 30 TABLET | Refills: 10 | Status: SHIPPED | OUTPATIENT
Start: 2019-11-06 | End: 2020-07-03

## 2019-11-08 ENCOUNTER — PATIENT MESSAGE (OUTPATIENT)
Dept: SLEEP MEDICINE | Facility: CLINIC | Age: 84
End: 2019-11-08

## 2019-11-13 ENCOUNTER — PATIENT OUTREACH (OUTPATIENT)
Dept: OTHER | Facility: OTHER | Age: 84
End: 2019-11-13

## 2019-11-13 NOTE — PROGRESS NOTES
Digital Medicine: Health  Follow-Up    Brief follow up due to patient being at the grocery store.    The history is provided by the patient.     Follow Up  Reports she started wearing a CPAP machine and denies any issues with it.      INTERVENTION(S)  encouragement/support    PLAN  patient verbalizes understanding      There are no preventive care reminders to display for this patient.    Last 5 Patient Entered Readings                                      Current 30 Day Average: 134/76     Recent Readings 11/11/2019 11/8/2019 11/7/2019 11/7/2019 10/31/2019    SBP (mmHg) 139 126 127 150 141    DBP (mmHg) 77 69 76 76 83    Pulse 87 83 90 96 100                  Screenings    SDOH

## 2019-11-14 ENCOUNTER — OFFICE VISIT (OUTPATIENT)
Dept: SLEEP MEDICINE | Facility: CLINIC | Age: 84
End: 2019-11-14
Payer: COMMERCIAL

## 2019-11-14 VITALS
DIASTOLIC BLOOD PRESSURE: 68 MMHG | HEART RATE: 94 BPM | BODY MASS INDEX: 32.66 KG/M2 | HEIGHT: 66 IN | WEIGHT: 203.25 LBS | SYSTOLIC BLOOD PRESSURE: 137 MMHG

## 2019-11-14 DIAGNOSIS — G47.33 OSA (OBSTRUCTIVE SLEEP APNEA): Primary | ICD-10-CM

## 2019-11-14 PROCEDURE — 3078F PR MOST RECENT DIASTOLIC BLOOD PRESSURE < 80 MM HG: ICD-10-PCS | Mod: CPTII,S$GLB,, | Performed by: NURSE PRACTITIONER

## 2019-11-14 PROCEDURE — 99214 OFFICE O/P EST MOD 30 MIN: CPT | Mod: S$GLB,,, | Performed by: NURSE PRACTITIONER

## 2019-11-14 PROCEDURE — 3075F SYST BP GE 130 - 139MM HG: CPT | Mod: CPTII,S$GLB,, | Performed by: NURSE PRACTITIONER

## 2019-11-14 PROCEDURE — 99999 PR PBB SHADOW E&M-EST. PATIENT-LVL III: CPT | Mod: PBBFAC,,, | Performed by: NURSE PRACTITIONER

## 2019-11-14 PROCEDURE — 1101F PR PT FALLS ASSESS DOC 0-1 FALLS W/OUT INJ PAST YR: ICD-10-PCS | Mod: CPTII,S$GLB,, | Performed by: NURSE PRACTITIONER

## 2019-11-14 PROCEDURE — 99214 PR OFFICE/OUTPT VISIT, EST, LEVL IV, 30-39 MIN: ICD-10-PCS | Mod: S$GLB,,, | Performed by: NURSE PRACTITIONER

## 2019-11-14 PROCEDURE — 1101F PT FALLS ASSESS-DOCD LE1/YR: CPT | Mod: CPTII,S$GLB,, | Performed by: NURSE PRACTITIONER

## 2019-11-14 PROCEDURE — 99999 PR PBB SHADOW E&M-EST. PATIENT-LVL III: ICD-10-PCS | Mod: PBBFAC,,, | Performed by: NURSE PRACTITIONER

## 2019-11-14 PROCEDURE — 3078F DIAST BP <80 MM HG: CPT | Mod: CPTII,S$GLB,, | Performed by: NURSE PRACTITIONER

## 2019-11-14 PROCEDURE — 3075F PR MOST RECENT SYSTOLIC BLOOD PRESS GE 130-139MM HG: ICD-10-PCS | Mod: CPTII,S$GLB,, | Performed by: NURSE PRACTITIONER

## 2019-11-14 NOTE — PROGRESS NOTES
"Harriet Rutherford 84 y.o. year-old female returns for management of obstructive sleep apnea and CPAP equipment check. Last seen in clinic by Dr. Stark 09/24/2019. This is her initial visit with me.     Pt returns after set up of PAP machine on 10/07/2019 at [facility]. Pt sleep complaints of snoring, air gasping, witnessed apneas, excessive daytime sleepiness, and excessive daytime fatigue now resolved with PAP use. Denies oral/nasal drying with Dreamwear nasal pillows mask. Denies mask leaks. Denies rainout.  Denies pressure intolerance or air hunger. Denies congestion. Denies aerophagia. ESS 8.     Dreamstation APAP 5 - 18 cm, 29 days, > 4 hours: 90%, Predicted AHI: 2.8, 90%: 7 cm    Baseline Sleep Study: 08/13/2019  lb. AHI 27, RDI 39, O2 mirian 77.5%    Review of Systems: Sleep related symptoms as per HPI. Otherwise, a balance of 10 systems reviewed is negative.    Physical Exam:   /68   Pulse 94   Ht 5' 6" (1.676 m)   Wt 92.2 kg (203 lb 4.2 oz)   BMI 32.81 kg/m²   GENERAL: Well groomed    Assessment:     Obstructive sleep apnea, moderate by AHI, severe by AHI.  The patient symptomatically has snoring, air gasping, witnessed apneas, excessive daytime sleepiness, and excessive daytime fatigue which improves with CPAP use. The patient is adherent on CPAP and experiencing symptomatic benefit. The patient has met usage compliance after 10/07/2019 set up.  Medical co-morbidities: HLD and obesity. This warrants continued treatment for PIA.     Delayed sleep phase disorder, sleep onset latency down from 2 hours to 1 hr with OTC melatonin     Plan:     Continue APAP therapy at 5 - 18  cm H2O.   Continue melatonin, reduce to 1 - 2 mg vs 5 mg     Education: During our discussion today, we talked about the etiology of obstructive sleep apnea as well as the potential ramifications of untreated sleep apnea, which could include daytime sleepiness, hypertension, heart disease and/or stroke. We discussed potential " treatment options, which could include weight loss, body positioning, continuous positive airway pressure (CPAP), or referral for surgical consideration.     Behavior modification which includes losing weight, exercising, changing the sleep position, abstaining from alcohol, and avoiding certain medications    Precautions: The patient was advised to abstain from driving should they feel sleepy or drowsy    RTC in 6 months months, sooner if needed.

## 2019-11-25 ENCOUNTER — LAB VISIT (OUTPATIENT)
Dept: LAB | Facility: HOSPITAL | Age: 84
End: 2019-11-25
Attending: INTERNAL MEDICINE
Payer: COMMERCIAL

## 2019-11-25 DIAGNOSIS — E03.9 HYPOTHYROIDISM, UNSPECIFIED TYPE: ICD-10-CM

## 2019-11-25 LAB — TSH SERPL DL<=0.005 MIU/L-ACNC: 2.87 UIU/ML (ref 0.4–4)

## 2019-11-25 PROCEDURE — 84443 ASSAY THYROID STIM HORMONE: CPT

## 2019-11-25 PROCEDURE — 36415 COLL VENOUS BLD VENIPUNCTURE: CPT | Mod: PO

## 2019-12-05 ENCOUNTER — OFFICE VISIT (OUTPATIENT)
Dept: URGENT CARE | Facility: CLINIC | Age: 84
End: 2019-12-05
Payer: COMMERCIAL

## 2019-12-05 VITALS
BODY MASS INDEX: 32.33 KG/M2 | HEART RATE: 99 BPM | SYSTOLIC BLOOD PRESSURE: 114 MMHG | DIASTOLIC BLOOD PRESSURE: 60 MMHG | RESPIRATION RATE: 19 BRPM | OXYGEN SATURATION: 96 % | HEIGHT: 67 IN | WEIGHT: 206 LBS | TEMPERATURE: 99 F

## 2019-12-05 DIAGNOSIS — J06.9 UPPER RESPIRATORY TRACT INFECTION, UNSPECIFIED TYPE: Primary | ICD-10-CM

## 2019-12-05 LAB
CTP QC/QA: YES
S PYO RRNA THROAT QL PROBE: NEGATIVE

## 2019-12-05 PROCEDURE — 99213 OFFICE O/P EST LOW 20 MIN: CPT | Mod: S$GLB,,, | Performed by: FAMILY MEDICINE

## 2019-12-05 PROCEDURE — 87880 POCT RAPID STREP A: ICD-10-PCS | Mod: QW,S$GLB,, | Performed by: FAMILY MEDICINE

## 2019-12-05 PROCEDURE — 87880 STREP A ASSAY W/OPTIC: CPT | Mod: QW,S$GLB,, | Performed by: FAMILY MEDICINE

## 2019-12-05 PROCEDURE — 99213 PR OFFICE/OUTPT VISIT, EST, LEVL III, 20-29 MIN: ICD-10-PCS | Mod: S$GLB,,, | Performed by: FAMILY MEDICINE

## 2019-12-05 RX ORDER — BENZONATATE 100 MG/1
100 CAPSULE ORAL EVERY 6 HOURS PRN
Qty: 30 CAPSULE | Refills: 1 | Status: SHIPPED | OUTPATIENT
Start: 2019-12-05 | End: 2020-12-04

## 2019-12-05 RX ORDER — PROMETHAZINE HYDROCHLORIDE AND DEXTROMETHORPHAN HYDROBROMIDE 6.25; 15 MG/5ML; MG/5ML
5 SYRUP ORAL NIGHTLY PRN
Qty: 118 ML | Refills: 0 | Status: SHIPPED | OUTPATIENT
Start: 2019-12-05 | End: 2019-12-15

## 2019-12-05 NOTE — PROGRESS NOTES
"Subjective:       Patient ID: Harriet Rutherford is a 84 y.o. female.    Vitals:  height is 5' 7" (1.702 m) and weight is 93.4 kg (206 lb). Her oral temperature is 99.1 °F (37.3 °C). Her blood pressure is 114/60 and her pulse is 99. Her respiration is 19 and oxygen saturation is 96%.     Chief Complaint: Sore Throat    This is a 84 y.o. female who presents today with a chief complaint of   Sore throat, congestion, cough, x 1 week. Pt taking Mucus relief DM and Cepacol. With no relief     Sore Throat    The current episode started 1 to 4 weeks ago. The problem has been gradually worsening. Neither side of throat is experiencing more pain than the other. There has been no fever. The pain is at a severity of 6/10. The pain is moderate. Associated symptoms include congestion and coughing. Pertinent negatives include no ear pain, shortness of breath, stridor or vomiting. She has had no exposure to strep or mono. Treatments tried: mucus relief dm, cepachol. The treatment provided no relief.       Constitution: Negative for chills, sweating, fatigue and fever.   HENT: Positive for congestion, postnasal drip, sinus pressure and sore throat. Negative for ear pain, sinus pain and voice change.    Neck: Negative for painful lymph nodes.   Eyes: Negative for eye redness.   Respiratory: Positive for cough and sputum production. Negative for chest tightness, bloody sputum, COPD, shortness of breath, stridor, wheezing and asthma.    Gastrointestinal: Negative for nausea and vomiting.   Musculoskeletal: Negative for muscle ache.   Skin: Negative for rash.   Allergic/Immunologic: Negative for seasonal allergies and asthma.   Hematologic/Lymphatic: Negative for swollen lymph nodes.       Objective:      Physical Exam   Constitutional: She appears well-developed and well-nourished.   HENT:   Head: Normocephalic and atraumatic.   Mouth/Throat: Posterior oropharyngeal erythema present.   Eyes: Pupils are equal, round, and reactive to light. EOM " are normal.   Neck: Normal range of motion. Neck supple.   Cardiovascular: Normal rate, regular rhythm and normal heart sounds.   Pulmonary/Chest: Effort normal and breath sounds normal.   Abdominal: Soft.   Lymphadenopathy:     She has no cervical adenopathy.   Nursing note and vitals reviewed.        Assessment:       1. Upper respiratory tract infection, unspecified type        Plan:         Upper respiratory tract infection, unspecified type  -     POCT rapid strep A  -     benzonatate (TESSALON PERLES) 100 MG capsule; Take 1 capsule (100 mg total) by mouth every 6 (six) hours as needed for Cough.  Dispense: 30 capsule; Refill: 1  -     promethazine-dextromethorphan (PROMETHAZINE-DM) 6.25-15 mg/5 mL Syrp; Take 5 mLs by mouth nightly as needed.  Dispense: 118 mL; Refill: 0    Other orders  -     benzocaine-menthol (SORE THROAT, BENZOCAINE-MENTH,) 6-10 mg lozenge; Take 1 lozenge by mouth every 2 (two) hours as needed for Pain.  Dispense: 18 tablet; Refill: 0

## 2019-12-05 NOTE — PATIENT INSTRUCTIONS
Viral Upper Respiratory Illness (Adult)  You have a viral upper respiratory illness (URI), which is another term for the common cold. This illness is contagious during the first few days. It is spread through the air by coughing and sneezing. It may also be spread by direct contact (touching the sick person and then touching your own eyes, nose, or mouth). Frequent handwashing will decrease risk of spread. Most viral illnesses go away within 7 to 10 days with rest and simple home remedies. Sometimes the illness may last for several weeks. Antibiotics will not kill a virus, and they are generally not prescribed for this condition.    Home care  · If symptoms are severe, rest at home for the first 2 to 3 days. When you resume activity, don't let yourself get too tired.  · Avoid being exposed to cigarette smoke (yours or others).  · You may use acetaminophen or ibuprofen to control pain and fever, unless another medicine was prescribed. (Note: If you have chronic liver or kidney disease, have ever had a stomach ulcer or gastrointestinal bleeding, or are taking blood-thinning medicines, talk with your healthcare provider before using these medicines.) Aspirin should never be given to anyone under 18 years of age who is ill with a viral infection or fever. It may cause severe liver or brain damage.  · Your appetite may be poor, so a light diet is fine. Avoid dehydration by drinking 6 to 8 glasses of fluids per day (water, soft drinks, juices, tea, or soup). Extra fluids will help loosen secretions in the nose and lungs.  · Over-the-counter cold medicines will not shorten the length of time youre sick, but they may be helpful for the following symptoms: cough, sore throat, and nasal and sinus congestion. (Note: Do not use decongestants if you have high blood pressure.)  Follow-up care  Follow up with your healthcare provider, or as advised.  When to seek medical advice  Call your healthcare provider right away if any  of these occur:  · Cough with lots of colored sputum (mucus)  · Severe headache; face, neck, or ear pain  · Difficulty swallowing due to throat pain  · Fever of 100.4°F (38°C)  Call 911, or get immediate medical care  Call emergency services right away if any of these occur:  · Chest pain, shortness of breath, wheezing, or difficulty breathing  · Coughing up blood  · Inability to swallow due to throat pain  Date Last Reviewed: 9/13/2015  © 2278-5346 [employer]. [address] [place] [address]. All rights reserved. This information is not intended as a substitute for professional medical care. Always follow your healthcare professional's instructions.

## 2019-12-13 ENCOUNTER — PATIENT OUTREACH (OUTPATIENT)
Dept: OTHER | Facility: OTHER | Age: 84
End: 2019-12-13

## 2019-12-13 NOTE — PROGRESS NOTES
Digital Medicine: Health  Follow-Up    Brief encounter due to patient suffering with a cold.    The history is provided by the patient.     Follow Up  Follow-up reason(s): reading review      Readings are trending down       INTERVENTION(S)  reviewed monitoring technique and encouragement/support    PLAN  patient verbalizes understanding    Encouraged patient to rest and drink plenty of water and wash hands.      There are no preventive care reminders to display for this patient.    Last 5 Patient Entered Readings                                      Current 30 Day Average: 137/72     Recent Readings 12/8/2019 12/8/2019 11/29/2019 11/25/2019 11/25/2019    SBP (mmHg) 124 143 139 143 155    DBP (mmHg) 64 70 71 67 69    Pulse 85 93 95 99 92                  Screenings    SDOH

## 2019-12-19 ENCOUNTER — PATIENT OUTREACH (OUTPATIENT)
Dept: OTHER | Facility: OTHER | Age: 84
End: 2019-12-19

## 2019-12-19 NOTE — PROGRESS NOTES
Digital Medicine: Clinician Follow-Up    Called patient to discuss her at goal readings.    The history is provided by the patient.     Follow Up  Follow-up reason(s): reading review    Patient has remained at goal.    Patient denies hypotension symptoms.          INTERVENTION(S)  reviewed appropriate dose schedule and encouragement/support    PLAN  patient verbalizes understanding and continue monitoring    Patient aware to contact me with any hypotension symptoms prior to my next outreach.     Patient will continue with her current HTN medications.       There are no preventive care reminders to display for this patient.    Last 5 Patient Entered Readings                                      Current 30 Day Average: 136/72     Recent Readings 12/19/2019 12/16/2019 12/8/2019 12/8/2019 11/29/2019    SBP (mmHg) 128 128 124 143 139    DBP (mmHg) 75 70 64 70 71    Pulse 89 95 85 93 95             Hypertension Medications             amLODIPine (NORVASC) 10 MG tablet TAKE ONE TABLET BY MOUTH EVERY DAY.         hydroCHLOROthiazide (HYDRODIURIL) 25 MG tablet TAKE ONE TABLET BY MOUTH EVERY DAY.    losartan (COZAAR) 100 MG tablet Take 1 tablet (100 mg total) by mouth once daily.                             Medication Adherence Screening   She misses doses: never      Adherence tools used: none    Routine to take her medications.

## 2019-12-22 RX ORDER — TEMAZEPAM 30 MG/1
CAPSULE ORAL
Qty: 30 CAPSULE | Refills: 0 | Status: SHIPPED | OUTPATIENT
Start: 2019-12-22 | End: 2020-01-21 | Stop reason: SDUPTHER

## 2020-01-05 DIAGNOSIS — I10 ESSENTIAL HYPERTENSION: Chronic | ICD-10-CM

## 2020-01-06 RX ORDER — IRBESARTAN 300 MG/1
TABLET ORAL
Qty: 30 TABLET | Refills: 9 | OUTPATIENT
Start: 2020-01-06

## 2020-01-06 RX ORDER — LOSARTAN POTASSIUM 100 MG/1
100 TABLET ORAL DAILY
Qty: 30 TABLET | Refills: 0 | Status: SHIPPED | OUTPATIENT
Start: 2020-01-06 | End: 2020-04-22

## 2020-01-07 NOTE — PROGRESS NOTES
Subjective:       Patient ID: Harriet Rutherford is a 84 y.o. female.    Chief Complaint: Annual Exam    Patient is a 84 y.o.female with PAD who presents today for follow up    Chronic medical issues:    Hypothyroidism:  currently on 175 mcg synthroid     Depression: she is taking   lexapro 20 and wellbutrin 150 mg. Feels much better.      HTN: stable; no chest pain or SOB     HLD: Stable on statin     Stress incontinence: Follows with Dr. Kumar        She was told that she snores and apneic episodes.        Health Maintenance     Labs: reviewed  Breast Cancer: July 2019  Cervical Cancer: Pap Smear s/p JOSSUE   Colorectal Cancer: Colonoscopy 2016, due 2021  DEXA: due in may 2020    Review of Systems   Constitutional: Negative for appetite change, chills, diaphoresis and fever.   HENT: Negative for congestion, ear discharge, ear pain, postnasal drip, tinnitus, trouble swallowing and voice change.    Eyes: Negative for discharge, redness and itching.   Respiratory: Negative for cough, chest tightness, shortness of breath and wheezing.    Cardiovascular: Negative for chest pain, palpitations and leg swelling.   Gastrointestinal: Negative for abdominal pain, constipation, diarrhea, nausea and vomiting.   Endocrine: Negative for cold intolerance and heat intolerance.   Genitourinary: Negative for difficulty urinating, flank pain, hematuria and urgency.   Musculoskeletal: Negative for arthralgias, gait problem, myalgias and neck stiffness.   Skin: Negative for color change and rash.   Neurological: Negative for dizziness, seizures, syncope and headaches.   Hematological: Negative for adenopathy.   Psychiatric/Behavioral: Negative for agitation, behavioral problems, confusion and sleep disturbance.       Objective:      Physical Exam   Constitutional: She is oriented to person, place, and time. She appears well-developed and well-nourished. No distress.   HENT:   Head: Normocephalic and atraumatic.   Right Ear: External ear normal.    Left Ear: External ear normal.   Nose: Nose normal.   Mouth/Throat: Oropharynx is clear and moist. No oropharyngeal exudate.   Eyes: Pupils are equal, round, and reactive to light. Conjunctivae and EOM are normal. Right eye exhibits no discharge. Left eye exhibits no discharge. No scleral icterus.   Neck: Neck supple. No JVD present. No tracheal deviation present. No thyromegaly present.   Cardiovascular: Normal rate, normal heart sounds and intact distal pulses. Exam reveals no gallop and no friction rub.   No murmur heard.  Pulmonary/Chest: Effort normal and breath sounds normal. No stridor. No respiratory distress. She has no wheezes. She has no rales. She exhibits no tenderness.   Abdominal: Soft. Bowel sounds are normal. She exhibits no distension. There is no tenderness. There is no rebound.   Musculoskeletal: She exhibits no edema or tenderness.   Lymphadenopathy:     She has no cervical adenopathy.   Neurological: She is alert and oriented to person, place, and time.   Skin: Skin is warm and dry. No rash noted. She is not diaphoretic. No erythema.   Psychiatric: She has a normal mood and affect. Her behavior is normal.   Nursing note and vitals reviewed.      Assessment and Plan:       1. Hyperlipidemia, unspecified hyperlipidemia type  - stable on lipitor    2. Hypertension, unspecified type  - stable on current meds    3. Hypothyroidism, unspecified type  - stable on synthroid    4. Postmenopausal  - dexa due in may/ june    5. Other insomnia  - stable on restoril          No follow-ups on file.

## 2020-01-16 ENCOUNTER — LAB VISIT (OUTPATIENT)
Dept: LAB | Facility: HOSPITAL | Age: 85
End: 2020-01-16
Attending: INTERNAL MEDICINE
Payer: COMMERCIAL

## 2020-01-16 DIAGNOSIS — Z00.00 ANNUAL PHYSICAL EXAM: ICD-10-CM

## 2020-01-16 DIAGNOSIS — E78.5 HYPERLIPIDEMIA, UNSPECIFIED HYPERLIPIDEMIA TYPE: ICD-10-CM

## 2020-01-16 DIAGNOSIS — E03.9 HYPOTHYROIDISM, UNSPECIFIED TYPE: ICD-10-CM

## 2020-01-16 DIAGNOSIS — I10 HYPERTENSION, UNSPECIFIED TYPE: ICD-10-CM

## 2020-01-16 LAB
25(OH)D3+25(OH)D2 SERPL-MCNC: 31 NG/ML (ref 30–96)
ALBUMIN SERPL BCP-MCNC: 3.9 G/DL (ref 3.5–5.2)
ALP SERPL-CCNC: 75 U/L (ref 55–135)
ALT SERPL W/O P-5'-P-CCNC: 29 U/L (ref 10–44)
ANION GAP SERPL CALC-SCNC: 12 MMOL/L (ref 8–16)
AST SERPL-CCNC: 23 U/L (ref 10–40)
BASOPHILS # BLD AUTO: 0.04 K/UL (ref 0–0.2)
BASOPHILS NFR BLD: 0.6 % (ref 0–1.9)
BILIRUB SERPL-MCNC: 0.3 MG/DL (ref 0.1–1)
BUN SERPL-MCNC: 22 MG/DL (ref 8–23)
CALCIUM SERPL-MCNC: 9.3 MG/DL (ref 8.7–10.5)
CHLORIDE SERPL-SCNC: 104 MMOL/L (ref 95–110)
CHOLEST SERPL-MCNC: 208 MG/DL (ref 120–199)
CHOLEST/HDLC SERPL: 3.2 {RATIO} (ref 2–5)
CO2 SERPL-SCNC: 24 MMOL/L (ref 23–29)
CREAT SERPL-MCNC: 1.1 MG/DL (ref 0.5–1.4)
DIFFERENTIAL METHOD: ABNORMAL
EOSINOPHIL # BLD AUTO: 0.3 K/UL (ref 0–0.5)
EOSINOPHIL NFR BLD: 4.8 % (ref 0–8)
ERYTHROCYTE [DISTWIDTH] IN BLOOD BY AUTOMATED COUNT: 13.3 % (ref 11.5–14.5)
EST. GFR  (AFRICAN AMERICAN): 54 ML/MIN/1.73 M^2
EST. GFR  (NON AFRICAN AMERICAN): 46.9 ML/MIN/1.73 M^2
GLUCOSE SERPL-MCNC: 101 MG/DL (ref 70–110)
HCT VFR BLD AUTO: 40.9 % (ref 37–48.5)
HDLC SERPL-MCNC: 65 MG/DL (ref 40–75)
HDLC SERPL: 31.3 % (ref 20–50)
HGB BLD-MCNC: 12.6 G/DL (ref 12–16)
IMM GRANULOCYTES # BLD AUTO: 0.04 K/UL (ref 0–0.04)
IMM GRANULOCYTES NFR BLD AUTO: 0.6 % (ref 0–0.5)
LDLC SERPL CALC-MCNC: 118 MG/DL (ref 63–159)
LYMPHOCYTES # BLD AUTO: 1.4 K/UL (ref 1–4.8)
LYMPHOCYTES NFR BLD: 22 % (ref 18–48)
MCH RBC QN AUTO: 30.7 PG (ref 27–31)
MCHC RBC AUTO-ENTMCNC: 30.8 G/DL (ref 32–36)
MCV RBC AUTO: 100 FL (ref 82–98)
MONOCYTES # BLD AUTO: 0.7 K/UL (ref 0.3–1)
MONOCYTES NFR BLD: 10.6 % (ref 4–15)
NEUTROPHILS # BLD AUTO: 3.9 K/UL (ref 1.8–7.7)
NEUTROPHILS NFR BLD: 61.4 % (ref 38–73)
NONHDLC SERPL-MCNC: 143 MG/DL
NRBC BLD-RTO: 0 /100 WBC
PLATELET # BLD AUTO: 346 K/UL (ref 150–350)
PMV BLD AUTO: 9.8 FL (ref 9.2–12.9)
POTASSIUM SERPL-SCNC: 3.7 MMOL/L (ref 3.5–5.1)
PROT SERPL-MCNC: 6.6 G/DL (ref 6–8.4)
RBC # BLD AUTO: 4.1 M/UL (ref 4–5.4)
SODIUM SERPL-SCNC: 140 MMOL/L (ref 136–145)
TRIGL SERPL-MCNC: 125 MG/DL (ref 30–150)
TSH SERPL DL<=0.005 MIU/L-ACNC: 3.4 UIU/ML (ref 0.4–4)
WBC # BLD AUTO: 6.31 K/UL (ref 3.9–12.7)

## 2020-01-16 PROCEDURE — 80053 COMPREHEN METABOLIC PANEL: CPT

## 2020-01-16 PROCEDURE — 85025 COMPLETE CBC W/AUTO DIFF WBC: CPT

## 2020-01-16 PROCEDURE — 80061 LIPID PANEL: CPT

## 2020-01-16 PROCEDURE — 36415 COLL VENOUS BLD VENIPUNCTURE: CPT | Mod: PO

## 2020-01-16 PROCEDURE — 82306 VITAMIN D 25 HYDROXY: CPT

## 2020-01-16 PROCEDURE — 84443 ASSAY THYROID STIM HORMONE: CPT

## 2020-01-21 ENCOUNTER — OFFICE VISIT (OUTPATIENT)
Dept: INTERNAL MEDICINE | Facility: CLINIC | Age: 85
End: 2020-01-21
Payer: COMMERCIAL

## 2020-01-21 VITALS
TEMPERATURE: 98 F | SYSTOLIC BLOOD PRESSURE: 138 MMHG | HEART RATE: 89 BPM | RESPIRATION RATE: 16 BRPM | HEIGHT: 66 IN | DIASTOLIC BLOOD PRESSURE: 76 MMHG | BODY MASS INDEX: 33.62 KG/M2 | WEIGHT: 209.19 LBS

## 2020-01-21 DIAGNOSIS — E03.9 HYPOTHYROIDISM, UNSPECIFIED TYPE: Chronic | ICD-10-CM

## 2020-01-21 DIAGNOSIS — I10 HYPERTENSION, UNSPECIFIED TYPE: Chronic | ICD-10-CM

## 2020-01-21 DIAGNOSIS — Z78.0 POSTMENOPAUSAL: ICD-10-CM

## 2020-01-21 DIAGNOSIS — G47.09 OTHER INSOMNIA: ICD-10-CM

## 2020-01-21 DIAGNOSIS — E78.5 HYPERLIPIDEMIA, UNSPECIFIED HYPERLIPIDEMIA TYPE: Primary | Chronic | ICD-10-CM

## 2020-01-21 PROCEDURE — 99214 OFFICE O/P EST MOD 30 MIN: CPT | Mod: S$GLB,,, | Performed by: INTERNAL MEDICINE

## 2020-01-21 PROCEDURE — 1101F PR PT FALLS ASSESS DOC 0-1 FALLS W/OUT INJ PAST YR: ICD-10-PCS | Mod: CPTII,S$GLB,, | Performed by: INTERNAL MEDICINE

## 2020-01-21 PROCEDURE — 1159F MED LIST DOCD IN RCRD: CPT | Mod: S$GLB,,, | Performed by: INTERNAL MEDICINE

## 2020-01-21 PROCEDURE — 3075F PR MOST RECENT SYSTOLIC BLOOD PRESS GE 130-139MM HG: ICD-10-PCS | Mod: CPTII,S$GLB,, | Performed by: INTERNAL MEDICINE

## 2020-01-21 PROCEDURE — 1159F PR MEDICATION LIST DOCUMENTED IN MEDICAL RECORD: ICD-10-PCS | Mod: S$GLB,,, | Performed by: INTERNAL MEDICINE

## 2020-01-21 PROCEDURE — 1101F PT FALLS ASSESS-DOCD LE1/YR: CPT | Mod: CPTII,S$GLB,, | Performed by: INTERNAL MEDICINE

## 2020-01-21 PROCEDURE — 99999 PR PBB SHADOW E&M-EST. PATIENT-LVL III: ICD-10-PCS | Mod: PBBFAC,,, | Performed by: INTERNAL MEDICINE

## 2020-01-21 PROCEDURE — 3078F DIAST BP <80 MM HG: CPT | Mod: CPTII,S$GLB,, | Performed by: INTERNAL MEDICINE

## 2020-01-21 PROCEDURE — 99999 PR PBB SHADOW E&M-EST. PATIENT-LVL III: CPT | Mod: PBBFAC,,, | Performed by: INTERNAL MEDICINE

## 2020-01-21 PROCEDURE — 1126F AMNT PAIN NOTED NONE PRSNT: CPT | Mod: S$GLB,,, | Performed by: INTERNAL MEDICINE

## 2020-01-21 PROCEDURE — 1126F PR PAIN SEVERITY QUANTIFIED, NO PAIN PRESENT: ICD-10-PCS | Mod: S$GLB,,, | Performed by: INTERNAL MEDICINE

## 2020-01-21 PROCEDURE — 3075F SYST BP GE 130 - 139MM HG: CPT | Mod: CPTII,S$GLB,, | Performed by: INTERNAL MEDICINE

## 2020-01-21 PROCEDURE — 99214 PR OFFICE/OUTPT VISIT, EST, LEVL IV, 30-39 MIN: ICD-10-PCS | Mod: S$GLB,,, | Performed by: INTERNAL MEDICINE

## 2020-01-21 PROCEDURE — 3078F PR MOST RECENT DIASTOLIC BLOOD PRESSURE < 80 MM HG: ICD-10-PCS | Mod: CPTII,S$GLB,, | Performed by: INTERNAL MEDICINE

## 2020-01-21 RX ORDER — TEMAZEPAM 30 MG/1
30 CAPSULE ORAL NIGHTLY
Qty: 90 CAPSULE | Refills: 0 | Status: SHIPPED | OUTPATIENT
Start: 2020-01-21 | End: 2020-04-04

## 2020-01-30 ENCOUNTER — PATIENT OUTREACH (OUTPATIENT)
Dept: OTHER | Facility: OTHER | Age: 85
End: 2020-01-30

## 2020-01-30 NOTE — PROGRESS NOTES
Digital Medicine: Health  Follow-Up    Patient reports earlier this month she was taking decongestants due to sinuses.  States she is not taking decongestants anymore.    The history is provided by the patient.     Follow Up  Follow-up reason(s): reading review      Readings are trending down       INTERVENTION(S)  reviewed monitoring technique and encouragement/support    PLAN  patient verbalizes understanding      There are no preventive care reminders to display for this patient.    Last 5 Patient Entered Readings                                      Current 30 Day Average: 139/75     Recent Readings 1/27/2020 1/22/2020 1/21/2020 1/13/2020 1/12/2020    SBP (mmHg) 136 128 141 132 136    DBP (mmHg) 69 71 66 74 78    Pulse 92 84 83 88 81                  Screenings    SDOH

## 2020-02-04 RX ORDER — IRBESARTAN 300 MG/1
TABLET ORAL
Qty: 30 TABLET | Refills: 9 | Status: SHIPPED | OUTPATIENT
Start: 2020-02-04 | End: 2020-04-22

## 2020-02-16 ENCOUNTER — PATIENT MESSAGE (OUTPATIENT)
Dept: ADMINISTRATIVE | Facility: OTHER | Age: 85
End: 2020-02-16

## 2020-02-21 DIAGNOSIS — E78.5 HYPERLIPIDEMIA, UNSPECIFIED HYPERLIPIDEMIA TYPE: Chronic | ICD-10-CM

## 2020-02-21 RX ORDER — ATORVASTATIN CALCIUM 40 MG/1
TABLET, FILM COATED ORAL
Qty: 30 TABLET | Refills: 2 | Status: SHIPPED | OUTPATIENT
Start: 2020-02-21 | End: 2020-05-27

## 2020-02-21 RX ORDER — LOSARTAN POTASSIUM 50 MG/1
TABLET ORAL
Qty: 60 TABLET | Refills: 0 | Status: SHIPPED | OUTPATIENT
Start: 2020-02-21 | End: 2020-04-22

## 2020-03-05 DIAGNOSIS — I15.2 HYPERTENSION DUE TO ENDOCRINE DISORDER: Chronic | ICD-10-CM

## 2020-03-05 RX ORDER — AMLODIPINE BESYLATE 10 MG/1
TABLET ORAL
Qty: 90 TABLET | Refills: 1 | Status: SHIPPED | OUTPATIENT
Start: 2020-03-05 | End: 2020-07-21 | Stop reason: SDUPTHER

## 2020-03-11 ENCOUNTER — TELEPHONE (OUTPATIENT)
Dept: INTERNAL MEDICINE | Facility: CLINIC | Age: 85
End: 2020-03-11

## 2020-03-11 DIAGNOSIS — Z00.00 ANNUAL PHYSICAL EXAM: Primary | ICD-10-CM

## 2020-03-11 NOTE — TELEPHONE ENCOUNTER
----- Message from Julissa Simpson sent at 3/11/2020  2:11 PM CDT -----  Lab Orders Needed    I have scheduled the above patients annual physical and lab appointments. Lab orders need to be placed and linked.    Date of Annual Physical: 07/21/2020    Date of Lab appt: 07/14/2020    Thank You

## 2020-03-27 ENCOUNTER — PATIENT OUTREACH (OUTPATIENT)
Dept: OTHER | Facility: OTHER | Age: 85
End: 2020-03-27

## 2020-04-04 RX ORDER — TEMAZEPAM 30 MG/1
CAPSULE ORAL
Qty: 90 CAPSULE | Refills: 0 | Status: SHIPPED | OUTPATIENT
Start: 2020-04-04 | End: 2020-07-06

## 2020-04-22 RX ORDER — LOSARTAN POTASSIUM 50 MG/1
TABLET ORAL
Qty: 60 TABLET | Refills: 6 | Status: SHIPPED | OUTPATIENT
Start: 2020-04-22 | End: 2020-07-21 | Stop reason: SDUPTHER

## 2020-04-27 ENCOUNTER — TELEPHONE (OUTPATIENT)
Dept: INTERNAL MEDICINE | Facility: CLINIC | Age: 85
End: 2020-04-27

## 2020-04-27 NOTE — PROGRESS NOTES
Subjective:       Patient ID: Harriet Rutherford is a 84 y.o. female.    Chief Complaint: No chief complaint on file.    Patient is a 84 y.o.female with PAD who presents today for UTI  Established Patient - Audio Only Telehealth Visit     The patient location is: home  The chief complaint leading to consultation is: UTI  Visit type: Virtual visit with audio only (telephone)     The reason for the audio only service rather than synchronous audio and video virtual visit was related to technical difficulties or patient preference/necessity.     Each patient to whom I provide medical services by telemedicine is:  (1) informed of the relationship between the physician and patient and the respective role of any other health care provider with respect to management of the patient; and (2) notified that they may decline to receive medical services by telemedicine and may withdraw from such care at any time. Patient verbally consented to receive this service via voice-only telephone call.       HPI: she is having urinary frequency. Ongoing for one day. No burning with urination. Not a lot of urine is coming out each time. No fever or chills or back pain.     This service was not originating from a related E/M service provided within the previous 7 days nor will  to an E/M service or procedure within the next 24 hours or my soonest available appointment.  Prevailing standard of care was able to be met in this audio-only visit.      Review of Systems   Constitutional: Negative for appetite change, chills, diaphoresis and fever.   HENT: Negative for congestion, ear discharge, ear pain, postnasal drip, tinnitus, trouble swallowing and voice change.    Eyes: Negative for discharge, redness and itching.   Respiratory: Negative for cough, chest tightness, shortness of breath and wheezing.    Cardiovascular: Negative for chest pain, palpitations and leg swelling.   Gastrointestinal: Negative for abdominal pain, constipation,  diarrhea, nausea and vomiting.   Endocrine: Negative for cold intolerance and heat intolerance.   Genitourinary: Positive for frequency. Negative for difficulty urinating, flank pain, hematuria and urgency.   Musculoskeletal: Negative for arthralgias, gait problem, myalgias and neck stiffness.   Skin: Negative for color change and rash.   Neurological: Negative for dizziness, seizures, syncope and headaches.   Hematological: Negative for adenopathy.   Psychiatric/Behavioral: Negative for agitation, behavioral problems, confusion and sleep disturbance.       Objective:      Physical Exam   Psychiatric: She has a normal mood and affect. Her behavior is normal. Judgment and thought content normal.       Assessment and Plan:       1. Urinary tract infection without hematuria, site unspecified  - pt declines urine sample due to covid 19; advised that if she is not feeling better in 24 hours on abx, she needs to come do a urine sample; she is agreeable to that  - ciprofloxacin HCl (CIPRO) 500 MG tablet; Take 1 tablet (500 mg total) by mouth 2 (two) times daily. for 7 days  Dispense: 14 tablet; Refill: 0  - phenazopyridine (PYRIDIUM) 200 MG tablet; Take 1 tablet (200 mg total) by mouth 3 (three) times daily as needed for Pain.  Dispense: 9 tablet; Refill: 0  - increase water; cranberry juice, etc  2. PAD (peripheral artery disease)  - stable; monitoring; on statin        No follow-ups on file.

## 2020-04-27 NOTE — TELEPHONE ENCOUNTER
----- Message from Elizabeth Lorenz sent at 4/27/2020  1:13 PM CDT -----  Contact: Pt self Mobile [phone] or Home [phone]  Patient is calling in regards to her saying that she have a possible UTI, she said that yesterday she started having problems with frequent urination but when she try to urinate just a very little urine come out, she also have no control of her urine she said that she's been urinating on herself. She would like for you to write her a script for this and have it sent to..    [id number] - [facility] [place], [place] - [address] [facility]  [phone] fax# [phone]

## 2020-04-28 ENCOUNTER — OFFICE VISIT (OUTPATIENT)
Dept: INTERNAL MEDICINE | Facility: CLINIC | Age: 85
End: 2020-04-28
Payer: COMMERCIAL

## 2020-04-28 DIAGNOSIS — N39.0 URINARY TRACT INFECTION WITHOUT HEMATURIA, SITE UNSPECIFIED: Primary | ICD-10-CM

## 2020-04-28 DIAGNOSIS — I73.9 PAD (PERIPHERAL ARTERY DISEASE): ICD-10-CM

## 2020-04-28 PROCEDURE — 99214 OFFICE O/P EST MOD 30 MIN: CPT | Mod: 95,,, | Performed by: INTERNAL MEDICINE

## 2020-04-28 PROCEDURE — 99214 PR OFFICE/OUTPT VISIT, EST, LEVL IV, 30-39 MIN: ICD-10-PCS | Mod: 95,,, | Performed by: INTERNAL MEDICINE

## 2020-04-28 RX ORDER — CIPROFLOXACIN 500 MG/1
500 TABLET ORAL 2 TIMES DAILY
Qty: 14 TABLET | Refills: 0 | Status: SHIPPED | OUTPATIENT
Start: 2020-04-28 | End: 2020-05-05

## 2020-04-28 RX ORDER — PHENAZOPYRIDINE HYDROCHLORIDE 200 MG/1
200 TABLET, FILM COATED ORAL 3 TIMES DAILY PRN
Qty: 9 TABLET | Refills: 0 | Status: SHIPPED | OUTPATIENT
Start: 2020-04-28 | End: 2020-05-01

## 2020-05-04 DIAGNOSIS — N32.81 URGENCY-FREQUENCY SYNDROME: ICD-10-CM

## 2020-05-04 RX ORDER — OXYBUTYNIN CHLORIDE 10 MG/1
TABLET, EXTENDED RELEASE ORAL
Qty: 30 TABLET | Refills: 9 | Status: SHIPPED | OUTPATIENT
Start: 2020-05-04 | End: 2021-04-28 | Stop reason: SDUPTHER

## 2020-05-04 RX ORDER — BUPROPION HYDROCHLORIDE 150 MG/1
TABLET ORAL
Qty: 90 TABLET | Refills: 2 | Status: SHIPPED | OUTPATIENT
Start: 2020-05-04 | End: 2020-07-21 | Stop reason: SDUPTHER

## 2020-05-12 DIAGNOSIS — N39.3 STRESS INCONTINENCE: ICD-10-CM

## 2020-05-12 RX ORDER — IMIPRAMINE HYDROCHLORIDE 25 MG/1
TABLET, FILM COATED ORAL
Qty: 90 TABLET | Refills: 9 | Status: SHIPPED | OUTPATIENT
Start: 2020-05-12 | End: 2021-03-01

## 2020-05-27 DIAGNOSIS — E78.5 HYPERLIPIDEMIA, UNSPECIFIED HYPERLIPIDEMIA TYPE: Chronic | ICD-10-CM

## 2020-05-27 RX ORDER — ATORVASTATIN CALCIUM 40 MG/1
TABLET, FILM COATED ORAL
Qty: 30 TABLET | Refills: 2 | Status: SHIPPED | OUTPATIENT
Start: 2020-05-27 | End: 2020-06-07

## 2020-05-28 ENCOUNTER — TELEPHONE (OUTPATIENT)
Dept: SLEEP MEDICINE | Facility: CLINIC | Age: 85
End: 2020-05-28

## 2020-06-05 ENCOUNTER — PATIENT OUTREACH (OUTPATIENT)
Dept: OTHER | Facility: OTHER | Age: 85
End: 2020-06-05

## 2020-06-24 ENCOUNTER — OFFICE VISIT (OUTPATIENT)
Dept: SLEEP MEDICINE | Facility: CLINIC | Age: 85
End: 2020-06-24
Payer: COMMERCIAL

## 2020-06-24 VITALS
BODY MASS INDEX: 33.62 KG/M2 | HEIGHT: 66 IN | SYSTOLIC BLOOD PRESSURE: 122 MMHG | DIASTOLIC BLOOD PRESSURE: 68 MMHG | WEIGHT: 209.19 LBS | HEART RATE: 95 BPM

## 2020-06-24 DIAGNOSIS — G47.33 OSA (OBSTRUCTIVE SLEEP APNEA): Primary | ICD-10-CM

## 2020-06-24 PROCEDURE — 1126F PR PAIN SEVERITY QUANTIFIED, NO PAIN PRESENT: ICD-10-PCS | Mod: S$GLB,,, | Performed by: NURSE PRACTITIONER

## 2020-06-24 PROCEDURE — 1126F AMNT PAIN NOTED NONE PRSNT: CPT | Mod: S$GLB,,, | Performed by: NURSE PRACTITIONER

## 2020-06-24 PROCEDURE — 1101F PR PT FALLS ASSESS DOC 0-1 FALLS W/OUT INJ PAST YR: ICD-10-PCS | Mod: CPTII,S$GLB,, | Performed by: NURSE PRACTITIONER

## 2020-06-24 PROCEDURE — 99999 PR PBB SHADOW E&M-EST. PATIENT-LVL IV: CPT | Mod: PBBFAC,,, | Performed by: NURSE PRACTITIONER

## 2020-06-24 PROCEDURE — 3074F PR MOST RECENT SYSTOLIC BLOOD PRESSURE < 130 MM HG: ICD-10-PCS | Mod: CPTII,S$GLB,, | Performed by: NURSE PRACTITIONER

## 2020-06-24 PROCEDURE — 1159F PR MEDICATION LIST DOCUMENTED IN MEDICAL RECORD: ICD-10-PCS | Mod: S$GLB,,, | Performed by: NURSE PRACTITIONER

## 2020-06-24 PROCEDURE — 99213 OFFICE O/P EST LOW 20 MIN: CPT | Mod: S$GLB,,, | Performed by: NURSE PRACTITIONER

## 2020-06-24 PROCEDURE — 99213 PR OFFICE/OUTPT VISIT, EST, LEVL III, 20-29 MIN: ICD-10-PCS | Mod: S$GLB,,, | Performed by: NURSE PRACTITIONER

## 2020-06-24 PROCEDURE — 1101F PT FALLS ASSESS-DOCD LE1/YR: CPT | Mod: CPTII,S$GLB,, | Performed by: NURSE PRACTITIONER

## 2020-06-24 PROCEDURE — 99999 PR PBB SHADOW E&M-EST. PATIENT-LVL IV: ICD-10-PCS | Mod: PBBFAC,,, | Performed by: NURSE PRACTITIONER

## 2020-06-24 PROCEDURE — 1159F MED LIST DOCD IN RCRD: CPT | Mod: S$GLB,,, | Performed by: NURSE PRACTITIONER

## 2020-06-24 PROCEDURE — 3074F SYST BP LT 130 MM HG: CPT | Mod: CPTII,S$GLB,, | Performed by: NURSE PRACTITIONER

## 2020-06-24 PROCEDURE — 3078F DIAST BP <80 MM HG: CPT | Mod: CPTII,S$GLB,, | Performed by: NURSE PRACTITIONER

## 2020-06-24 PROCEDURE — 3078F PR MOST RECENT DIASTOLIC BLOOD PRESSURE < 80 MM HG: ICD-10-PCS | Mod: CPTII,S$GLB,, | Performed by: NURSE PRACTITIONER

## 2020-06-24 NOTE — PROGRESS NOTES
"  Harriet Rutherford 84 y.o. year-old female returns for management of obstructive sleep apnea and CPAP equipment check.     06/24/2020 KATLIN Valiente NP: Checked-in 17 minutes into 20 min appt.  Uses PAP machine regularly and denies breakthrough  snoring, air gasping, witnessed apneas, excessive daytime sleepiness, or excessive daytime fatigue. Denies mask or pressure issues. Denies nasal congestion or aerophagia. Reports taking melatonin 10 mg nightly because that's the only strength her drug store had available, but reports 5 mg just as effective. PCP also prescribed temazepam 30 mg to pt to further optimize ability to fall and stay asleep. Reports since starting temazepam that she has residual "grogginess" in the mornings.      ESS 8    CPAP Interrogation: did not bring machine  Encore: APAP 5 - 18 cm, 29 days, > 4 hr: 90%,  Predicted AHI: 4.3, 90%: 7.8 cm    11/14/2019 KATLIN Valiente NP: Pt returns after set up of PAP machine on 10/07/2019 at [facility]. Pt sleep complaints of snoring, air gasping, witnessed apneas, excessive daytime sleepiness, and excessive daytime fatigue now resolved with PAP use. Denies oral/nasal drying with Dreamwear nasal pillows mask. Denies mask leaks. Denies rainout.  Denies pressure intolerance or air hunger. Denies congestion. Denies aerophagia. ESS 8.      Dreamstation APAP 5 - 18 cm, 29 days, > 4 hours: 90%, Predicted AHI: 2.8, 90%: 7 cm     Baseline Sleep Study: 08/13/2019  lb. AHI 27, RDI 39, O2 mirian 77.5%    Review of Systems: Sleep related symptoms as per HPI. Otherwise, a balance of 10 systems reviewed is negative.    Physical Exam:   /68   Pulse 95   Ht 5' 6" (1.676 m)   Wt 94.9 kg (209 lb 3.5 oz)   BMI 33.77 kg/m²   GENERAL: Well groomed    Assessment:      Obstructive sleep apnea, moderate by AHI, severe by AHI.  The patient symptomatically has snoring, air gasping, witnessed apneas, excessive daytime sleepiness, and excessive daytime fatigue which improves with CPAP use. The " patient is adherent on CPAP and experiencing symptomatic benefit.  Medical co-morbidities: HLD and obesity. This warrants continued treatment for PIA.      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 Before addition of temazepam sleep more refreshing      Plan:      Continue APAP therapy at 5 - 18  cm H2O.   [facility] 3 [facility]. Do not exceed 5 mg especially since this dose effective. Discussed d/c vs reducing temazepam to 7.5 mg but pt prefers to discuss with PCP since PCP Rx med, she plans on only taking melatonin until then. Increase exposure to morning sunlight before 10 am     Education: During our discussion today, we talked about the etiology of obstructive sleep apnea as well as the potential ramifications of untreated sleep apnea, which could include daytime sleepiness, hypertension, heart disease and/or stroke. We discussed potential treatment options, which could include weight loss, body positioning, continuous positive airway pressure (CPAP), or referral for surgical consideration.      Behavior modification which includes losing weight, exercising, changing the sleep position, abstaining from alcohol, and avoiding certain medications     Precautions: The patient was advised to abstain from driving should they feel sleepy or drowsy     RTC in 12 months months, sooner if needed.

## 2020-07-01 NOTE — PROGRESS NOTES
"Digital Medicine: Health  Follow-Up    Patient reports she is having issues with her CPAP machine fitting to her face.  Reports she finds herself taking it off in the middle of the night.  Reports there is a "whistling" sound.  States Sleep Medicine suggested she go to [facility] and look at other face mask options.    The history is provided by the patient.   Follow Up  Follow-up reason(s): reading review      Readings are trending down       INTERVENTION(S)  encouragement/support and denied questions    PLAN  patient verbalizes understanding and continue monitoring      There are no preventive care reminders to display for this patient.    Last 5 Patient Entered Readings                                      Current 30 Day Average: 130/71     Recent Readings 6/29/2020 6/27/2020 6/25/2020 6/22/2020 6/20/2020    SBP (mmHg) 125 133 116 141 129    DBP (mmHg) 73 77 56 78 63    Pulse 84 81 86 82 86                  Screenings    SDOH  "

## 2020-07-06 RX ORDER — TEMAZEPAM 30 MG/1
CAPSULE ORAL
Qty: 90 CAPSULE | Refills: 0 | Status: SHIPPED | OUTPATIENT
Start: 2020-07-06 | End: 2020-10-19

## 2020-07-07 ENCOUNTER — TELEPHONE (OUTPATIENT)
Dept: INTERNAL MEDICINE | Facility: CLINIC | Age: 85
End: 2020-07-07

## 2020-07-07 DIAGNOSIS — Z12.31 ENCOUNTER FOR SCREENING MAMMOGRAM FOR BREAST CANCER: Primary | ICD-10-CM

## 2020-07-07 NOTE — TELEPHONE ENCOUNTER
----- Message from Yonatan Mtz sent at 7/6/2020  4:33 PM CDT -----  Regarding: mammo order          The Pt would like for you to send her Mammo order over and contact the Pt after so that she will know to call us back to schedule the appt.    Phone # [phone]

## 2020-07-14 ENCOUNTER — LAB VISIT (OUTPATIENT)
Dept: LAB | Facility: HOSPITAL | Age: 85
End: 2020-07-14
Attending: INTERNAL MEDICINE
Payer: COMMERCIAL

## 2020-07-14 DIAGNOSIS — Z00.00 ANNUAL PHYSICAL EXAM: ICD-10-CM

## 2020-07-14 LAB
25(OH)D3+25(OH)D2 SERPL-MCNC: 31 NG/ML (ref 30–96)
ALBUMIN SERPL BCP-MCNC: 4 G/DL (ref 3.5–5.2)
ALP SERPL-CCNC: 84 U/L (ref 55–135)
ALT SERPL W/O P-5'-P-CCNC: 30 U/L (ref 10–44)
ANION GAP SERPL CALC-SCNC: 9 MMOL/L (ref 8–16)
AST SERPL-CCNC: 23 U/L (ref 10–40)
BASOPHILS # BLD AUTO: 0.05 K/UL (ref 0–0.2)
BASOPHILS NFR BLD: 0.8 % (ref 0–1.9)
BILIRUB SERPL-MCNC: 0.3 MG/DL (ref 0.1–1)
BUN SERPL-MCNC: 32 MG/DL (ref 8–23)
CALCIUM SERPL-MCNC: 10 MG/DL (ref 8.7–10.5)
CHLORIDE SERPL-SCNC: 104 MMOL/L (ref 95–110)
CHOLEST SERPL-MCNC: 188 MG/DL (ref 120–199)
CHOLEST/HDLC SERPL: 3.2 {RATIO} (ref 2–5)
CO2 SERPL-SCNC: 26 MMOL/L (ref 23–29)
CREAT SERPL-MCNC: 1.3 MG/DL (ref 0.5–1.4)
DIFFERENTIAL METHOD: ABNORMAL
EOSINOPHIL # BLD AUTO: 0.3 K/UL (ref 0–0.5)
EOSINOPHIL NFR BLD: 4.8 % (ref 0–8)
ERYTHROCYTE [DISTWIDTH] IN BLOOD BY AUTOMATED COUNT: 12.9 % (ref 11.5–14.5)
EST. GFR  (AFRICAN AMERICAN): 44.1 ML/MIN/1.73 M^2
EST. GFR  (NON AFRICAN AMERICAN): 38.3 ML/MIN/1.73 M^2
GLUCOSE SERPL-MCNC: 103 MG/DL (ref 70–110)
HCT VFR BLD AUTO: 40.6 % (ref 37–48.5)
HDLC SERPL-MCNC: 59 MG/DL (ref 40–75)
HDLC SERPL: 31.4 % (ref 20–50)
HGB BLD-MCNC: 12.8 G/DL (ref 12–16)
IMM GRANULOCYTES # BLD AUTO: 0.02 K/UL (ref 0–0.04)
IMM GRANULOCYTES NFR BLD AUTO: 0.3 % (ref 0–0.5)
LDLC SERPL CALC-MCNC: 104.2 MG/DL (ref 63–159)
LYMPHOCYTES # BLD AUTO: 1.5 K/UL (ref 1–4.8)
LYMPHOCYTES NFR BLD: 23.2 % (ref 18–48)
MCH RBC QN AUTO: 30.4 PG (ref 27–31)
MCHC RBC AUTO-ENTMCNC: 31.5 G/DL (ref 32–36)
MCV RBC AUTO: 96 FL (ref 82–98)
MONOCYTES # BLD AUTO: 0.9 K/UL (ref 0.3–1)
MONOCYTES NFR BLD: 13.5 % (ref 4–15)
NEUTROPHILS # BLD AUTO: 3.6 K/UL (ref 1.8–7.7)
NEUTROPHILS NFR BLD: 57.4 % (ref 38–73)
NONHDLC SERPL-MCNC: 129 MG/DL
NRBC BLD-RTO: 0 /100 WBC
PLATELET # BLD AUTO: 364 K/UL (ref 150–350)
PMV BLD AUTO: 10 FL (ref 9.2–12.9)
POTASSIUM SERPL-SCNC: 3.7 MMOL/L (ref 3.5–5.1)
PROT SERPL-MCNC: 6.8 G/DL (ref 6–8.4)
RBC # BLD AUTO: 4.21 M/UL (ref 4–5.4)
SODIUM SERPL-SCNC: 139 MMOL/L (ref 136–145)
T4 FREE SERPL-MCNC: 1.32 NG/DL (ref 0.71–1.51)
TRIGL SERPL-MCNC: 124 MG/DL (ref 30–150)
TSH SERPL DL<=0.005 MIU/L-ACNC: 0.37 UIU/ML (ref 0.4–4)
WBC # BLD AUTO: 6.3 K/UL (ref 3.9–12.7)

## 2020-07-14 PROCEDURE — 84443 ASSAY THYROID STIM HORMONE: CPT

## 2020-07-14 PROCEDURE — 82306 VITAMIN D 25 HYDROXY: CPT

## 2020-07-14 PROCEDURE — 80053 COMPREHEN METABOLIC PANEL: CPT

## 2020-07-14 PROCEDURE — 85025 COMPLETE CBC W/AUTO DIFF WBC: CPT

## 2020-07-14 PROCEDURE — 36415 COLL VENOUS BLD VENIPUNCTURE: CPT

## 2020-07-14 PROCEDURE — 80061 LIPID PANEL: CPT

## 2020-07-14 PROCEDURE — 84439 ASSAY OF FREE THYROXINE: CPT

## 2020-07-21 ENCOUNTER — HOSPITAL ENCOUNTER (OUTPATIENT)
Dept: RADIOLOGY | Facility: HOSPITAL | Age: 85
Discharge: HOME | End: 2020-07-21
Attending: INTERNAL MEDICINE
Payer: COMMERCIAL

## 2020-07-21 ENCOUNTER — OFFICE VISIT (OUTPATIENT)
Dept: INTERNAL MEDICINE | Facility: CLINIC | Age: 85
End: 2020-07-21
Payer: COMMERCIAL

## 2020-07-21 VITALS
OXYGEN SATURATION: 96 % | TEMPERATURE: 97 F | HEART RATE: 90 BPM | WEIGHT: 212.06 LBS | BODY MASS INDEX: 34.08 KG/M2 | SYSTOLIC BLOOD PRESSURE: 140 MMHG | DIASTOLIC BLOOD PRESSURE: 70 MMHG | RESPIRATION RATE: 20 BRPM | HEIGHT: 66 IN

## 2020-07-21 DIAGNOSIS — I15.2 HYPERTENSION DUE TO ENDOCRINE DISORDER: Chronic | ICD-10-CM

## 2020-07-21 DIAGNOSIS — Z13.820 SCREENING FOR OSTEOPOROSIS: ICD-10-CM

## 2020-07-21 DIAGNOSIS — F41.9 ANXIETY AND DEPRESSION: ICD-10-CM

## 2020-07-21 DIAGNOSIS — E03.9 HYPOTHYROIDISM, UNSPECIFIED TYPE: Primary | Chronic | ICD-10-CM

## 2020-07-21 DIAGNOSIS — Z12.31 ENCOUNTER FOR SCREENING MAMMOGRAM FOR BREAST CANCER: ICD-10-CM

## 2020-07-21 DIAGNOSIS — Z00.00 ANNUAL PHYSICAL EXAM: ICD-10-CM

## 2020-07-21 DIAGNOSIS — N32.81 URGENCY-FREQUENCY SYNDROME: ICD-10-CM

## 2020-07-21 DIAGNOSIS — R06.09 DOE (DYSPNEA ON EXERTION): ICD-10-CM

## 2020-07-21 DIAGNOSIS — E78.5 HYPERLIPIDEMIA, UNSPECIFIED HYPERLIPIDEMIA TYPE: Chronic | ICD-10-CM

## 2020-07-21 DIAGNOSIS — F32.A ANXIETY AND DEPRESSION: ICD-10-CM

## 2020-07-21 DIAGNOSIS — D35.00 ADRENAL ADENOMA, UNSPECIFIED LATERALITY: ICD-10-CM

## 2020-07-21 DIAGNOSIS — N18.30 CKD (CHRONIC KIDNEY DISEASE) STAGE 3, GFR 30-59 ML/MIN: ICD-10-CM

## 2020-07-21 DIAGNOSIS — Z78.0 POST-MENOPAUSAL: ICD-10-CM

## 2020-07-21 PROCEDURE — 99999 PR PBB SHADOW E&M-EST. PATIENT-LVL III: CPT | Mod: PBBFAC,,, | Performed by: NURSE PRACTITIONER

## 2020-07-21 PROCEDURE — 77063 BREAST TOMOSYNTHESIS BI: CPT | Mod: 26,,, | Performed by: RADIOLOGY

## 2020-07-21 PROCEDURE — 77067 MAMMO DIGITAL SCREENING BILAT WITH TOMOSYNTHESIS_CAD: ICD-10-PCS | Mod: 26,,, | Performed by: RADIOLOGY

## 2020-07-21 PROCEDURE — 99999 PR PBB SHADOW E&M-EST. PATIENT-LVL III: ICD-10-PCS | Mod: PBBFAC,,, | Performed by: NURSE PRACTITIONER

## 2020-07-21 PROCEDURE — 77067 SCR MAMMO BI INCL CAD: CPT | Mod: TC,PO

## 2020-07-21 PROCEDURE — 77067 SCR MAMMO BI INCL CAD: CPT | Mod: 26,,, | Performed by: RADIOLOGY

## 2020-07-21 PROCEDURE — 77063 MAMMO DIGITAL SCREENING BILAT WITH TOMOSYNTHESIS_CAD: ICD-10-PCS | Mod: 26,,, | Performed by: RADIOLOGY

## 2020-07-21 PROCEDURE — 99397 PR PREVENTIVE VISIT,EST,65 & OVER: ICD-10-PCS | Mod: S$GLB,,, | Performed by: NURSE PRACTITIONER

## 2020-07-21 PROCEDURE — 99397 PER PM REEVAL EST PAT 65+ YR: CPT | Mod: S$GLB,,, | Performed by: NURSE PRACTITIONER

## 2020-07-21 RX ORDER — ATORVASTATIN CALCIUM 40 MG/1
40 TABLET, FILM COATED ORAL DAILY
Qty: 90 TABLET | Refills: 3 | Status: SHIPPED | OUTPATIENT
Start: 2020-07-21 | End: 2021-05-31

## 2020-07-21 RX ORDER — HYDROCHLOROTHIAZIDE 25 MG/1
25 TABLET ORAL DAILY
Qty: 90 TABLET | Refills: 1 | Status: SHIPPED | OUTPATIENT
Start: 2020-07-21 | End: 2021-06-28

## 2020-07-21 RX ORDER — FENOFIBRATE 160 MG/1
160 TABLET ORAL DAILY
Qty: 90 TABLET | Refills: 0 | Status: SHIPPED | OUTPATIENT
Start: 2020-07-21 | End: 2020-12-30

## 2020-07-21 RX ORDER — LEVOTHYROXINE SODIUM 175 UG/1
175 TABLET ORAL DAILY
Qty: 90 TABLET | Refills: 3 | Status: SHIPPED | OUTPATIENT
Start: 2020-07-21 | End: 2020-09-15

## 2020-07-21 RX ORDER — ESCITALOPRAM OXALATE 20 MG/1
20 TABLET ORAL DAILY
Qty: 90 TABLET | Refills: 2 | Status: SHIPPED | OUTPATIENT
Start: 2020-07-21 | End: 2021-09-20

## 2020-07-21 RX ORDER — BUPROPION HYDROCHLORIDE 150 MG/1
150 TABLET ORAL DAILY
Qty: 90 TABLET | Refills: 2 | Status: SHIPPED | OUTPATIENT
Start: 2020-07-21 | End: 2021-07-25

## 2020-07-21 RX ORDER — LOSARTAN POTASSIUM 50 MG/1
100 TABLET ORAL DAILY
Qty: 180 TABLET | Refills: 3 | Status: SHIPPED | OUTPATIENT
Start: 2020-07-21 | End: 2020-08-17 | Stop reason: SDUPTHER

## 2020-07-21 RX ORDER — AMLODIPINE BESYLATE 10 MG/1
10 TABLET ORAL DAILY
Qty: 90 TABLET | Refills: 1 | Status: SHIPPED | OUTPATIENT
Start: 2020-07-21 | End: 2021-03-01

## 2020-07-21 NOTE — PROGRESS NOTES
[facility] Note    Chief Complaint      Chief Complaint   Patient presents with    Annual Exam     History of Present Illness      Harriet Rutherford is a 84 y.o. female patient of Dr. Gaines who is new to me presents today for annual over the exam.  Patient feeling well no complaints, denies shortness of breath or chest pain, reviewed meds and history patient. BP slightly elevated today in clinic. Kidney function a little lower than from 6 months ago, pt admits to not drinking much water, no new meds.     Health Maintenance     Labs: reviewed, will repeat TSH and CMP  Breast Cancer: July 2019  Cervical Cancer: Pap Smear s/p JOSSUE   Colorectal Cancer: Colonoscopy 2016, due 2021  DEXA: due in may 2020       Chronic medical issues:  Hypothyroidism:  currently on 175 mcg synthroid  Depression: she is taking  lexapro 20 and wellbutrin 150 mg. Feels much better.   HTN: stable; no chest pain or SOB  HLD: Stable on statin  Stress incontinence: Follows with Dr. Kumar  She was told that she snores and apneic episodes.       Problem List Items Addressed This Visit        Cardiac/Vascular    Hypertension (Chronic)    Relevant Medications    amLODIPine (NORVASC) 10 MG tablet    fenofibrate 160 MG Tab    escitalopram oxalate (LEXAPRO) 20 MG tablet    hydroCHLOROthiazide (HYDRODIURIL) 25 MG tablet    levothyroxine (SYNTHROID, LEVOTHROID) 175 MCG tablet    atorvastatin (LIPITOR) 40 MG tablet    losartan (COZAAR) 50 MG tablet    buPROPion (WELLBUTRIN XL) 150 MG TB24 tablet    Hyperlipidemia (Chronic)    Relevant Medications    amLODIPine (NORVASC) 10 MG tablet    fenofibrate 160 MG Tab    escitalopram oxalate (LEXAPRO) 20 MG tablet    hydroCHLOROthiazide (HYDRODIURIL) 25 MG tablet    levothyroxine (SYNTHROID, LEVOTHROID) 175 MCG tablet    atorvastatin (LIPITOR) 40 MG tablet    losartan (COZAAR) 50 MG tablet    buPROPion (WELLBUTRIN XL) 150 MG TB24 tablet       Renal/    Urgency-frequency syndrome    Relevant Orders     Comprehensive metabolic panel       Oncology    Adrenal adenoma    Relevant Orders    Comprehensive metabolic panel       Endocrine    Hypothyroidism - Primary (Chronic)    Relevant Medications    amLODIPine (NORVASC) 10 MG tablet    fenofibrate 160 MG Tab    escitalopram oxalate (LEXAPRO) 20 MG tablet    hydroCHLOROthiazide (HYDRODIURIL) 25 MG tablet    levothyroxine (SYNTHROID, LEVOTHROID) 175 MCG tablet    atorvastatin (LIPITOR) 40 MG tablet    losartan (COZAAR) 50 MG tablet    buPROPion (WELLBUTRIN XL) 150 MG TB24 tablet    Other Relevant Orders    TSH       Other    ALMENDAREZ (dyspnea on exertion)    Relevant Medications    amLODIPine (NORVASC) 10 MG tablet    fenofibrate 160 MG Tab    escitalopram oxalate (LEXAPRO) 20 MG tablet    hydroCHLOROthiazide (HYDRODIURIL) 25 MG tablet    levothyroxine (SYNTHROID, LEVOTHROID) 175 MCG tablet    atorvastatin (LIPITOR) 40 MG tablet    losartan (COZAAR) 50 MG tablet    buPROPion (WELLBUTRIN XL) 150 MG TB24 tablet      Other Visit Diagnoses     Annual physical exam        Anxiety and depression        Relevant Medications    amLODIPine (NORVASC) 10 MG tablet    fenofibrate 160 MG Tab    escitalopram oxalate (LEXAPRO) 20 MG tablet    hydroCHLOROthiazide (HYDRODIURIL) 25 MG tablet    levothyroxine (SYNTHROID, LEVOTHROID) 175 MCG tablet    atorvastatin (LIPITOR) 40 MG tablet    losartan (COZAAR) 50 MG tablet    buPROPion (WELLBUTRIN XL) 150 MG TB24 tablet    Screening for osteoporosis        Relevant Orders    DXA Bone Density Spine And Hip    Post-menopausal        Relevant Orders    DXA Bone Density Spine And Hip    CKD (chronic kidney disease) stage 3, GFR 30-59 ml/min              Health Maintenance   Topic Date Due    DEXA SCAN  05/15/2020    Mammogram  07/15/2020    Colonoscopy  05/19/2021    Lipid Panel  07/14/2021    TETANUS VACCINE  09/15/2028    Pneumococcal Vaccine (65+ Low/Medium Risk)  Completed       Past Medical History:   Diagnosis Date    Breast cyst      Cataract     Chronic back pain     Hepatic steatosis 10/19/2016    Hyperlipidemia LDL goal <130     Hypertension     Hypothyroidism     OP (osteoporosis)     Smoker     Stress incontinence     Urinary tract infection        Past Surgical History:   Procedure Laterality Date    BLADDER SURGERY      lifted     CATARACT EXTRACTION      Both eyes    COLONOSCOPY N/A 2016    Procedure: COLONOSCOPY;  Surgeon: Wander Mahajan MD;  Location: [address]);  Service: Endoscopy;  Laterality: N/A;  Per Dr. Mahajan use [employer]     CYSTOSCOPY      FOOT SURGERY Right 15    OSTEOTOMY-METATARSAL    HYSTERECTOMY      OOPHORECTOMY         family history includes Breast cancer in her daughter and daughter; Cancer in her brother; Cataracts in her brother, father, maternal grandmother, mother, sister, and sister; Heart attack in her father and paternal grandfather; Heart disease in her father and paternal grandfather; Heart failure in her father and paternal grandfather; Hypertension in her father; Stroke in her father.    Social History     Tobacco Use    Smoking status: Former Smoker     Packs/day: 1.00     Years: 60.00     Pack years: 60.00     Types: Cigarettes     Quit date: 2016     Years since quittin.0    Smokeless tobacco: Never Used   Substance Use Topics    Alcohol use: Yes     Alcohol/week: 0.0 standard drinks     Types: 1 - 2 Shots of liquor per week     Frequency: 2-4 times a month     Drinks per session: 1 or 2     Binge frequency: Never     Comment: RARELY    Drug use: No       Review of Systems   Constitutional: Negative for chills and fever.   HENT: Negative for congestion, sinus pain and sore throat.    Eyes: Negative for blurred vision.   Respiratory: Negative for cough, shortness of breath and wheezing.    Cardiovascular: Negative for chest pain, palpitations and leg swelling.   Gastrointestinal: Negative for abdominal pain, constipation, diarrhea, nausea and vomiting.    Genitourinary: Negative for dysuria.   Musculoskeletal: Negative for myalgias.   Skin: Negative for rash.   Neurological: Negative for dizziness, weakness and headaches.   Psychiatric/Behavioral: Negative for depression. The patient is not nervous/anxious.         Outpatient Encounter Medications as of 7/21/2020   Medication Sig Note Dispense Refill    amLODIPine (NORVASC) 10 MG tablet Take 1 tablet (10 mg total) by mouth once daily.  90 tablet 1    aspirin (ECOTRIN) 81 MG EC tablet Take 81 mg by mouth once daily. 11/3/2015: Hold for surgery      atorvastatin (LIPITOR) 40 MG tablet Take 1 tablet (40 mg total) by mouth once daily.  90 tablet 3    buPROPion (WELLBUTRIN XL) 150 MG TB24 tablet Take 1 tablet (150 mg total) by mouth once daily.  90 tablet 2    CA CITRATE/MGOX/VIT D3/B6/MIN (CITRACAL PLUS ORAL) Take 1,200 mg by mouth once daily. 11/3/2015: Hold am of surgery      escitalopram oxalate (LEXAPRO) 20 MG tablet Take 1 tablet (20 mg total) by mouth once daily.  90 tablet 2    fenofibrate 160 MG Tab Take 1 tablet (160 mg total) by mouth once daily.  90 tablet 0    hydroCHLOROthiazide (HYDRODIURIL) 25 MG tablet Take 1 tablet (25 mg total) by mouth once daily.  90 tablet 1    hydrOXYzine HCl (ATARAX) 25 MG tablet Take 1 tablet (25 mg total) by mouth 3 (three) times daily as needed for Itching.  60 tablet 3    imipramine (TOFRANIL) 25 MG tablet TAKE 1 TABLET BY MOUTH THREE TIMES A DAY  90 tablet 9    levothyroxine (SYNTHROID, LEVOTHROID) 175 MCG tablet Take 1 tablet (175 mcg total) by mouth once daily.  90 tablet 3    losartan (COZAAR) 50 MG tablet Take 2 tablets (100 mg total) by mouth once daily.  180 tablet 3    multivitamin capsule Take 1 capsule by mouth once daily. 11/3/2015: Hold am of surgery      oxybutynin (DITROPAN-XL) 10 MG 24 hr tablet TAKE ONE TABLET BY MOUTH EVERY DAY.  30 tablet 9    temazepam (RESTORIL) 30 mg capsule TAKE ONE CAPSULE BY MOUTH NIGHTLY   90 capsule 0     "[DISCONTINUED] amLODIPine (NORVASC) 10 MG tablet TAKE ONE TABLET BY MOUTH EVERY DAY.  90 tablet 1    [DISCONTINUED] atorvastatin (LIPITOR) 40 MG tablet TAKE ONE TABLET BY MOUTH EVERY DAY.  30 tablet 1    [DISCONTINUED] buPROPion (WELLBUTRIN XL) 150 MG TB24 tablet TAKE ONE TABLET BY MOUTH EVERY DAY.  90 tablet 2    [DISCONTINUED] escitalopram oxalate (LEXAPRO) 20 MG tablet TAKE ONE TABLET BY MOUTH EVERY DAY.   30 tablet 2    [DISCONTINUED] fenofibrate 160 MG Tab TAKE ONE TABLET BY MOUTH EVERY DAY.   90 tablet 0    [DISCONTINUED] hydroCHLOROthiazide (HYDRODIURIL) 25 MG tablet TAKE ONE TABLET BY MOUTH EVERY DAY.   90 tablet 0    [DISCONTINUED] levothyroxine (SYNTHROID, LEVOTHROID) 175 MCG tablet Take 1 tablet (175 mcg total) by mouth once daily.  30 tablet 11    [DISCONTINUED] losartan (COZAAR) 50 MG tablet TAKE TWO TABLETS BY MOUTH ONCE DAILY  60 tablet 6    benzonatate (TESSALON PERLES) 100 MG capsule Take 1 capsule (100 mg total) by mouth every 6 (six) hours as needed for Cough. (Patient not taking: Reported on 7/21/2020)  30 capsule 1     No facility-administered encounter medications on file as of 7/21/2020.         Review of patient's allergies indicates:   Allergen Reactions    Lisinopril Other (See Comments)     cough    Sulfur      Other reaction(s): Urticaria    Sulfa (sulfonamide antibiotics) Rash       Physical Exam      Vital Signs  Temp: 97.3 °F (36.3 °C)  Temp src: Oral  Pulse: 90  Resp: 20  SpO2: 96 %  BP: (!) 140/70  BP Location: Right arm  Patient Position: Sitting  Height and Weight  Height: 5' 6" (167.6 cm)  Weight: 96.2 kg (212 lb 1.3 oz)  BSA (Calculated - sq m): 2.12 sq meters  BMI (Calculated): 34.2  Weight in (lb) to have BMI = 25: 154.6]    Physical Exam  Vitals signs and nursing note reviewed.   Constitutional:       Appearance: Normal appearance. She is well-developed.   HENT:      Head: Normocephalic and atraumatic.      Right Ear: External ear normal.      Left Ear: External ear " normal.   Eyes:      Conjunctiva/sclera: Conjunctivae normal.      Pupils: Pupils are equal, round, and reactive to light.   Neck:      Musculoskeletal: Normal range of motion and neck supple.      Thyroid: No thyromegaly.      Vascular: No JVD.      Trachea: No tracheal deviation.   Cardiovascular:      Rate and Rhythm: Normal rate and regular rhythm.      Heart sounds: Normal heart sounds. No murmur.   Pulmonary:      Effort: Pulmonary effort is normal.      Breath sounds: Normal breath sounds.   Chest:      Chest wall: No tenderness.   Abdominal:      General: Bowel sounds are normal.      Palpations: Abdomen is soft.      Tenderness: There is no abdominal tenderness. There is no guarding.   Musculoskeletal: Normal range of motion.   Lymphadenopathy:      Cervical: No cervical adenopathy.   Skin:     General: Skin is warm and dry.   Neurological:      Mental Status: She is alert and oriented to person, place, and time.   Psychiatric:         Behavior: Behavior normal.         Thought Content: Thought content normal.         Judgment: Judgment normal.          Laboratory:  CBC:  Recent Labs   Lab Result Units 07/14/20  0900   WBC K/uL 6.30   RBC M/uL 4.21   Hemoglobin g/dL 12.8   Hematocrit % 40.6   Platelets K/uL 364*   Mean Corpuscular Volume fL 96   Mean Corpuscular Hemoglobin pg 30.4   Mean Corpuscular Hemoglobin Conc g/dL 31.5*     CMP:  Recent Labs   Lab Result Units 07/14/20  0900   Glucose mg/dL 103   Calcium mg/dL 10.0   Albumin g/dL 4.0   Total Protein g/dL 6.8   Sodium mmol/L 139   Potassium mmol/L 3.7   CO2 mmol/L 26   Chloride mmol/L 104   BUN, Bld mg/dL 32*   Alkaline Phosphatase U/L 84   ALT U/L 30   AST U/L 23   Total Bilirubin mg/dL 0.3     URINALYSIS:  Recent Labs   Lab Result Units 07/14/20  1033   Color, UA  Yellow   Specific Gravity, UA  1.015   pH, UA  5.0   Protein, UA  Negative   Bacteria /hpf Occasional   Nitrite, UA  Negative   Leukocytes, UA  2+*   Hyaline Casts, UA /lpf 21*       LIPIDS:  Recent Labs   Lab Result Units 07/14/20  0900   TSH uIU/mL 0.372*   HDL mg/dL 59   Cholesterol mg/dL 188   Triglycerides mg/dL 124   LDL Cholesterol mg/dL 104.2   Hdl/Cholesterol Ratio % 31.4   Non-HDL Cholesterol mg/dL 129   Total Cholesterol/HDL Ratio  3.2     TSH:  Recent Labs   Lab Result Units 07/14/20  0900   TSH uIU/mL 0.372*     A1C:  No results for input(s): HGBA1C in the last 2160 hours.      Assessment/Plan     Harriet Rutherford is a 84 y.o.female with:    1. Annual physical exam    2. Hypertension due to endocrine disorder  - amLODIPine (NORVASC) 10 MG tablet; Take 1 tablet (10 mg total) by mouth once daily.  Dispense: 90 tablet; Refill: 1  - fenofibrate 160 MG Tab; Take 1 tablet (160 mg total) by mouth once daily.  Dispense: 90 tablet; Refill: 0  - escitalopram oxalate (LEXAPRO) 20 MG tablet; Take 1 tablet (20 mg total) by mouth once daily.  Dispense: 90 tablet; Refill: 2  - hydroCHLOROthiazide (HYDRODIURIL) 25 MG tablet; Take 1 tablet (25 mg total) by mouth once daily.  Dispense: 90 tablet; Refill: 1  - levothyroxine (SYNTHROID, LEVOTHROID) 175 MCG tablet; Take 1 tablet (175 mcg total) by mouth once daily.  Dispense: 90 tablet; Refill: 3  - atorvastatin (LIPITOR) 40 MG tablet; Take 1 tablet (40 mg total) by mouth once daily.  Dispense: 90 tablet; Refill: 3  - losartan (COZAAR) 50 MG tablet; Take 2 tablets (100 mg total) by mouth once daily.  Dispense: 180 tablet; Refill: 3  - buPROPion (WELLBUTRIN XL) 150 MG TB24 tablet; Take 1 tablet (150 mg total) by mouth once daily.  Dispense: 90 tablet; Refill: 2    3. Hyperlipidemia, unspecified hyperlipidemia type  - amLODIPine (NORVASC) 10 MG tablet; Take 1 tablet (10 mg total) by mouth once daily.  Dispense: 90 tablet; Refill: 1  - fenofibrate 160 MG Tab; Take 1 tablet (160 mg total) by mouth once daily.  Dispense: 90 tablet; Refill: 0  - escitalopram oxalate (LEXAPRO) 20 MG tablet; Take 1 tablet (20 mg total) by mouth once daily.  Dispense: 90 tablet;  Refill: 2  - hydroCHLOROthiazide (HYDRODIURIL) 25 MG tablet; Take 1 tablet (25 mg total) by mouth once daily.  Dispense: 90 tablet; Refill: 1  - levothyroxine (SYNTHROID, LEVOTHROID) 175 MCG tablet; Take 1 tablet (175 mcg total) by mouth once daily.  Dispense: 90 tablet; Refill: 3  - atorvastatin (LIPITOR) 40 MG tablet; Take 1 tablet (40 mg total) by mouth once daily.  Dispense: 90 tablet; Refill: 3  - losartan (COZAAR) 50 MG tablet; Take 2 tablets (100 mg total) by mouth once daily.  Dispense: 180 tablet; Refill: 3  - buPROPion (WELLBUTRIN XL) 150 MG TB24 tablet; Take 1 tablet (150 mg total) by mouth once daily.  Dispense: 90 tablet; Refill: 2    4. Hypothyroidism, unspecified type  - amLODIPine (NORVASC) 10 MG tablet; Take 1 tablet (10 mg total) by mouth once daily.  Dispense: 90 tablet; Refill: 1  - fenofibrate 160 MG Tab; Take 1 tablet (160 mg total) by mouth once daily.  Dispense: 90 tablet; Refill: 0  - escitalopram oxalate (LEXAPRO) 20 MG tablet; Take 1 tablet (20 mg total) by mouth once daily.  Dispense: 90 tablet; Refill: 2  - hydroCHLOROthiazide (HYDRODIURIL) 25 MG tablet; Take 1 tablet (25 mg total) by mouth once daily.  Dispense: 90 tablet; Refill: 1  - levothyroxine (SYNTHROID, LEVOTHROID) 175 MCG tablet; Take 1 tablet (175 mcg total) by mouth once daily.  Dispense: 90 tablet; Refill: 3  - atorvastatin (LIPITOR) 40 MG tablet; Take 1 tablet (40 mg total) by mouth once daily.  Dispense: 90 tablet; Refill: 3  - losartan (COZAAR) 50 MG tablet; Take 2 tablets (100 mg total) by mouth once daily.  Dispense: 180 tablet; Refill: 3  - buPROPion (WELLBUTRIN XL) 150 MG TB24 tablet; Take 1 tablet (150 mg total) by mouth once daily.  Dispense: 90 tablet; Refill: 2  - TSH; Future    5. ALMENDAREZ (dyspnea on exertion)  - amLODIPine (NORVASC) 10 MG tablet; Take 1 tablet (10 mg total) by mouth once daily.  Dispense: 90 tablet; Refill: 1  - fenofibrate 160 MG Tab; Take 1 tablet (160 mg total) by mouth once daily.  Dispense: 90  tablet; Refill: 0  - escitalopram oxalate (LEXAPRO) 20 MG tablet; Take 1 tablet (20 mg total) by mouth once daily.  Dispense: 90 tablet; Refill: 2  - hydroCHLOROthiazide (HYDRODIURIL) 25 MG tablet; Take 1 tablet (25 mg total) by mouth once daily.  Dispense: 90 tablet; Refill: 1  - levothyroxine (SYNTHROID, LEVOTHROID) 175 MCG tablet; Take 1 tablet (175 mcg total) by mouth once daily.  Dispense: 90 tablet; Refill: 3  - atorvastatin (LIPITOR) 40 MG tablet; Take 1 tablet (40 mg total) by mouth once daily.  Dispense: 90 tablet; Refill: 3  - losartan (COZAAR) 50 MG tablet; Take 2 tablets (100 mg total) by mouth once daily.  Dispense: 180 tablet; Refill: 3  - buPROPion (WELLBUTRIN XL) 150 MG TB24 tablet; Take 1 tablet (150 mg total) by mouth once daily.  Dispense: 90 tablet; Refill: 2    6. Anxiety and depression  - amLODIPine (NORVASC) 10 MG tablet; Take 1 tablet (10 mg total) by mouth once daily.  Dispense: 90 tablet; Refill: 1  - fenofibrate 160 MG Tab; Take 1 tablet (160 mg total) by mouth once daily.  Dispense: 90 tablet; Refill: 0  - escitalopram oxalate (LEXAPRO) 20 MG tablet; Take 1 tablet (20 mg total) by mouth once daily.  Dispense: 90 tablet; Refill: 2  - hydroCHLOROthiazide (HYDRODIURIL) 25 MG tablet; Take 1 tablet (25 mg total) by mouth once daily.  Dispense: 90 tablet; Refill: 1  - levothyroxine (SYNTHROID, LEVOTHROID) 175 MCG tablet; Take 1 tablet (175 mcg total) by mouth once daily.  Dispense: 90 tablet; Refill: 3  - atorvastatin (LIPITOR) 40 MG tablet; Take 1 tablet (40 mg total) by mouth once daily.  Dispense: 90 tablet; Refill: 3  - losartan (COZAAR) 50 MG tablet; Take 2 tablets (100 mg total) by mouth once daily.  Dispense: 180 tablet; Refill: 3  - buPROPion (WELLBUTRIN XL) 150 MG TB24 tablet; Take 1 tablet (150 mg total) by mouth once daily.  Dispense: 90 tablet; Refill: 2    7. Screening for osteoporosis  - DXA Bone Density Spine And Hip; Future    8. Post-menopausal  - DXA Bone Density Spine And  Hip; Future    9. CKD (chronic kidney disease) stage 3, GFR 30-59 ml/min    10. Urgency-frequency syndrome  - Comprehensive metabolic panel; Future    11. Adrenal adenoma, unspecified laterality  - Comprehensive metabolic panel; Future      -Continue current medications and maintain follow up with specialists.  Return to clinic in 6 months with Dr Guevara or sooner for any concerns  Repeat labs  BMD ordered       Erin Jiminez, NP-C [facility]

## 2020-07-23 ENCOUNTER — TELEPHONE (OUTPATIENT)
Dept: INTERNAL MEDICINE | Facility: CLINIC | Age: 85
End: 2020-07-23

## 2020-07-23 NOTE — TELEPHONE ENCOUNTER
----- Message from Shira Anthony MD sent at 7/22/2020  4:51 PM CDT -----  Please note that your mammogram was read as follows  Impression:  There is no mammographic evidence of malignancy.      Your results are being reviewed and reported by Dr. Erickson as Dr. Parham is unavailable at this time.   Please do not hesitate to call/email if you have any questions or concerns

## 2020-08-03 NOTE — PROGRESS NOTES
Digital Medicine: Clinician Follow-Up    Called patient to discuss her high readings.     The history is provided by the patient.   Follow-up reason(s): routine follow up.     Readings are trending up   due to lifestyle change and She's increased her intake of store made or restaurant serviced meals..    Patient is not experiencing signs/symptoms of hypotension.  Patient is not experiencing signs/symptoms of hypertension.        Last 5 Patient Entered Readings                                      Current 30 Day Average: 142/78     Recent Readings 8/1/2020 8/1/2020 7/31/2020 7/28/2020 7/23/2020    SBP (mmHg) 149 150 174 151 129    DBP (mmHg) 88 75 84 81 78    Pulse 93 90 95 89 85                           Medication Adherence-Medication adherence was assessed.          ASSESSMENT(S)  Patients BP average is 142/78 mmHg, which is above goal. Patient's BP goal is less than or equal to 140/90 per 2017 ACC/AHA Hypertension Guidelines.       PLAN  Continue current therapy:  Provided patient education: We discussed sodium consumption and recommended decreasing her intake of store or restaurant meals. If no improvement in blood pressure readings occur with this change, I will consider medication adjustments.     Patient verbalizes understanding. Patient did not express questions or concerns and patient has contact information if needed.          There are no preventive care reminders to display for this patient.      Hypertension Medications             amLODIPine (NORVASC) 10 MG tablet Take 1 tablet (10 mg total) by mouth once daily.    hydroCHLOROthiazide (HYDRODIURIL) 25 MG tablet Take 1 tablet (25 mg total) by mouth once daily.    losartan (COZAAR) 100 MG tablet TAKE ONE TABLET BY MOUTH EVERY DAY.

## 2020-08-17 ENCOUNTER — PATIENT OUTREACH (OUTPATIENT)
Dept: OTHER | Facility: OTHER | Age: 85
End: 2020-08-17

## 2020-08-17 NOTE — PROGRESS NOTES
Digital Medicine: Clinician Follow-Up    Called patient to discuss her lack of readings. She lost her cell phone and she is having technical difficulties with adding the applications on her new phone. She stated she is not technically savvy and needs to go to the [facility] instead of receiving help over the phone.    The history is provided by the patient.   Follow-up reason(s): routine follow up.     Hypertension  Patient needs assistance troubleshooting: [facility] support needed.    Patient is not experiencing signs/symptoms of hypotension.  Patient is not experiencing signs/symptoms of hypertension.          Last 5 Patient Entered Readings                                      Current 30 Day Average: 145/77     Recent Readings 8/5/2020 8/1/2020 8/1/2020 7/31/2020 7/28/2020    SBP (mmHg) 148 149 150 174 151    DBP (mmHg) 70 88 75 84 81    Pulse 85 93 90 95 89                           Medication Adherence-Medication adherence was assessed.          ASSESSMENT(S)  Patients BP average is 145/77 mmHg, which is above goal. Patient's BP goal is less than or equal to 140/90 per 2017 ACC/AHA Hypertension Guidelines.       Hypertension Plan  Continue current therapy.  Additional referral made. Patient referred to the [facility] for technical support.     Addressed any questions or concerns and patient has my contact information if needed prior to next outreach. Patient verbalizes understanding.            There are no preventive care reminders to display for this patient.      Hypertension Medications             amLODIPine (NORVASC) 10 MG tablet Take 1 tablet (10 mg total) by mouth once daily.    hydroCHLOROthiazide (HYDRODIURIL) 25 MG tablet Take 1 tablet (25 mg total) by mouth once daily.    losartan (COZAAR) 100 MG tablet TAKE ONE TABLET BY MOUTH EVERY DAY.

## 2020-08-31 ENCOUNTER — PATIENT OUTREACH (OUTPATIENT)
Dept: OTHER | Facility: OTHER | Age: 85
End: 2020-08-31

## 2020-08-31 NOTE — PROGRESS NOTES
Digital Medicine: Health  Follow-Up    The history is provided by the patient.                 Patient needs assistance troubleshooting: [facility] support needed.    Additional Follow-up details: Patient spoke with clinician 2 weeks ago and states she just returned from the [facility].  States they were able to set up the apps on her phone, and she will start taking BP readings today.    States she noticed her BP was elevated, but states this could be a temporary problem.  Will keep an eye on readings and will follow up in 2 weeks.    Lifestyle    Additional monitoring needed.       Addressed any questions or concerns and patient has my contact information if needed prior to next outreach. Patient verbalizes understanding.      Explained the importance of self-monitoring and medication adherence. Encouraged the patient to communicate with their health  for lifestyle modifications to help improve or maintain a healthy lifestyle.            There are no preventive care reminders to display for this patient.    Last 5 Patient Entered Readings                                      Current 30 Day Average: 149/78     Recent Readings 8/5/2020 8/1/2020 8/1/2020 7/31/2020 7/28/2020    SBP (mmHg) 148 149 150 174 151    DBP (mmHg) 70 88 75 84 81    Pulse 85 93 90 95 89

## 2020-09-08 ENCOUNTER — PATIENT OUTREACH (OUTPATIENT)
Dept: ADMINISTRATIVE | Facility: OTHER | Age: 85
End: 2020-09-08

## 2020-09-10 ENCOUNTER — OFFICE VISIT (OUTPATIENT)
Dept: OPTOMETRY | Facility: CLINIC | Age: 85
End: 2020-09-10
Payer: COMMERCIAL

## 2020-09-10 DIAGNOSIS — Z13.5 GLAUCOMA SCREENING: ICD-10-CM

## 2020-09-10 DIAGNOSIS — H04.123 DRY EYES, BILATERAL: Primary | ICD-10-CM

## 2020-09-10 DIAGNOSIS — H52.4 PRESBYOPIA: ICD-10-CM

## 2020-09-10 PROCEDURE — 99999 PR PBB SHADOW E&M-EST. PATIENT-LVL III: ICD-10-PCS | Mod: PBBFAC,,, | Performed by: OPTOMETRIST

## 2020-09-10 PROCEDURE — 99999 PR PBB SHADOW E&M-EST. PATIENT-LVL III: CPT | Mod: PBBFAC,,, | Performed by: OPTOMETRIST

## 2020-09-10 PROCEDURE — 92014 PR EYE EXAM, EST PATIENT,COMPREHESV: ICD-10-PCS | Mod: S$GLB,,, | Performed by: OPTOMETRIST

## 2020-09-10 PROCEDURE — 92015 PR REFRACTION: ICD-10-PCS | Mod: S$GLB,,, | Performed by: OPTOMETRIST

## 2020-09-10 PROCEDURE — 92014 COMPRE OPH EXAM EST PT 1/>: CPT | Mod: S$GLB,,, | Performed by: OPTOMETRIST

## 2020-09-10 PROCEDURE — 92015 DETERMINE REFRACTIVE STATE: CPT | Mod: S$GLB,,, | Performed by: OPTOMETRIST

## 2020-09-11 ENCOUNTER — APPOINTMENT (OUTPATIENT)
Dept: RADIOLOGY | Facility: CLINIC | Age: 85
End: 2020-09-11
Attending: NURSE PRACTITIONER
Payer: COMMERCIAL

## 2020-09-11 ENCOUNTER — LAB VISIT (OUTPATIENT)
Dept: LAB | Facility: HOSPITAL | Age: 85
End: 2020-09-11
Attending: NURSE PRACTITIONER
Payer: COMMERCIAL

## 2020-09-11 DIAGNOSIS — Z78.0 POST-MENOPAUSAL: ICD-10-CM

## 2020-09-11 DIAGNOSIS — Z13.820 SCREENING FOR OSTEOPOROSIS: ICD-10-CM

## 2020-09-11 DIAGNOSIS — D35.00 ADRENAL ADENOMA, UNSPECIFIED LATERALITY: ICD-10-CM

## 2020-09-11 DIAGNOSIS — E03.9 HYPOTHYROIDISM, UNSPECIFIED TYPE: Chronic | ICD-10-CM

## 2020-09-11 DIAGNOSIS — N32.81 URGENCY-FREQUENCY SYNDROME: ICD-10-CM

## 2020-09-11 LAB
ALBUMIN SERPL BCP-MCNC: 3.8 G/DL (ref 3.5–5.2)
ALP SERPL-CCNC: 87 U/L (ref 55–135)
ALT SERPL W/O P-5'-P-CCNC: 23 U/L (ref 10–44)
ANION GAP SERPL CALC-SCNC: 9 MMOL/L (ref 8–16)
AST SERPL-CCNC: 20 U/L (ref 10–40)
BILIRUB SERPL-MCNC: 0.2 MG/DL (ref 0.1–1)
BUN SERPL-MCNC: 23 MG/DL (ref 8–23)
CALCIUM SERPL-MCNC: 9.5 MG/DL (ref 8.7–10.5)
CHLORIDE SERPL-SCNC: 106 MMOL/L (ref 95–110)
CO2 SERPL-SCNC: 26 MMOL/L (ref 23–29)
CREAT SERPL-MCNC: 1.1 MG/DL (ref 0.5–1.4)
EST. GFR  (AFRICAN AMERICAN): 53.7 ML/MIN/1.73 M^2
EST. GFR  (NON AFRICAN AMERICAN): 46.5 ML/MIN/1.73 M^2
GLUCOSE SERPL-MCNC: 102 MG/DL (ref 70–110)
POTASSIUM SERPL-SCNC: 4.1 MMOL/L (ref 3.5–5.1)
PROT SERPL-MCNC: 6.5 G/DL (ref 6–8.4)
SODIUM SERPL-SCNC: 141 MMOL/L (ref 136–145)
T4 FREE SERPL-MCNC: 1.45 NG/DL (ref 0.71–1.51)
TSH SERPL DL<=0.005 MIU/L-ACNC: 0.19 UIU/ML (ref 0.4–4)

## 2020-09-11 PROCEDURE — 77080 DEXA BONE DENSITY SPINE HIP: ICD-10-PCS | Mod: 26,,, | Performed by: INTERNAL MEDICINE

## 2020-09-11 PROCEDURE — 36415 COLL VENOUS BLD VENIPUNCTURE: CPT | Mod: PO

## 2020-09-11 PROCEDURE — 77080 DXA BONE DENSITY AXIAL: CPT | Mod: 26,,, | Performed by: INTERNAL MEDICINE

## 2020-09-11 PROCEDURE — 77080 DXA BONE DENSITY AXIAL: CPT | Mod: TC,PO

## 2020-09-11 PROCEDURE — 84439 ASSAY OF FREE THYROXINE: CPT

## 2020-09-11 PROCEDURE — 80053 COMPREHEN METABOLIC PANEL: CPT

## 2020-09-11 PROCEDURE — 84443 ASSAY THYROID STIM HORMONE: CPT

## 2020-09-11 NOTE — PROGRESS NOTES
HPI     DLS; 5/23/18  Pt states she is needing help with [place] dist [facility] she is not able to see the   street signs until she is right up on it. Also having glare problems at   night with other cars. Pt states her eye lids are sometimes itchy and   wants them looked at.   Occ. Floaters, no flashes  No gtts     Last edited by Tray Stratton, OD on 9/10/2020  2:14 PM. (History)        ROS     Positive for: Eyes (cat surgery OU/YAG OU)    Negative for: Constitutional, Gastrointestinal, Neurological, Skin,   Genitourinary, Musculoskeletal, HENT, Endocrine, Cardiovascular,   Respiratory, Psychiatric, Allergic/Imm, Heme/Lymph    Last edited by Tray Stratton, OD on 9/10/2020  2:14 PM. (History)        Assessment /Plan     For exam results, see Encounter Report.    Dry eyes, bilateral    Glaucoma screening    Presbyopia      HPI     DLS; 5/23/18  Pt states she is needing help with her dist [facility] she is not able to see the   street signs until she is right up on it. Also having glare problems at   night with other cars. Pt states her eye lids are sometimes itchy and   wants them looked at.   Occ. Floaters, no flashes  No gtts     Last edited by Tray Stratton, OD on 9/10/2020  2:14 PM. (History)        ROS     Positive for: Eyes (cat surgery OU/YAG OU)    Negative for: Constitutional, Gastrointestinal, Neurological, Skin,   Genitourinary, Musculoskeletal, HENT, Endocrine, Cardiovascular,   Respiratory, Psychiatric, Allergic/Imm, Heme/Lymph    Last edited by Tray Stratton, OD on 9/10/2020  2:14 PM. (History)        Assessment /Plan     For exam results, see Encounter Report.    Dry eyes, bilateral    Glaucoma screening    Presbyopia        1. Sp pciol/YAG OU--pt wishes new Rx  2. JOHN--pt to restart SOOTHE XP ATs QID    PLAN:    rtc 1 yr

## 2020-09-15 ENCOUNTER — PATIENT MESSAGE (OUTPATIENT)
Dept: INTERNAL MEDICINE | Facility: CLINIC | Age: 85
End: 2020-09-15

## 2020-09-15 DIAGNOSIS — E03.9 HYPOTHYROIDISM, UNSPECIFIED TYPE: Primary | ICD-10-CM

## 2020-09-15 RX ORDER — LEVOTHYROXINE SODIUM 150 UG/1
150 TABLET ORAL
Qty: 30 TABLET | Refills: 3 | Status: SHIPPED | OUTPATIENT
Start: 2020-09-15 | End: 2021-01-19

## 2020-09-15 NOTE — TELEPHONE ENCOUNTER
Electrolytes and liver levels in normal range  Kidney function improved  Thyroid function more out of range, need to decrease thyroid 175 mcg to 150mcg and repeat level in 2 months.

## 2020-09-17 ENCOUNTER — PATIENT MESSAGE (OUTPATIENT)
Dept: INTERNAL MEDICINE | Facility: CLINIC | Age: 85
End: 2020-09-17

## 2020-09-17 NOTE — TELEPHONE ENCOUNTER
Bone scan:     Impression:     1. Osteoporosis of the lumbar spine  2. Compared with previous DXA, BMD at the lumbar spine has remained stable, and the BMD at the total hip has remained stable.    Continue recommendations for bone help and strength such as  The daily OTC calcium w/ vit d    Recommend repeat in 2-3 years

## 2020-09-28 ENCOUNTER — PATIENT OUTREACH (OUTPATIENT)
Dept: OTHER | Facility: OTHER | Age: 85
End: 2020-09-28

## 2020-09-28 NOTE — PROGRESS NOTES
Digital Medicine: Clinician Follow-Up    Called patient to discuss her elevated readings last week.    The history is provided by the patient.   Follow-up reason(s): routine follow up.     Hypertension    Readings are trending up due to lifestyle change.    Patient is not experiencing signs/symptoms of hypotension.  Patient is not experiencing signs/symptoms of hypertension.          Last 5 Patient Entered Readings                                      Current 30 Day Average: 135/71     Recent Readings 9/21/2020 9/20/2020 9/10/2020 9/8/2020 9/8/2020    SBP (mmHg) 140 144 147 127 138    DBP (mmHg) 67 67 68 74 71    Pulse 87 87 94 88 91                             Medication Adherence-Medication adherence was assessed.          ASSESSMENT(S)  Patients BP average is 135/71 mmHg, which is above goal. Patient's BP goal is less than or equal to 140/90 per 2017 ACC/AHA Hypertension Guidelines.     Patient was out of town last week causing her to eat higher salt foods. She was eating out and was outside of her normal diet routine.      Hypertension Plan  Continue current therapy.  Continue current diet/physical activity routine. Resume previous diet of home cooked meals.       Addressed patient questions and patient has my contact information if needed prior to next outreach. Patient verbalizes understanding.            There are no preventive care reminders to display for this patient.  There are no preventive care reminders to display for this patient.    Hypertension Medications             amLODIPine (NORVASC) 10 MG tablet Take 1 tablet (10 mg total) by mouth once daily.    hydroCHLOROthiazide (HYDRODIURIL) 25 MG tablet Take 1 tablet (25 mg total) by mouth once daily.    losartan (COZAAR) 100 MG tablet TAKE ONE TABLET BY MOUTH EVERY DAY.

## 2020-10-12 ENCOUNTER — TELEPHONE (OUTPATIENT)
Dept: INTERNAL MEDICINE | Facility: CLINIC | Age: 85
End: 2020-10-12

## 2020-10-14 ENCOUNTER — PATIENT OUTREACH (OUTPATIENT)
Dept: OTHER | Facility: OTHER | Age: 85
End: 2020-10-14

## 2020-10-21 ENCOUNTER — CLINICAL SUPPORT (OUTPATIENT)
Dept: URGENT CARE | Facility: CLINIC | Age: 85
End: 2020-10-21
Payer: COMMERCIAL

## 2020-10-21 VITALS — TEMPERATURE: 99 F | OXYGEN SATURATION: 94 % | RESPIRATION RATE: 19 BRPM | HEART RATE: 102 BPM

## 2020-10-21 DIAGNOSIS — Z03.818 ENCNTR FOR OBS FOR SUSP EXPSR TO OTH BIOLG AGENTS RULED OUT: Primary | ICD-10-CM

## 2020-10-21 LAB
CTP QC/QA: YES
SARS-COV-2 RDRP RESP QL NAA+PROBE: NEGATIVE

## 2020-10-21 PROCEDURE — U0002: ICD-10-PCS | Mod: QW,S$GLB,, | Performed by: FAMILY MEDICINE

## 2020-10-21 PROCEDURE — U0002 COVID-19 LAB TEST NON-CDC: HCPCS | Mod: QW,S$GLB,, | Performed by: FAMILY MEDICINE

## 2020-10-27 ENCOUNTER — PATIENT OUTREACH (OUTPATIENT)
Dept: OTHER | Facility: OTHER | Age: 85
End: 2020-10-27

## 2020-10-27 DIAGNOSIS — I10 HYPERTENSION, UNSPECIFIED TYPE: Primary | Chronic | ICD-10-CM

## 2020-10-27 RX ORDER — VALSARTAN 320 MG/1
320 TABLET ORAL DAILY
Qty: 90 TABLET | Refills: 1 | Status: SHIPPED | OUTPATIENT
Start: 2020-10-27 | End: 2021-05-11

## 2020-10-27 NOTE — PROGRESS NOTES
Digital Medicine: Clinician Follow-Up    Called patient to discuss her elevated BP.    The history is provided by the patient.   Follow-up reason(s): routine follow up.     Hypertension    Readings are trending up due to lifestyle change and Patient ate out of town every night for the past 5 days..    Patient is not experiencing signs/symptoms of hypotension.  Patient is not experiencing signs/symptoms of hypertension.            Last 5 Patient Entered Readings                                      Current 30 Day Average: 151/77     Recent Readings 10/25/2020 10/25/2020 10/20/2020 10/18/2020 10/18/2020    SBP (mmHg) 162 132 145 150 159    DBP (mmHg) 74 67 80 79 78    Pulse 84 98 88 89 84                 Depression Screening  Did not address depression screening.    Sleep Apnea Screening    Did not address sleep apnea screening.     Medication Affordability Screening  Did not address medication affordability screening.     Medication Adherence-Medication adherence was assessed.          ASSESSMENT(S)  Patients BP average is 151/77 mmHg, which is above goal. Patient's BP goal is less than or equal to 140/90.     BP continues to climb over the past month. She was out of town and eating saltier options.       Hypertension Plan  Hypertension Medication Change. Change losartan 100mg daily to valsartan 320mg daily. Maintain HCTZ and amlodipine at their current doses.  Provided patient education. Discussed side effects with valsartan.       Addressed patient questions and patient has my contact information if needed prior to next outreach. Patient verbalizes understanding.             There are no preventive care reminders to display for this patient.  There are no preventive care reminders to display for this patient.      Hypertension Medications             amLODIPine (NORVASC) 10 MG tablet Take 1 tablet (10 mg total) by mouth once daily.    hydroCHLOROthiazide (HYDRODIURIL) 25 MG tablet Take 1 tablet (25 mg total) by  mouth once daily.    losartan (COZAAR) 100 MG tablet TAKE ONE TABLET BY MOUTH EVERY DAY.

## 2020-11-10 ENCOUNTER — PATIENT OUTREACH (OUTPATIENT)
Dept: OTHER | Facility: OTHER | Age: 85
End: 2020-11-10

## 2020-11-16 NOTE — PROGRESS NOTES
Digital Medicine: Health  Follow-Up    The history is provided by the patient.                 Patient needs assistance troubleshooting: patient reminder needed.    Additional Follow-up details: Following up about lack of BP readings.  States she went out of town and forgot her BP machine, but plans to start taking readings today.            Diet-Not assessed          Physical Activity-Not assessed    Medication Adherence-Medication Adherence not addressed.      Substance, Sleep, Stress-Not assessed      Additional monitoring needed.       Addressed patient questions and patient has my contact information if needed prior to next outreach. Patient verbalizes understanding.      Explained the importance of self-monitoring and medication adherence. Encouraged the patient to communicate with their health  for lifestyle modifications to help improve or maintain a healthy lifestyle.               There are no preventive care reminders to display for this patient.      Last 5 Patient Entered Readings                                      Current 30 Day Average: 148/74     Recent Readings 11/6/2020 11/3/2020 11/1/2020 10/31/2020 10/30/2020    SBP (mmHg) 124 165 160 133 130    DBP (mmHg) 65 79 76 66 71    Pulse 82 92 85 82 85

## 2020-11-18 ENCOUNTER — TELEPHONE (OUTPATIENT)
Dept: INTERNAL MEDICINE | Facility: CLINIC | Age: 85
End: 2020-11-18

## 2020-11-18 DIAGNOSIS — Z00.00 ANNUAL PHYSICAL EXAM: Primary | ICD-10-CM

## 2020-11-18 NOTE — TELEPHONE ENCOUNTER
----- Message from Naomy Acharya sent at 11/18/2020  2:21 PM CST -----  Doctor appointment and lab have been scheduled.  Please link lab orders to the lab appointment.  Date of doctor appointment:  4/28  Date of lab appointment:  4/21  Physical or F/U: 6 Month f/u  Comments:

## 2020-11-25 ENCOUNTER — TELEPHONE (OUTPATIENT)
Dept: INTERNAL MEDICINE | Facility: CLINIC | Age: 85
End: 2020-11-25

## 2020-12-11 ENCOUNTER — PATIENT OUTREACH (OUTPATIENT)
Dept: ADMINISTRATIVE | Facility: OTHER | Age: 85
End: 2020-12-11

## 2020-12-11 ENCOUNTER — OFFICE VISIT (OUTPATIENT)
Dept: DERMATOLOGY | Facility: CLINIC | Age: 85
End: 2020-12-11
Payer: COMMERCIAL

## 2020-12-11 DIAGNOSIS — Z12.83 SCREENING EXAM FOR SKIN CANCER: ICD-10-CM

## 2020-12-11 DIAGNOSIS — L57.0 AK (ACTINIC KERATOSIS): Primary | ICD-10-CM

## 2020-12-11 DIAGNOSIS — D22.9 MULTIPLE BENIGN NEVI: ICD-10-CM

## 2020-12-11 DIAGNOSIS — L72.0 MILIA: ICD-10-CM

## 2020-12-11 PROCEDURE — 1126F AMNT PAIN NOTED NONE PRSNT: CPT | Mod: S$GLB,,, | Performed by: DERMATOLOGY

## 2020-12-11 PROCEDURE — 17003 DESTRUCTION, PREMALIGNANT LESIONS; SECOND THROUGH 14 LESIONS: ICD-10-PCS | Mod: S$GLB,,, | Performed by: DERMATOLOGY

## 2020-12-11 PROCEDURE — 99999 PR PBB SHADOW E&M-EST. PATIENT-LVL III: ICD-10-PCS | Mod: PBBFAC,,, | Performed by: DERMATOLOGY

## 2020-12-11 PROCEDURE — 17003 DESTRUCT PREMALG LES 2-14: CPT | Mod: S$GLB,,, | Performed by: DERMATOLOGY

## 2020-12-11 PROCEDURE — 99999 PR PBB SHADOW E&M-EST. PATIENT-LVL III: CPT | Mod: PBBFAC,,, | Performed by: DERMATOLOGY

## 2020-12-11 PROCEDURE — 99202 PR OFFICE/OUTPT VISIT, NEW, LEVL II, 15-29 MIN: ICD-10-PCS | Mod: 25,S$GLB,, | Performed by: DERMATOLOGY

## 2020-12-11 PROCEDURE — 1157F ADVNC CARE PLAN IN RCRD: CPT | Mod: S$GLB,,, | Performed by: DERMATOLOGY

## 2020-12-11 PROCEDURE — 17000 DESTRUCT PREMALG LESION: CPT | Mod: S$GLB,,, | Performed by: DERMATOLOGY

## 2020-12-11 PROCEDURE — 1101F PR PT FALLS ASSESS DOC 0-1 FALLS W/OUT INJ PAST YR: ICD-10-PCS | Mod: CPTII,S$GLB,, | Performed by: DERMATOLOGY

## 2020-12-11 PROCEDURE — 1101F PT FALLS ASSESS-DOCD LE1/YR: CPT | Mod: CPTII,S$GLB,, | Performed by: DERMATOLOGY

## 2020-12-11 PROCEDURE — 1157F PR ADVANCE CARE PLAN OR EQUIV PRESENT IN MEDICAL RECORD: ICD-10-PCS | Mod: S$GLB,,, | Performed by: DERMATOLOGY

## 2020-12-11 PROCEDURE — 1126F PR PAIN SEVERITY QUANTIFIED, NO PAIN PRESENT: ICD-10-PCS | Mod: S$GLB,,, | Performed by: DERMATOLOGY

## 2020-12-11 PROCEDURE — 1159F PR MEDICATION LIST DOCUMENTED IN MEDICAL RECORD: ICD-10-PCS | Mod: S$GLB,,, | Performed by: DERMATOLOGY

## 2020-12-11 PROCEDURE — 3288F PR FALLS RISK ASSESSMENT DOCUMENTED: ICD-10-PCS | Mod: CPTII,S$GLB,, | Performed by: DERMATOLOGY

## 2020-12-11 PROCEDURE — 99202 OFFICE O/P NEW SF 15 MIN: CPT | Mod: 25,S$GLB,, | Performed by: DERMATOLOGY

## 2020-12-11 PROCEDURE — 17000 PR DESTRUCTION(LASER SURGERY,CRYOSURGERY,CHEMOSURGERY),PREMALIGNANT LESIONS,FIRST LESION: ICD-10-PCS | Mod: S$GLB,,, | Performed by: DERMATOLOGY

## 2020-12-11 PROCEDURE — 3288F FALL RISK ASSESSMENT DOCD: CPT | Mod: CPTII,S$GLB,, | Performed by: DERMATOLOGY

## 2020-12-11 PROCEDURE — 1159F MED LIST DOCD IN RCRD: CPT | Mod: S$GLB,,, | Performed by: DERMATOLOGY

## 2020-12-11 NOTE — PROGRESS NOTES
Subjective:       Patient ID:  Harriet Rutherford is a 85 y.o. female who presents for   Chief Complaint   Patient presents with    Skin Check     History of Present Illness: The patient presents for skin check.    Other skin complaints:   Patient with new complaint of lesion(s)  Location: nose  Duration: 2 months  Symptoms: darker, red, pink  Relieving factors/Previous treatments: none    No h/o NMSC          Review of Systems   Skin: Negative for daily sunscreen use, activity-related sunscreen use, recent sunburn and wears hat.   Hematologic/Lymphatic: Bruises/bleeds easily.        Objective:    Physical Exam   Constitutional: She appears well-developed and well-nourished. No distress.   Neurological: She is alert and oriented to person, place, and time. She is not disoriented.   Psychiatric: She has a normal mood and affect.   Skin:   Areas Examined (abnormalities noted in diagram):   Head / Face Inspection Performed  Neck Inspection Performed  Chest / Axilla Inspection Performed  Back Inspection Performed  RUE Inspected  LUE Inspection Performed                   Diagram Legend     Erythematous scaling macule/papule c/w actinic keratosis       Vascular papule c/w angioma      Pigmented verrucoid papule/plaque c/w seborrheic keratosis      Yellow umbilicated papule c/w sebaceous hyperplasia      Irregularly shaped tan macule c/w lentigo     1-2 mm smooth white papules consistent with Milia      Movable subcutaneous cyst with punctum c/w epidermal inclusion cyst      Subcutaneous movable cyst c/w pilar cyst      Firm pink to brown papule c/w dermatofibroma      Pedunculated fleshy papule(s) c/w skin tag(s)      Evenly pigmented macule c/w junctional nevus     Mildly variegated pigmented, slightly irregular-bordered macule c/w mildly atypical nevus      Flesh colored to evenly pigmented papule c/w intradermal nevus       Pink pearly papule/plaque c/w basal cell carcinoma      Erythematous hyperkeratotic cursted plaque  c/w SCC      Surgical scar with no sign of skin cancer recurrence      Open and closed comedones      Inflammatory papules and pustules      Verrucoid papule consistent consistent with wart     Erythematous eczematous patches and plaques     Dystrophic onycholytic nail with subungual debris c/w onychomycosis     Umbilicated papule    Erythematous-base heme-crusted tan verrucoid plaque consistent with inflamed seborrheic keratosis     Erythematous Silvery Scaling Plaque c/w Psoriasis     See annotation      Assessment / Plan:        AK (actinic keratosis)  Cryosurgery Procedure Note    Verbal consent from the patient is obtained including, but not limited to, risk of hypopigmentation/hyperpigmentation, scar, recurrence of lesion. The patient is aware of the precancerous quality and need for treatment of these lesions. Liquid nitrogen cryosurgery is applied to the 3 actinic keratoses, as detailed in the physical exam, to produce a freeze injury. The patient is aware that blisters may form and is instructed on wound care with gentle cleansing and use of vaseline ointment to keep moist until healed. The patient is supplied a handout on cryosurgery and is instructed to call if lesions do not completely resolve.      Multiple benign nevi  Upper body skin examination performed today including at least 6 points as noted in physical examination. No lesions suspicious for malignancy noted.  Reassurance provided.  Instructed patient to observe lesion(s) for changes and follow up in clinic if changes are noted. Discussed ABCDE's of moles and brochure provided.      Milia  This is a benign lesion. No further treatment is necessary.       Screening exam for skin cancer  Upper body skin examination performed today including at least 6 points as noted in physical examination. No lesions suspicious for malignancy noted.               Follow up in about 1 year (around 12/11/2021).

## 2020-12-11 NOTE — PATIENT INSTRUCTIONS

## 2020-12-15 ENCOUNTER — PATIENT MESSAGE (OUTPATIENT)
Dept: INTERNAL MEDICINE | Facility: CLINIC | Age: 85
End: 2020-12-15

## 2020-12-15 ENCOUNTER — PATIENT OUTREACH (OUTPATIENT)
Dept: OTHER | Facility: OTHER | Age: 85
End: 2020-12-15

## 2020-12-15 NOTE — PROGRESS NOTES
"Digital Medicine: Health  Follow-Up    The history is provided by the patient.             Reason for review: Blood pressure at goal      Additional Follow-up details: Patient reports she is feeling well.  Reports one occasion of lightheadedness, but called it a "fluke."             Diet-Not assessed          Physical Activity-Not assessed    Medication Adherence-Medication Adherence not addressed.      Substance, Sleep, Stress-Not assessed      Continue current diet/physical activity routine.       Addressed patient questions and patient has my contact information if needed prior to next outreach. Patient verbalizes understanding.      Explained the importance of self-monitoring and medication adherence. Encouraged the patient to communicate with their health  for lifestyle modifications to help improve or maintain a healthy lifestyle.               There are no preventive care reminders to display for this patient.      Last 5 Patient Entered Readings                                      Current 30 Day Average: 125/69     Recent Readings 12/13/2020 12/9/2020 12/9/2020 12/5/2020 12/4/2020    SBP (mmHg) 138 142 151 116 104    DBP (mmHg) 76 67 81 63 70    Pulse 84 86 89 93 83               "

## 2020-12-16 NOTE — TELEPHONE ENCOUNTER
Was this through her my [facility] portal? If so, she should be able to go to schedule appt. However, I am not aware of a patient able to get this if they are not an [employer] employee

## 2020-12-30 RX ORDER — LEVOTHYROXINE SODIUM 175 UG/1
TABLET ORAL
Qty: 30 TABLET | Refills: 0 | Status: SHIPPED | OUTPATIENT
Start: 2020-12-30 | End: 2021-01-19

## 2021-01-15 ENCOUNTER — IMMUNIZATION (OUTPATIENT)
Dept: INTERNAL MEDICINE | Facility: CLINIC | Age: 86
End: 2021-01-15
Payer: COMMERCIAL

## 2021-01-15 DIAGNOSIS — Z23 NEED FOR VACCINATION: Primary | ICD-10-CM

## 2021-01-15 PROCEDURE — 91300 COVID-19, MRNA, LNP-S, PF, 30 MCG/0.3 ML DOSE VACCINE: ICD-10-PCS | Mod: ,,, | Performed by: NURSE PRACTITIONER

## 2021-01-15 PROCEDURE — 91300 COVID-19, MRNA, LNP-S, PF, 30 MCG/0.3 ML DOSE VACCINE: CPT | Mod: ,,, | Performed by: NURSE PRACTITIONER

## 2021-01-15 PROCEDURE — 0001A COVID-19, MRNA, LNP-S, PF, 30 MCG/0.3 ML DOSE VACCINE: CPT | Mod: CV19,,, | Performed by: NURSE PRACTITIONER

## 2021-01-15 PROCEDURE — 0001A COVID-19, MRNA, LNP-S, PF, 30 MCG/0.3 ML DOSE VACCINE: ICD-10-PCS | Mod: CV19,,, | Performed by: NURSE PRACTITIONER

## 2021-01-16 DIAGNOSIS — E03.9 HYPOTHYROIDISM, UNSPECIFIED TYPE: ICD-10-CM

## 2021-01-19 RX ORDER — LEVOTHYROXINE SODIUM 150 UG/1
TABLET ORAL
Qty: 30 TABLET | Refills: 3 | Status: SHIPPED | OUTPATIENT
Start: 2021-01-19 | End: 2021-04-28

## 2021-02-02 ENCOUNTER — PATIENT MESSAGE (OUTPATIENT)
Dept: ADMINISTRATIVE | Facility: OTHER | Age: 86
End: 2021-02-02

## 2021-02-05 ENCOUNTER — IMMUNIZATION (OUTPATIENT)
Dept: INTERNAL MEDICINE | Facility: CLINIC | Age: 86
End: 2021-02-05
Payer: COMMERCIAL

## 2021-02-05 DIAGNOSIS — Z23 NEED FOR VACCINATION: Primary | ICD-10-CM

## 2021-02-05 PROCEDURE — 91300 COVID-19, MRNA, LNP-S, PF, 30 MCG/0.3 ML DOSE VACCINE: CPT | Mod: PBBFAC | Performed by: INTERNAL MEDICINE

## 2021-02-05 PROCEDURE — 0002A COVID-19, MRNA, LNP-S, PF, 30 MCG/0.3 ML DOSE VACCINE: CPT | Mod: PBBFAC | Performed by: INTERNAL MEDICINE

## 2021-03-01 ENCOUNTER — PATIENT MESSAGE (OUTPATIENT)
Dept: INTERNAL MEDICINE | Facility: CLINIC | Age: 86
End: 2021-03-01

## 2021-03-01 DIAGNOSIS — R41.3 MEMORY CHANGE: Primary | ICD-10-CM

## 2021-04-01 ENCOUNTER — LAB VISIT (OUTPATIENT)
Dept: LAB | Facility: HOSPITAL | Age: 86
End: 2021-04-01
Attending: PSYCHIATRY & NEUROLOGY
Payer: COMMERCIAL

## 2021-04-01 ENCOUNTER — PATIENT MESSAGE (OUTPATIENT)
Dept: NEUROLOGY | Facility: CLINIC | Age: 86
End: 2021-04-01

## 2021-04-01 ENCOUNTER — TELEPHONE (OUTPATIENT)
Dept: NEUROLOGY | Facility: CLINIC | Age: 86
End: 2021-04-01

## 2021-04-01 ENCOUNTER — OFFICE VISIT (OUTPATIENT)
Dept: NEUROLOGY | Facility: CLINIC | Age: 86
End: 2021-04-01
Payer: COMMERCIAL

## 2021-04-01 VITALS
WEIGHT: 217.38 LBS | HEIGHT: 66 IN | HEART RATE: 86 BPM | BODY MASS INDEX: 34.93 KG/M2 | SYSTOLIC BLOOD PRESSURE: 120 MMHG | DIASTOLIC BLOOD PRESSURE: 67 MMHG

## 2021-04-01 DIAGNOSIS — R41.82 ALTERED MENTAL STATUS, UNSPECIFIED ALTERED MENTAL STATUS TYPE: ICD-10-CM

## 2021-04-01 DIAGNOSIS — R41.3 OTHER AMNESIA: ICD-10-CM

## 2021-04-01 DIAGNOSIS — R41.3 MEMORY CHANGE: ICD-10-CM

## 2021-04-01 DIAGNOSIS — R41.89 COGNITIVE IMPAIRMENT: ICD-10-CM

## 2021-04-01 DIAGNOSIS — H91.90 HEARING LOSS, UNSPECIFIED HEARING LOSS TYPE, UNSPECIFIED LATERALITY: Primary | ICD-10-CM

## 2021-04-01 LAB
T4 FREE SERPL-MCNC: 1.13 NG/DL (ref 0.71–1.51)
TSH SERPL DL<=0.005 MIU/L-ACNC: 6.1 UIU/ML (ref 0.4–4)

## 2021-04-01 PROCEDURE — 1157F PR ADVANCE CARE PLAN OR EQUIV PRESENT IN MEDICAL RECORD: ICD-10-PCS | Mod: S$GLB,,, | Performed by: PSYCHIATRY & NEUROLOGY

## 2021-04-01 PROCEDURE — 36415 COLL VENOUS BLD VENIPUNCTURE: CPT | Performed by: PSYCHIATRY & NEUROLOGY

## 2021-04-01 PROCEDURE — 1100F PR PT FALLS ASSESS DOC 2+ FALLS/FALL W/INJURY/YR: ICD-10-PCS | Mod: CPTII,S$GLB,, | Performed by: PSYCHIATRY & NEUROLOGY

## 2021-04-01 PROCEDURE — 99999 PR PBB SHADOW E&M-EST. PATIENT-LVL V: ICD-10-PCS | Mod: PBBFAC,,, | Performed by: PSYCHIATRY & NEUROLOGY

## 2021-04-01 PROCEDURE — 84439 ASSAY OF FREE THYROXINE: CPT | Performed by: PSYCHIATRY & NEUROLOGY

## 2021-04-01 PROCEDURE — 3074F SYST BP LT 130 MM HG: CPT | Mod: CPTII,S$GLB,, | Performed by: PSYCHIATRY & NEUROLOGY

## 2021-04-01 PROCEDURE — 3288F PR FALLS RISK ASSESSMENT DOCUMENTED: ICD-10-PCS | Mod: CPTII,S$GLB,, | Performed by: PSYCHIATRY & NEUROLOGY

## 2021-04-01 PROCEDURE — 3074F PR MOST RECENT SYSTOLIC BLOOD PRESSURE < 130 MM HG: ICD-10-PCS | Mod: CPTII,S$GLB,, | Performed by: PSYCHIATRY & NEUROLOGY

## 2021-04-01 PROCEDURE — 3288F FALL RISK ASSESSMENT DOCD: CPT | Mod: CPTII,S$GLB,, | Performed by: PSYCHIATRY & NEUROLOGY

## 2021-04-01 PROCEDURE — 84425 ASSAY OF VITAMIN B-1: CPT | Performed by: PSYCHIATRY & NEUROLOGY

## 2021-04-01 PROCEDURE — 82607 VITAMIN B-12: CPT | Performed by: PSYCHIATRY & NEUROLOGY

## 2021-04-01 PROCEDURE — 1159F MED LIST DOCD IN RCRD: CPT | Mod: S$GLB,,, | Performed by: PSYCHIATRY & NEUROLOGY

## 2021-04-01 PROCEDURE — 1126F AMNT PAIN NOTED NONE PRSNT: CPT | Mod: S$GLB,,, | Performed by: PSYCHIATRY & NEUROLOGY

## 2021-04-01 PROCEDURE — 1159F PR MEDICATION LIST DOCUMENTED IN MEDICAL RECORD: ICD-10-PCS | Mod: S$GLB,,, | Performed by: PSYCHIATRY & NEUROLOGY

## 2021-04-01 PROCEDURE — 84443 ASSAY THYROID STIM HORMONE: CPT | Performed by: PSYCHIATRY & NEUROLOGY

## 2021-04-01 PROCEDURE — 3078F PR MOST RECENT DIASTOLIC BLOOD PRESSURE < 80 MM HG: ICD-10-PCS | Mod: CPTII,S$GLB,, | Performed by: PSYCHIATRY & NEUROLOGY

## 2021-04-01 PROCEDURE — 86592 SYPHILIS TEST NON-TREP QUAL: CPT | Performed by: PSYCHIATRY & NEUROLOGY

## 2021-04-01 PROCEDURE — 1126F PR PAIN SEVERITY QUANTIFIED, NO PAIN PRESENT: ICD-10-PCS | Mod: S$GLB,,, | Performed by: PSYCHIATRY & NEUROLOGY

## 2021-04-01 PROCEDURE — 83921 ORGANIC ACID SINGLE QUANT: CPT | Performed by: PSYCHIATRY & NEUROLOGY

## 2021-04-01 PROCEDURE — 99999 PR PBB SHADOW E&M-EST. PATIENT-LVL V: CPT | Mod: PBBFAC,,, | Performed by: PSYCHIATRY & NEUROLOGY

## 2021-04-01 PROCEDURE — 1157F ADVNC CARE PLAN IN RCRD: CPT | Mod: S$GLB,,, | Performed by: PSYCHIATRY & NEUROLOGY

## 2021-04-01 PROCEDURE — 99205 OFFICE O/P NEW HI 60 MIN: CPT | Mod: S$GLB,,, | Performed by: PSYCHIATRY & NEUROLOGY

## 2021-04-01 PROCEDURE — 3078F DIAST BP <80 MM HG: CPT | Mod: CPTII,S$GLB,, | Performed by: PSYCHIATRY & NEUROLOGY

## 2021-04-01 PROCEDURE — 99205 PR OFFICE/OUTPT VISIT, NEW, LEVL V, 60-74 MIN: ICD-10-PCS | Mod: S$GLB,,, | Performed by: PSYCHIATRY & NEUROLOGY

## 2021-04-01 PROCEDURE — 1100F PTFALLS ASSESS-DOCD GE2>/YR: CPT | Mod: CPTII,S$GLB,, | Performed by: PSYCHIATRY & NEUROLOGY

## 2021-04-01 RX ORDER — DONEPEZIL HYDROCHLORIDE 5 MG/1
5 TABLET, FILM COATED ORAL NIGHTLY
Qty: 90 TABLET | Refills: 3 | Status: SHIPPED | OUTPATIENT
Start: 2021-04-01 | End: 2022-09-27 | Stop reason: SDUPTHER

## 2021-04-02 LAB
RPR SER QL: NORMAL
VIT B12 SERPL-MCNC: 555 PG/ML (ref 210–950)

## 2021-04-06 LAB — METHYLMALONATE SERPL-SCNC: 0.32 UMOL/L

## 2021-04-08 LAB — VIT B1 BLD-MCNC: 68 UG/L (ref 38–122)

## 2021-04-14 ENCOUNTER — HOSPITAL ENCOUNTER (OUTPATIENT)
Dept: RADIOLOGY | Facility: HOSPITAL | Age: 86
Discharge: HOME | End: 2021-04-14
Attending: PSYCHIATRY & NEUROLOGY
Payer: COMMERCIAL

## 2021-04-14 DIAGNOSIS — R41.3 MEMORY CHANGE: ICD-10-CM

## 2021-04-14 DIAGNOSIS — R41.3 OTHER AMNESIA: ICD-10-CM

## 2021-04-14 DIAGNOSIS — R41.82 ALTERED MENTAL STATUS, UNSPECIFIED ALTERED MENTAL STATUS TYPE: ICD-10-CM

## 2021-04-14 PROCEDURE — 70551 MRI BRAIN STEM W/O DYE: CPT | Mod: 26,,, | Performed by: RADIOLOGY

## 2021-04-14 PROCEDURE — 70551 MRI BRAIN STEM W/O DYE: CPT | Mod: TC

## 2021-04-14 PROCEDURE — 70551 MRI BRAIN WITHOUT CONTRAST: ICD-10-PCS | Mod: 26,,, | Performed by: RADIOLOGY

## 2021-04-20 ENCOUNTER — LAB VISIT (OUTPATIENT)
Dept: LAB | Facility: HOSPITAL | Age: 86
End: 2021-04-20
Attending: INTERNAL MEDICINE
Payer: COMMERCIAL

## 2021-04-20 DIAGNOSIS — I10 HYPERTENSION, UNSPECIFIED TYPE: ICD-10-CM

## 2021-04-20 DIAGNOSIS — Z00.00 ANNUAL PHYSICAL EXAM: ICD-10-CM

## 2021-04-20 DIAGNOSIS — E03.9 HYPOTHYROIDISM, UNSPECIFIED TYPE: ICD-10-CM

## 2021-04-20 DIAGNOSIS — E78.5 HYPERLIPIDEMIA, UNSPECIFIED HYPERLIPIDEMIA TYPE: ICD-10-CM

## 2021-04-20 LAB
25(OH)D3+25(OH)D2 SERPL-MCNC: 35 NG/ML (ref 30–96)
ALBUMIN SERPL BCP-MCNC: 3.9 G/DL (ref 3.5–5.2)
ALP SERPL-CCNC: 82 U/L (ref 55–135)
ALT SERPL W/O P-5'-P-CCNC: 34 U/L (ref 10–44)
ANION GAP SERPL CALC-SCNC: 11 MMOL/L (ref 8–16)
AST SERPL-CCNC: 29 U/L (ref 10–40)
BASOPHILS # BLD AUTO: 0.03 K/UL (ref 0–0.2)
BASOPHILS NFR BLD: 0.5 % (ref 0–1.9)
BILIRUB SERPL-MCNC: 0.4 MG/DL (ref 0.1–1)
BUN SERPL-MCNC: 34 MG/DL (ref 8–23)
CALCIUM SERPL-MCNC: 9.4 MG/DL (ref 8.7–10.5)
CHLORIDE SERPL-SCNC: 103 MMOL/L (ref 95–110)
CHOLEST SERPL-MCNC: 197 MG/DL (ref 120–199)
CHOLEST/HDLC SERPL: 3.4 {RATIO} (ref 2–5)
CO2 SERPL-SCNC: 24 MMOL/L (ref 23–29)
CREAT SERPL-MCNC: 1.4 MG/DL (ref 0.5–1.4)
DIFFERENTIAL METHOD: ABNORMAL
EOSINOPHIL # BLD AUTO: 0.2 K/UL (ref 0–0.5)
EOSINOPHIL NFR BLD: 3.5 % (ref 0–8)
ERYTHROCYTE [DISTWIDTH] IN BLOOD BY AUTOMATED COUNT: 12.7 % (ref 11.5–14.5)
EST. GFR  (AFRICAN AMERICAN): 40.1 ML/MIN/1.73 M^2
EST. GFR  (NON AFRICAN AMERICAN): 34.8 ML/MIN/1.73 M^2
GLUCOSE SERPL-MCNC: 108 MG/DL (ref 70–110)
HCT VFR BLD AUTO: 40.8 % (ref 37–48.5)
HDLC SERPL-MCNC: 58 MG/DL (ref 40–75)
HDLC SERPL: 29.4 % (ref 20–50)
HGB BLD-MCNC: 12.9 G/DL (ref 12–16)
IMM GRANULOCYTES # BLD AUTO: 0.02 K/UL (ref 0–0.04)
IMM GRANULOCYTES NFR BLD AUTO: 0.3 % (ref 0–0.5)
LDLC SERPL CALC-MCNC: 108 MG/DL (ref 63–159)
LYMPHOCYTES # BLD AUTO: 1.3 K/UL (ref 1–4.8)
LYMPHOCYTES NFR BLD: 19.7 % (ref 18–48)
MCH RBC QN AUTO: 31 PG (ref 27–31)
MCHC RBC AUTO-ENTMCNC: 31.6 G/DL (ref 32–36)
MCV RBC AUTO: 98 FL (ref 82–98)
MONOCYTES # BLD AUTO: 0.8 K/UL (ref 0.3–1)
MONOCYTES NFR BLD: 12.1 % (ref 4–15)
NEUTROPHILS # BLD AUTO: 4.2 K/UL (ref 1.8–7.7)
NEUTROPHILS NFR BLD: 63.9 % (ref 38–73)
NONHDLC SERPL-MCNC: 139 MG/DL
NRBC BLD-RTO: 0 /100 WBC
PLATELET # BLD AUTO: 369 K/UL (ref 150–450)
PMV BLD AUTO: 10.3 FL (ref 9.2–12.9)
POTASSIUM SERPL-SCNC: 3.9 MMOL/L (ref 3.5–5.1)
PROT SERPL-MCNC: 7 G/DL (ref 6–8.4)
RBC # BLD AUTO: 4.16 M/UL (ref 4–5.4)
SODIUM SERPL-SCNC: 138 MMOL/L (ref 136–145)
T4 FREE SERPL-MCNC: 1.11 NG/DL (ref 0.71–1.51)
TRIGL SERPL-MCNC: 155 MG/DL (ref 30–150)
TSH SERPL DL<=0.005 MIU/L-ACNC: 4.91 UIU/ML (ref 0.4–4)
WBC # BLD AUTO: 6.54 K/UL (ref 3.9–12.7)

## 2021-04-20 PROCEDURE — 84439 ASSAY OF FREE THYROXINE: CPT | Performed by: INTERNAL MEDICINE

## 2021-04-20 PROCEDURE — 84443 ASSAY THYROID STIM HORMONE: CPT | Performed by: INTERNAL MEDICINE

## 2021-04-20 PROCEDURE — 36415 COLL VENOUS BLD VENIPUNCTURE: CPT | Performed by: INTERNAL MEDICINE

## 2021-04-20 PROCEDURE — 80053 COMPREHEN METABOLIC PANEL: CPT | Performed by: INTERNAL MEDICINE

## 2021-04-20 PROCEDURE — 82306 VITAMIN D 25 HYDROXY: CPT | Performed by: INTERNAL MEDICINE

## 2021-04-20 PROCEDURE — 80061 LIPID PANEL: CPT | Performed by: INTERNAL MEDICINE

## 2021-04-20 PROCEDURE — 85025 COMPLETE CBC W/AUTO DIFF WBC: CPT | Performed by: INTERNAL MEDICINE

## 2021-04-21 ENCOUNTER — OFFICE VISIT (OUTPATIENT)
Dept: NEUROLOGY | Facility: CLINIC | Age: 86
End: 2021-04-21
Payer: COMMERCIAL

## 2021-04-21 DIAGNOSIS — R41.3 MEMORY CHANGE: ICD-10-CM

## 2021-04-21 PROCEDURE — 99499 NO LOS: ICD-10-PCS | Mod: 95,,, | Performed by: CLINICAL NEUROPSYCHOLOGIST

## 2021-04-21 PROCEDURE — 96116 PR NEUROBEHAVIORAL STATUS EXAM BY PSYCH/PHYS: ICD-10-PCS | Mod: 95,,, | Performed by: CLINICAL NEUROPSYCHOLOGIST

## 2021-04-21 PROCEDURE — 99499 UNLISTED E&M SERVICE: CPT | Mod: 95,,, | Performed by: CLINICAL NEUROPSYCHOLOGIST

## 2021-04-21 PROCEDURE — 96116 NUBHVL XM PHYS/QHP 1ST HR: CPT | Mod: 95,,, | Performed by: CLINICAL NEUROPSYCHOLOGIST

## 2021-04-26 PROBLEM — R41.3 MEMORY CHANGE: Status: ACTIVE | Noted: 2021-04-26

## 2021-04-28 ENCOUNTER — HOSPITAL ENCOUNTER (OUTPATIENT)
Dept: RADIOLOGY | Facility: HOSPITAL | Age: 86
Discharge: HOME | End: 2021-04-28
Attending: INTERNAL MEDICINE
Payer: COMMERCIAL

## 2021-04-28 ENCOUNTER — PATIENT MESSAGE (OUTPATIENT)
Dept: INTERNAL MEDICINE | Facility: CLINIC | Age: 86
End: 2021-04-28

## 2021-04-28 ENCOUNTER — OFFICE VISIT (OUTPATIENT)
Dept: INTERNAL MEDICINE | Facility: CLINIC | Age: 86
End: 2021-04-28
Payer: COMMERCIAL

## 2021-04-28 VITALS
HEART RATE: 80 BPM | DIASTOLIC BLOOD PRESSURE: 64 MMHG | HEIGHT: 67 IN | WEIGHT: 215.38 LBS | BODY MASS INDEX: 33.8 KG/M2 | SYSTOLIC BLOOD PRESSURE: 100 MMHG | TEMPERATURE: 98 F | RESPIRATION RATE: 16 BRPM

## 2021-04-28 DIAGNOSIS — N32.81 URGENCY-FREQUENCY SYNDROME: ICD-10-CM

## 2021-04-28 DIAGNOSIS — F41.9 ANXIETY AND DEPRESSION: ICD-10-CM

## 2021-04-28 DIAGNOSIS — M54.9 DORSALGIA, UNSPECIFIED: ICD-10-CM

## 2021-04-28 DIAGNOSIS — Z12.31 VISIT FOR SCREENING MAMMOGRAM: ICD-10-CM

## 2021-04-28 DIAGNOSIS — N18.30 STAGE 3 CHRONIC KIDNEY DISEASE, UNSPECIFIED WHETHER STAGE 3A OR 3B CKD: ICD-10-CM

## 2021-04-28 DIAGNOSIS — E78.5 HYPERLIPIDEMIA, UNSPECIFIED HYPERLIPIDEMIA TYPE: Primary | ICD-10-CM

## 2021-04-28 DIAGNOSIS — G47.00 INSOMNIA, UNSPECIFIED TYPE: ICD-10-CM

## 2021-04-28 DIAGNOSIS — F32.A ANXIETY AND DEPRESSION: ICD-10-CM

## 2021-04-28 DIAGNOSIS — R26.89 BALANCE DISORDER: ICD-10-CM

## 2021-04-28 DIAGNOSIS — E03.9 HYPOTHYROIDISM, UNSPECIFIED TYPE: ICD-10-CM

## 2021-04-28 DIAGNOSIS — Z12.11 COLON CANCER SCREENING: ICD-10-CM

## 2021-04-28 DIAGNOSIS — I73.9 PAD (PERIPHERAL ARTERY DISEASE): ICD-10-CM

## 2021-04-28 PROCEDURE — 3288F PR FALLS RISK ASSESSMENT DOCUMENTED: ICD-10-PCS | Mod: CPTII,S$GLB,, | Performed by: INTERNAL MEDICINE

## 2021-04-28 PROCEDURE — 3288F FALL RISK ASSESSMENT DOCD: CPT | Mod: CPTII,S$GLB,, | Performed by: INTERNAL MEDICINE

## 2021-04-28 PROCEDURE — 72110 X-RAY EXAM L-2 SPINE 4/>VWS: CPT | Mod: TC,PO

## 2021-04-28 PROCEDURE — 72110 X-RAY EXAM L-2 SPINE 4/>VWS: CPT | Mod: 26,,, | Performed by: RADIOLOGY

## 2021-04-28 PROCEDURE — 1100F PR PT FALLS ASSESS DOC 2+ FALLS/FALL W/INJURY/YR: ICD-10-PCS | Mod: CPTII,S$GLB,, | Performed by: INTERNAL MEDICINE

## 2021-04-28 PROCEDURE — 1100F PTFALLS ASSESS-DOCD GE2>/YR: CPT | Mod: CPTII,S$GLB,, | Performed by: INTERNAL MEDICINE

## 2021-04-28 PROCEDURE — 72110 XR LUMBAR SPINE COMPLETE 5 VIEW: ICD-10-PCS | Mod: 26,,, | Performed by: RADIOLOGY

## 2021-04-28 PROCEDURE — 99214 OFFICE O/P EST MOD 30 MIN: CPT | Mod: S$GLB,,, | Performed by: INTERNAL MEDICINE

## 2021-04-28 PROCEDURE — 99214 PR OFFICE/OUTPT VISIT, EST, LEVL IV, 30-39 MIN: ICD-10-PCS | Mod: S$GLB,,, | Performed by: INTERNAL MEDICINE

## 2021-04-28 PROCEDURE — 99999 PR PBB SHADOW E&M-EST. PATIENT-LVL V: CPT | Mod: PBBFAC,,, | Performed by: INTERNAL MEDICINE

## 2021-04-28 PROCEDURE — 1159F PR MEDICATION LIST DOCUMENTED IN MEDICAL RECORD: ICD-10-PCS | Mod: S$GLB,,, | Performed by: INTERNAL MEDICINE

## 2021-04-28 PROCEDURE — 99999 PR PBB SHADOW E&M-EST. PATIENT-LVL V: ICD-10-PCS | Mod: PBBFAC,,, | Performed by: INTERNAL MEDICINE

## 2021-04-28 PROCEDURE — 1125F PR PAIN SEVERITY QUANTIFIED, PAIN PRESENT: ICD-10-PCS | Mod: S$GLB,,, | Performed by: INTERNAL MEDICINE

## 2021-04-28 PROCEDURE — 1157F PR ADVANCE CARE PLAN OR EQUIV PRESENT IN MEDICAL RECORD: ICD-10-PCS | Mod: S$GLB,,, | Performed by: INTERNAL MEDICINE

## 2021-04-28 PROCEDURE — 1159F MED LIST DOCD IN RCRD: CPT | Mod: S$GLB,,, | Performed by: INTERNAL MEDICINE

## 2021-04-28 PROCEDURE — 1125F AMNT PAIN NOTED PAIN PRSNT: CPT | Mod: S$GLB,,, | Performed by: INTERNAL MEDICINE

## 2021-04-28 PROCEDURE — 1157F ADVNC CARE PLAN IN RCRD: CPT | Mod: S$GLB,,, | Performed by: INTERNAL MEDICINE

## 2021-04-28 RX ORDER — LEVOTHYROXINE SODIUM 200 UG/1
200 TABLET ORAL
Qty: 90 TABLET | Refills: 3 | Status: SHIPPED | OUTPATIENT
Start: 2021-04-28 | End: 2022-04-28

## 2021-04-28 RX ORDER — DICLOFENAC SODIUM 30 MG/G
GEL TOPICAL
Qty: 100 G | Refills: 3 | Status: SHIPPED | OUTPATIENT
Start: 2021-04-28

## 2021-04-28 RX ORDER — OXYBUTYNIN CHLORIDE 10 MG/1
10 TABLET, EXTENDED RELEASE ORAL DAILY
Qty: 90 TABLET | Refills: 3 | Status: SHIPPED | OUTPATIENT
Start: 2021-04-28 | End: 2021-08-25

## 2021-04-28 RX ORDER — ZOSTER VACCINE RECOMBINANT, ADJUVANTED 50 MCG/0.5
0.5 KIT INTRAMUSCULAR ONCE
Qty: 1 EACH | Refills: 1 | Status: SHIPPED | OUTPATIENT
Start: 2021-04-28 | End: 2021-04-28

## 2021-04-28 RX ORDER — AMOXICILLIN AND CLAVULANATE POTASSIUM 875; 125 MG/1; MG/1
1 TABLET, FILM COATED ORAL 2 TIMES DAILY
Qty: 14 TABLET | Refills: 0 | Status: SHIPPED | OUTPATIENT
Start: 2021-04-28 | End: 2021-05-05

## 2021-04-28 RX ORDER — TEMAZEPAM 30 MG/1
30 CAPSULE ORAL NIGHTLY
Qty: 90 CAPSULE | Refills: 0 | Status: SHIPPED | OUTPATIENT
Start: 2021-04-28 | End: 2021-08-01

## 2021-05-03 ENCOUNTER — CLINICAL SUPPORT (OUTPATIENT)
Dept: REHABILITATION | Facility: HOSPITAL | Age: 86
End: 2021-05-03
Attending: INTERNAL MEDICINE
Payer: COMMERCIAL

## 2021-05-03 DIAGNOSIS — R26.89 BALANCE DISORDER: ICD-10-CM

## 2021-05-03 DIAGNOSIS — M54.9 DORSALGIA, UNSPECIFIED: ICD-10-CM

## 2021-05-03 DIAGNOSIS — R53.1 DECREASED STRENGTH: ICD-10-CM

## 2021-05-03 DIAGNOSIS — R26.9 IMPAIRED GAIT: ICD-10-CM

## 2021-05-03 DIAGNOSIS — M54.50 LUMBAR PAIN: ICD-10-CM

## 2021-05-03 PROCEDURE — 97161 PT EVAL LOW COMPLEX 20 MIN: CPT

## 2021-05-11 ENCOUNTER — CLINICAL SUPPORT (OUTPATIENT)
Dept: AUDIOLOGY | Facility: CLINIC | Age: 86
End: 2021-05-11
Payer: COMMERCIAL

## 2021-05-11 DIAGNOSIS — I10 HYPERTENSION, UNSPECIFIED TYPE: Chronic | ICD-10-CM

## 2021-05-11 DIAGNOSIS — H91.90 HEARING LOSS, UNSPECIFIED HEARING LOSS TYPE, UNSPECIFIED LATERALITY: ICD-10-CM

## 2021-05-11 DIAGNOSIS — R41.89 COGNITIVE IMPAIRMENT: ICD-10-CM

## 2021-05-11 DIAGNOSIS — H90.3 SENSORINEURAL HEARING LOSS, BILATERAL: Primary | ICD-10-CM

## 2021-05-11 PROCEDURE — 99999 PR PBB SHADOW E&M-EST. PATIENT-LVL I: ICD-10-PCS | Mod: PBBFAC,,, | Performed by: AUDIOLOGIST

## 2021-05-11 PROCEDURE — 92567 TYMPANOMETRY: CPT | Mod: S$GLB,,, | Performed by: AUDIOLOGIST

## 2021-05-11 PROCEDURE — 92557 PR COMPREHENSIVE HEARING TEST: ICD-10-PCS | Mod: S$GLB,,, | Performed by: AUDIOLOGIST

## 2021-05-11 PROCEDURE — 99999 PR PBB SHADOW E&M-EST. PATIENT-LVL I: CPT | Mod: PBBFAC,,, | Performed by: AUDIOLOGIST

## 2021-05-11 PROCEDURE — 92567 PR TYMPA2METRY: ICD-10-PCS | Mod: S$GLB,,, | Performed by: AUDIOLOGIST

## 2021-05-11 PROCEDURE — 92557 COMPREHENSIVE HEARING TEST: CPT | Mod: S$GLB,,, | Performed by: AUDIOLOGIST

## 2021-05-11 RX ORDER — VALSARTAN 320 MG/1
TABLET ORAL
Qty: 90 TABLET | Refills: 0 | Status: SHIPPED | OUTPATIENT
Start: 2021-05-11 | End: 2021-09-19

## 2021-05-17 ENCOUNTER — OFFICE VISIT (OUTPATIENT)
Dept: NEUROLOGY | Facility: CLINIC | Age: 86
End: 2021-05-17
Payer: COMMERCIAL

## 2021-05-17 DIAGNOSIS — R41.3 MEMORY CHANGE: Primary | ICD-10-CM

## 2021-05-17 DIAGNOSIS — F32.A DEPRESSION, UNSPECIFIED DEPRESSION TYPE: ICD-10-CM

## 2021-05-17 PROCEDURE — 96132 PR NEUROPSYCHOLOGIC TEST EVAL SVCS, 1ST HR: ICD-10-PCS | Mod: S$GLB,,, | Performed by: CLINICAL NEUROPSYCHOLOGIST

## 2021-05-17 PROCEDURE — 96133 NRPSYC TST EVAL PHYS/QHP EA: CPT | Mod: S$GLB,,, | Performed by: CLINICAL NEUROPSYCHOLOGIST

## 2021-05-17 PROCEDURE — 96133 PR NEUROPSYCHOLOGIC TEST EVAL SVCS, EA ADDTL HR: ICD-10-PCS | Mod: S$GLB,,, | Performed by: CLINICAL NEUROPSYCHOLOGIST

## 2021-05-17 PROCEDURE — 96139 PSYCL/NRPSYC TST TECH EA: CPT | Mod: S$GLB,,, | Performed by: CLINICAL NEUROPSYCHOLOGIST

## 2021-05-17 PROCEDURE — 99499 UNLISTED E&M SERVICE: CPT | Mod: S$GLB,,, | Performed by: CLINICAL NEUROPSYCHOLOGIST

## 2021-05-17 PROCEDURE — 96139 PR PSYCH/NEUROPSYCH TEST ADMIN/SCORING, BY TECH, 2+ TESTS, EA ADDTL 30 MIN: ICD-10-PCS | Mod: S$GLB,,, | Performed by: CLINICAL NEUROPSYCHOLOGIST

## 2021-05-17 PROCEDURE — 96132 NRPSYC TST EVAL PHYS/QHP 1ST: CPT | Mod: S$GLB,,, | Performed by: CLINICAL NEUROPSYCHOLOGIST

## 2021-05-17 PROCEDURE — 1157F PR ADVANCE CARE PLAN OR EQUIV PRESENT IN MEDICAL RECORD: ICD-10-PCS | Mod: S$GLB,,, | Performed by: CLINICAL NEUROPSYCHOLOGIST

## 2021-05-17 PROCEDURE — 99999 PR PBB SHADOW E&M-EST. PATIENT-LVL I: ICD-10-PCS | Mod: PBBFAC,,, | Performed by: CLINICAL NEUROPSYCHOLOGIST

## 2021-05-17 PROCEDURE — 96138 PR PSYCH/NEUROPSYCH TEST ADMIN/SCORING, BY TECH, 2+ TESTS, 1ST 30 MIN: ICD-10-PCS | Mod: S$GLB,,, | Performed by: CLINICAL NEUROPSYCHOLOGIST

## 2021-05-17 PROCEDURE — 99999 PR PBB SHADOW E&M-EST. PATIENT-LVL I: CPT | Mod: PBBFAC,,, | Performed by: CLINICAL NEUROPSYCHOLOGIST

## 2021-05-17 PROCEDURE — 96138 PSYCL/NRPSYC TECH 1ST: CPT | Mod: S$GLB,,, | Performed by: CLINICAL NEUROPSYCHOLOGIST

## 2021-05-17 PROCEDURE — 99499 NO LOS: ICD-10-PCS | Mod: S$GLB,,, | Performed by: CLINICAL NEUROPSYCHOLOGIST

## 2021-05-17 PROCEDURE — 1157F ADVNC CARE PLAN IN RCRD: CPT | Mod: S$GLB,,, | Performed by: CLINICAL NEUROPSYCHOLOGIST

## 2021-05-18 ENCOUNTER — CLINICAL SUPPORT (OUTPATIENT)
Dept: REHABILITATION | Facility: HOSPITAL | Age: 86
End: 2021-05-18
Attending: INTERNAL MEDICINE
Payer: COMMERCIAL

## 2021-05-18 DIAGNOSIS — R53.1 DECREASED STRENGTH: ICD-10-CM

## 2021-05-18 DIAGNOSIS — M54.50 LUMBAR PAIN: ICD-10-CM

## 2021-05-18 DIAGNOSIS — R26.9 IMPAIRED GAIT: ICD-10-CM

## 2021-05-18 PROCEDURE — 97110 THERAPEUTIC EXERCISES: CPT

## 2021-05-18 PROCEDURE — 97116 GAIT TRAINING THERAPY: CPT

## 2021-05-20 ENCOUNTER — CLINICAL SUPPORT (OUTPATIENT)
Dept: REHABILITATION | Facility: HOSPITAL | Age: 86
End: 2021-05-20
Attending: INTERNAL MEDICINE
Payer: COMMERCIAL

## 2021-05-20 DIAGNOSIS — M54.50 LUMBAR PAIN: ICD-10-CM

## 2021-05-20 DIAGNOSIS — R26.9 IMPAIRED GAIT: ICD-10-CM

## 2021-05-20 DIAGNOSIS — R53.1 DECREASED STRENGTH: ICD-10-CM

## 2021-05-20 PROCEDURE — 97112 NEUROMUSCULAR REEDUCATION: CPT

## 2021-05-20 PROCEDURE — 97110 THERAPEUTIC EXERCISES: CPT

## 2021-05-24 ENCOUNTER — CLINICAL SUPPORT (OUTPATIENT)
Dept: REHABILITATION | Facility: HOSPITAL | Age: 86
End: 2021-05-24
Attending: INTERNAL MEDICINE
Payer: COMMERCIAL

## 2021-05-24 DIAGNOSIS — R26.9 IMPAIRED GAIT: ICD-10-CM

## 2021-05-24 DIAGNOSIS — R53.1 DECREASED STRENGTH: ICD-10-CM

## 2021-05-24 DIAGNOSIS — M54.50 LUMBAR PAIN: ICD-10-CM

## 2021-05-24 PROCEDURE — 97110 THERAPEUTIC EXERCISES: CPT | Mod: CQ

## 2021-05-24 PROCEDURE — 97112 NEUROMUSCULAR REEDUCATION: CPT | Mod: CQ

## 2021-05-26 ENCOUNTER — CLINICAL SUPPORT (OUTPATIENT)
Dept: REHABILITATION | Facility: HOSPITAL | Age: 86
End: 2021-05-26
Attending: INTERNAL MEDICINE
Payer: COMMERCIAL

## 2021-05-26 DIAGNOSIS — R53.1 DECREASED STRENGTH: ICD-10-CM

## 2021-05-26 DIAGNOSIS — R26.9 IMPAIRED GAIT: ICD-10-CM

## 2021-05-26 DIAGNOSIS — M54.50 LUMBAR PAIN: ICD-10-CM

## 2021-05-26 PROCEDURE — 97112 NEUROMUSCULAR REEDUCATION: CPT | Mod: CQ

## 2021-05-26 PROCEDURE — 97110 THERAPEUTIC EXERCISES: CPT | Mod: CQ

## 2021-06-02 ENCOUNTER — CLINICAL SUPPORT (OUTPATIENT)
Dept: REHABILITATION | Facility: HOSPITAL | Age: 86
End: 2021-06-02
Attending: INTERNAL MEDICINE
Payer: COMMERCIAL

## 2021-06-02 DIAGNOSIS — M54.50 LUMBAR PAIN: ICD-10-CM

## 2021-06-02 DIAGNOSIS — R53.1 DECREASED STRENGTH: ICD-10-CM

## 2021-06-02 DIAGNOSIS — R26.9 IMPAIRED GAIT: ICD-10-CM

## 2021-06-02 PROCEDURE — 97112 NEUROMUSCULAR REEDUCATION: CPT

## 2021-06-02 PROCEDURE — 97110 THERAPEUTIC EXERCISES: CPT

## 2021-06-03 ENCOUNTER — OFFICE VISIT (OUTPATIENT)
Dept: NEUROLOGY | Facility: CLINIC | Age: 86
End: 2021-06-03
Payer: COMMERCIAL

## 2021-06-03 DIAGNOSIS — R41.3 MEMORY CHANGE: ICD-10-CM

## 2021-06-03 PROCEDURE — 99499 UNLISTED E&M SERVICE: CPT | Mod: 95,,, | Performed by: CLINICAL NEUROPSYCHOLOGIST

## 2021-06-03 PROCEDURE — 1157F PR ADVANCE CARE PLAN OR EQUIV PRESENT IN MEDICAL RECORD: ICD-10-PCS | Mod: ,,, | Performed by: CLINICAL NEUROPSYCHOLOGIST

## 2021-06-03 PROCEDURE — 99499 NO LOS: ICD-10-PCS | Mod: 95,,, | Performed by: CLINICAL NEUROPSYCHOLOGIST

## 2021-06-03 PROCEDURE — 1157F ADVNC CARE PLAN IN RCRD: CPT | Mod: ,,, | Performed by: CLINICAL NEUROPSYCHOLOGIST

## 2021-06-04 ENCOUNTER — PATIENT MESSAGE (OUTPATIENT)
Dept: NEUROLOGY | Facility: CLINIC | Age: 86
End: 2021-06-04

## 2021-06-04 PROBLEM — F32.A DEPRESSION: Status: ACTIVE | Noted: 2021-06-04

## 2021-06-07 ENCOUNTER — CLINICAL SUPPORT (OUTPATIENT)
Dept: REHABILITATION | Facility: HOSPITAL | Age: 86
End: 2021-06-07
Attending: INTERNAL MEDICINE
Payer: COMMERCIAL

## 2021-06-07 DIAGNOSIS — R26.9 IMPAIRED GAIT: ICD-10-CM

## 2021-06-07 DIAGNOSIS — M54.50 LUMBAR PAIN: ICD-10-CM

## 2021-06-07 DIAGNOSIS — R53.1 DECREASED STRENGTH: ICD-10-CM

## 2021-06-07 PROCEDURE — 97112 NEUROMUSCULAR REEDUCATION: CPT

## 2021-06-07 PROCEDURE — 97110 THERAPEUTIC EXERCISES: CPT

## 2021-06-09 ENCOUNTER — CLINICAL SUPPORT (OUTPATIENT)
Dept: REHABILITATION | Facility: HOSPITAL | Age: 86
End: 2021-06-09
Attending: INTERNAL MEDICINE
Payer: COMMERCIAL

## 2021-06-09 DIAGNOSIS — M54.50 LUMBAR PAIN: ICD-10-CM

## 2021-06-09 DIAGNOSIS — R26.9 IMPAIRED GAIT: ICD-10-CM

## 2021-06-09 DIAGNOSIS — R53.1 DECREASED STRENGTH: ICD-10-CM

## 2021-06-09 PROCEDURE — 97110 THERAPEUTIC EXERCISES: CPT

## 2021-06-09 PROCEDURE — 97112 NEUROMUSCULAR REEDUCATION: CPT

## 2021-06-16 ENCOUNTER — CLINICAL SUPPORT (OUTPATIENT)
Dept: REHABILITATION | Facility: HOSPITAL | Age: 86
End: 2021-06-16
Attending: INTERNAL MEDICINE
Payer: COMMERCIAL

## 2021-06-16 DIAGNOSIS — M54.50 LUMBAR PAIN: ICD-10-CM

## 2021-06-16 DIAGNOSIS — R26.9 IMPAIRED GAIT: ICD-10-CM

## 2021-06-16 DIAGNOSIS — R53.1 DECREASED STRENGTH: ICD-10-CM

## 2021-06-16 PROCEDURE — 97110 THERAPEUTIC EXERCISES: CPT

## 2021-07-16 ENCOUNTER — PATIENT MESSAGE (OUTPATIENT)
Dept: SLEEP MEDICINE | Facility: CLINIC | Age: 86
End: 2021-07-16

## 2021-07-23 ENCOUNTER — HOSPITAL ENCOUNTER (OUTPATIENT)
Dept: RADIOLOGY | Facility: HOSPITAL | Age: 86
Discharge: HOME | End: 2021-07-23
Attending: INTERNAL MEDICINE
Payer: COMMERCIAL

## 2021-07-23 VITALS — HEIGHT: 67 IN | WEIGHT: 215 LBS | BODY MASS INDEX: 33.74 KG/M2

## 2021-07-23 DIAGNOSIS — Z12.31 VISIT FOR SCREENING MAMMOGRAM: ICD-10-CM

## 2021-07-23 PROCEDURE — 77067 SCR MAMMO BI INCL CAD: CPT | Mod: 26,,, | Performed by: RADIOLOGY

## 2021-07-23 PROCEDURE — 77067 SCR MAMMO BI INCL CAD: CPT | Mod: TC

## 2021-07-23 PROCEDURE — 77063 BREAST TOMOSYNTHESIS BI: CPT | Mod: 26,,, | Performed by: RADIOLOGY

## 2021-07-23 PROCEDURE — 77063 MAMMO DIGITAL SCREENING BILAT WITH TOMO: ICD-10-PCS | Mod: 26,,, | Performed by: RADIOLOGY

## 2021-07-23 PROCEDURE — 77067 MAMMO DIGITAL SCREENING BILAT WITH TOMO: ICD-10-PCS | Mod: 26,,, | Performed by: RADIOLOGY

## 2021-08-02 ENCOUNTER — TELEPHONE (OUTPATIENT)
Dept: INTERNAL MEDICINE | Facility: CLINIC | Age: 86
End: 2021-08-02

## 2021-08-02 ENCOUNTER — PATIENT MESSAGE (OUTPATIENT)
Dept: INTERNAL MEDICINE | Facility: CLINIC | Age: 86
End: 2021-08-02

## 2021-08-03 ENCOUNTER — TELEPHONE (OUTPATIENT)
Dept: SLEEP MEDICINE | Facility: CLINIC | Age: 86
End: 2021-08-03

## 2021-08-17 ENCOUNTER — TELEPHONE (OUTPATIENT)
Dept: SLEEP MEDICINE | Facility: CLINIC | Age: 86
End: 2021-08-17

## 2021-08-18 ENCOUNTER — OFFICE VISIT (OUTPATIENT)
Dept: SLEEP MEDICINE | Facility: CLINIC | Age: 86
End: 2021-08-18
Payer: COMMERCIAL

## 2021-08-18 VITALS
HEART RATE: 84 BPM | BODY MASS INDEX: 33.74 KG/M2 | SYSTOLIC BLOOD PRESSURE: 146 MMHG | DIASTOLIC BLOOD PRESSURE: 78 MMHG | WEIGHT: 214.94 LBS | HEIGHT: 67 IN

## 2021-08-18 DIAGNOSIS — G47.33 OSA (OBSTRUCTIVE SLEEP APNEA): Primary | ICD-10-CM

## 2021-08-18 DIAGNOSIS — Z71.89 CPAP USE COUNSELING: ICD-10-CM

## 2021-08-18 PROCEDURE — 1157F ADVNC CARE PLAN IN RCRD: CPT | Mod: CPTII,S$GLB,, | Performed by: NURSE PRACTITIONER

## 2021-08-18 PROCEDURE — 1157F PR ADVANCE CARE PLAN OR EQUIV PRESENT IN MEDICAL RECORD: ICD-10-PCS | Mod: CPTII,S$GLB,, | Performed by: NURSE PRACTITIONER

## 2021-08-18 PROCEDURE — 3078F PR MOST RECENT DIASTOLIC BLOOD PRESSURE < 80 MM HG: ICD-10-PCS | Mod: CPTII,S$GLB,, | Performed by: NURSE PRACTITIONER

## 2021-08-18 PROCEDURE — 3077F SYST BP >= 140 MM HG: CPT | Mod: CPTII,S$GLB,, | Performed by: NURSE PRACTITIONER

## 2021-08-18 PROCEDURE — 99999 PR PBB SHADOW E&M-EST. PATIENT-LVL IV: CPT | Mod: PBBFAC,,, | Performed by: NURSE PRACTITIONER

## 2021-08-18 PROCEDURE — 1159F MED LIST DOCD IN RCRD: CPT | Mod: CPTII,S$GLB,, | Performed by: NURSE PRACTITIONER

## 2021-08-18 PROCEDURE — 1126F AMNT PAIN NOTED NONE PRSNT: CPT | Mod: CPTII,S$GLB,, | Performed by: NURSE PRACTITIONER

## 2021-08-18 PROCEDURE — 1159F PR MEDICATION LIST DOCUMENTED IN MEDICAL RECORD: ICD-10-PCS | Mod: CPTII,S$GLB,, | Performed by: NURSE PRACTITIONER

## 2021-08-18 PROCEDURE — 3078F DIAST BP <80 MM HG: CPT | Mod: CPTII,S$GLB,, | Performed by: NURSE PRACTITIONER

## 2021-08-18 PROCEDURE — 1126F PR PAIN SEVERITY QUANTIFIED, NO PAIN PRESENT: ICD-10-PCS | Mod: CPTII,S$GLB,, | Performed by: NURSE PRACTITIONER

## 2021-08-18 PROCEDURE — 99213 PR OFFICE/OUTPT VISIT, EST, LEVL III, 20-29 MIN: ICD-10-PCS | Mod: S$GLB,,, | Performed by: NURSE PRACTITIONER

## 2021-08-18 PROCEDURE — 3077F PR MOST RECENT SYSTOLIC BLOOD PRESSURE >= 140 MM HG: ICD-10-PCS | Mod: CPTII,S$GLB,, | Performed by: NURSE PRACTITIONER

## 2021-08-18 PROCEDURE — 99999 PR PBB SHADOW E&M-EST. PATIENT-LVL IV: ICD-10-PCS | Mod: PBBFAC,,, | Performed by: NURSE PRACTITIONER

## 2021-08-18 PROCEDURE — 1160F RVW MEDS BY RX/DR IN RCRD: CPT | Mod: CPTII,S$GLB,, | Performed by: NURSE PRACTITIONER

## 2021-08-18 PROCEDURE — 99213 OFFICE O/P EST LOW 20 MIN: CPT | Mod: S$GLB,,, | Performed by: NURSE PRACTITIONER

## 2021-08-18 PROCEDURE — 1160F PR REVIEW ALL MEDS BY PRESCRIBER/CLIN PHARMACIST DOCUMENTED: ICD-10-PCS | Mod: CPTII,S$GLB,, | Performed by: NURSE PRACTITIONER

## 2021-08-21 ENCOUNTER — IMMUNIZATION (OUTPATIENT)
Dept: INTERNAL MEDICINE | Facility: CLINIC | Age: 86
End: 2021-08-21
Payer: COMMERCIAL

## 2021-08-21 DIAGNOSIS — Z23 NEED FOR VACCINATION: Primary | ICD-10-CM

## 2021-08-21 PROCEDURE — 91300 COVID-19, MRNA, LNP-S, PF, 30 MCG/0.3 ML DOSE VACCINE: CPT | Mod: ,,, | Performed by: INTERNAL MEDICINE

## 2021-08-21 PROCEDURE — 91300 COVID-19, MRNA, LNP-S, PF, 30 MCG/0.3 ML DOSE VACCINE: ICD-10-PCS | Mod: ,,, | Performed by: INTERNAL MEDICINE

## 2021-08-21 PROCEDURE — 0003A COVID-19, MRNA, LNP-S, PF, 30 MCG/0.3 ML DOSE VACCINE: CPT | Mod: CV19,,, | Performed by: INTERNAL MEDICINE

## 2021-08-21 PROCEDURE — 0003A COVID-19, MRNA, LNP-S, PF, 30 MCG/0.3 ML DOSE VACCINE: ICD-10-PCS | Mod: CV19,,, | Performed by: INTERNAL MEDICINE

## 2021-09-27 ENCOUNTER — HOSPITAL ENCOUNTER (OUTPATIENT)
Dept: RADIOLOGY | Facility: HOSPITAL | Age: 86
Discharge: HOME | End: 2021-09-27
Attending: PHYSICIAN ASSISTANT
Payer: COMMERCIAL

## 2021-09-27 ENCOUNTER — OFFICE VISIT (OUTPATIENT)
Dept: INTERNAL MEDICINE | Facility: CLINIC | Age: 86
End: 2021-09-27
Payer: COMMERCIAL

## 2021-09-27 ENCOUNTER — PATIENT MESSAGE (OUTPATIENT)
Dept: INTERNAL MEDICINE | Facility: CLINIC | Age: 86
End: 2021-09-27

## 2021-09-27 VITALS
DIASTOLIC BLOOD PRESSURE: 60 MMHG | SYSTOLIC BLOOD PRESSURE: 128 MMHG | TEMPERATURE: 98 F | WEIGHT: 224.19 LBS | HEIGHT: 67 IN | BODY MASS INDEX: 35.19 KG/M2 | RESPIRATION RATE: 16 BRPM | OXYGEN SATURATION: 95 % | HEART RATE: 89 BPM

## 2021-09-27 DIAGNOSIS — M79.89 LEG SWELLING: ICD-10-CM

## 2021-09-27 DIAGNOSIS — R09.81 NASAL CONGESTION: ICD-10-CM

## 2021-09-27 DIAGNOSIS — I87.2 VENOUS INSUFFICIENCY: ICD-10-CM

## 2021-09-27 DIAGNOSIS — R06.02 SHORTNESS OF BREATH: ICD-10-CM

## 2021-09-27 DIAGNOSIS — R06.09 DOE (DYSPNEA ON EXERTION): ICD-10-CM

## 2021-09-27 DIAGNOSIS — M79.89 LEG SWELLING: Primary | ICD-10-CM

## 2021-09-27 PROCEDURE — 71046 X-RAY EXAM CHEST 2 VIEWS: CPT | Mod: 26,,, | Performed by: RADIOLOGY

## 2021-09-27 PROCEDURE — 99214 OFFICE O/P EST MOD 30 MIN: CPT | Mod: S$GLB,,, | Performed by: PHYSICIAN ASSISTANT

## 2021-09-27 PROCEDURE — 1160F RVW MEDS BY RX/DR IN RCRD: CPT | Mod: CPTII,S$GLB,, | Performed by: PHYSICIAN ASSISTANT

## 2021-09-27 PROCEDURE — 99999 PR PBB SHADOW E&M-EST. PATIENT-LVL V: ICD-10-PCS | Mod: PBBFAC,,, | Performed by: PHYSICIAN ASSISTANT

## 2021-09-27 PROCEDURE — 1100F PTFALLS ASSESS-DOCD GE2>/YR: CPT | Mod: CPTII,S$GLB,, | Performed by: PHYSICIAN ASSISTANT

## 2021-09-27 PROCEDURE — U0005 INFEC AGEN DETEC AMPLI PROBE: HCPCS | Performed by: PHYSICIAN ASSISTANT

## 2021-09-27 PROCEDURE — 1100F PR PT FALLS ASSESS DOC 2+ FALLS/FALL W/INJURY/YR: ICD-10-PCS | Mod: CPTII,S$GLB,, | Performed by: PHYSICIAN ASSISTANT

## 2021-09-27 PROCEDURE — 99214 PR OFFICE/OUTPT VISIT, EST, LEVL IV, 30-39 MIN: ICD-10-PCS | Mod: S$GLB,,, | Performed by: PHYSICIAN ASSISTANT

## 2021-09-27 PROCEDURE — 1157F ADVNC CARE PLAN IN RCRD: CPT | Mod: CPTII,S$GLB,, | Performed by: PHYSICIAN ASSISTANT

## 2021-09-27 PROCEDURE — 1125F AMNT PAIN NOTED PAIN PRSNT: CPT | Mod: CPTII,S$GLB,, | Performed by: PHYSICIAN ASSISTANT

## 2021-09-27 PROCEDURE — U0003 INFECTIOUS AGENT DETECTION BY NUCLEIC ACID (DNA OR RNA); SEVERE ACUTE RESPIRATORY SYNDROME CORONAVIRUS 2 (SARS-COV-2) (CORONAVIRUS DISEASE [COVID-19]), AMPLIFIED PROBE TECHNIQUE, MAKING USE OF HIGH THROUGHPUT TECHNOLOGIES AS DESCRIBED BY CMS-2020-01-R: HCPCS | Performed by: PHYSICIAN ASSISTANT

## 2021-09-27 PROCEDURE — 1159F PR MEDICATION LIST DOCUMENTED IN MEDICAL RECORD: ICD-10-PCS | Mod: CPTII,S$GLB,, | Performed by: PHYSICIAN ASSISTANT

## 2021-09-27 PROCEDURE — 3074F SYST BP LT 130 MM HG: CPT | Mod: CPTII,S$GLB,, | Performed by: PHYSICIAN ASSISTANT

## 2021-09-27 PROCEDURE — 1160F PR REVIEW ALL MEDS BY PRESCRIBER/CLIN PHARMACIST DOCUMENTED: ICD-10-PCS | Mod: CPTII,S$GLB,, | Performed by: PHYSICIAN ASSISTANT

## 2021-09-27 PROCEDURE — 99999 PR PBB SHADOW E&M-EST. PATIENT-LVL V: CPT | Mod: PBBFAC,,, | Performed by: PHYSICIAN ASSISTANT

## 2021-09-27 PROCEDURE — 1159F MED LIST DOCD IN RCRD: CPT | Mod: CPTII,S$GLB,, | Performed by: PHYSICIAN ASSISTANT

## 2021-09-27 PROCEDURE — 3078F PR MOST RECENT DIASTOLIC BLOOD PRESSURE < 80 MM HG: ICD-10-PCS | Mod: CPTII,S$GLB,, | Performed by: PHYSICIAN ASSISTANT

## 2021-09-27 PROCEDURE — 71046 X-RAY EXAM CHEST 2 VIEWS: CPT | Mod: TC

## 2021-09-27 PROCEDURE — 3288F PR FALLS RISK ASSESSMENT DOCUMENTED: ICD-10-PCS | Mod: CPTII,S$GLB,, | Performed by: PHYSICIAN ASSISTANT

## 2021-09-27 PROCEDURE — 3078F DIAST BP <80 MM HG: CPT | Mod: CPTII,S$GLB,, | Performed by: PHYSICIAN ASSISTANT

## 2021-09-27 PROCEDURE — 3288F FALL RISK ASSESSMENT DOCD: CPT | Mod: CPTII,S$GLB,, | Performed by: PHYSICIAN ASSISTANT

## 2021-09-27 PROCEDURE — 1157F PR ADVANCE CARE PLAN OR EQUIV PRESENT IN MEDICAL RECORD: ICD-10-PCS | Mod: CPTII,S$GLB,, | Performed by: PHYSICIAN ASSISTANT

## 2021-09-27 PROCEDURE — 3074F PR MOST RECENT SYSTOLIC BLOOD PRESSURE < 130 MM HG: ICD-10-PCS | Mod: CPTII,S$GLB,, | Performed by: PHYSICIAN ASSISTANT

## 2021-09-27 PROCEDURE — 1125F PR PAIN SEVERITY QUANTIFIED, PAIN PRESENT: ICD-10-PCS | Mod: CPTII,S$GLB,, | Performed by: PHYSICIAN ASSISTANT

## 2021-09-27 PROCEDURE — 71046 XR CHEST PA AND LATERAL: ICD-10-PCS | Mod: 26,,, | Performed by: RADIOLOGY

## 2021-09-28 ENCOUNTER — TELEPHONE (OUTPATIENT)
Dept: INTERNAL MEDICINE | Facility: CLINIC | Age: 86
End: 2021-09-28

## 2021-09-28 LAB
SARS-COV-2 RNA RESP QL NAA+PROBE: NOT DETECTED
SARS-COV-2- CYCLE NUMBER: NORMAL

## 2021-10-22 ENCOUNTER — TELEPHONE (OUTPATIENT)
Dept: ENDOSCOPY | Facility: HOSPITAL | Age: 86
End: 2021-10-22

## 2021-10-26 ENCOUNTER — OFFICE VISIT (OUTPATIENT)
Dept: INTERNAL MEDICINE | Facility: CLINIC | Age: 86
End: 2021-10-26
Payer: COMMERCIAL

## 2021-10-26 VITALS
HEART RATE: 80 BPM | BODY MASS INDEX: 34.26 KG/M2 | HEIGHT: 67 IN | DIASTOLIC BLOOD PRESSURE: 60 MMHG | WEIGHT: 218.25 LBS | SYSTOLIC BLOOD PRESSURE: 120 MMHG | TEMPERATURE: 98 F | RESPIRATION RATE: 18 BRPM

## 2021-10-26 DIAGNOSIS — Z12.11 SCREEN FOR COLON CANCER: ICD-10-CM

## 2021-10-26 DIAGNOSIS — N18.30 STAGE 3 CHRONIC KIDNEY DISEASE, UNSPECIFIED WHETHER STAGE 3A OR 3B CKD: ICD-10-CM

## 2021-10-26 DIAGNOSIS — E03.9 HYPOTHYROIDISM, UNSPECIFIED TYPE: ICD-10-CM

## 2021-10-26 DIAGNOSIS — N32.81 URGENCY-FREQUENCY SYNDROME: ICD-10-CM

## 2021-10-26 DIAGNOSIS — E78.5 HYPERLIPIDEMIA, UNSPECIFIED HYPERLIPIDEMIA TYPE: Primary | ICD-10-CM

## 2021-10-26 DIAGNOSIS — M79.89 LEG SWELLING: ICD-10-CM

## 2021-10-26 DIAGNOSIS — F32.A ANXIETY AND DEPRESSION: ICD-10-CM

## 2021-10-26 DIAGNOSIS — F41.9 ANXIETY AND DEPRESSION: ICD-10-CM

## 2021-10-26 DIAGNOSIS — G47.00 INSOMNIA, UNSPECIFIED TYPE: ICD-10-CM

## 2021-10-26 PROCEDURE — 3288F PR FALLS RISK ASSESSMENT DOCUMENTED: ICD-10-PCS | Mod: CPTII,S$GLB,, | Performed by: INTERNAL MEDICINE

## 2021-10-26 PROCEDURE — 1157F PR ADVANCE CARE PLAN OR EQUIV PRESENT IN MEDICAL RECORD: ICD-10-PCS | Mod: CPTII,S$GLB,, | Performed by: INTERNAL MEDICINE

## 2021-10-26 PROCEDURE — 90694 FLU VACCINE - QUADRIVALENT - ADJUVANTED: ICD-10-PCS | Mod: S$GLB,,, | Performed by: INTERNAL MEDICINE

## 2021-10-26 PROCEDURE — 3288F FALL RISK ASSESSMENT DOCD: CPT | Mod: CPTII,S$GLB,, | Performed by: INTERNAL MEDICINE

## 2021-10-26 PROCEDURE — 1126F PR PAIN SEVERITY QUANTIFIED, NO PAIN PRESENT: ICD-10-PCS | Mod: CPTII,S$GLB,, | Performed by: INTERNAL MEDICINE

## 2021-10-26 PROCEDURE — 3078F DIAST BP <80 MM HG: CPT | Mod: CPTII,S$GLB,, | Performed by: INTERNAL MEDICINE

## 2021-10-26 PROCEDURE — 1101F PT FALLS ASSESS-DOCD LE1/YR: CPT | Mod: CPTII,S$GLB,, | Performed by: INTERNAL MEDICINE

## 2021-10-26 PROCEDURE — 90694 VACC AIIV4 NO PRSRV 0.5ML IM: CPT | Mod: S$GLB,,, | Performed by: INTERNAL MEDICINE

## 2021-10-26 PROCEDURE — 3074F PR MOST RECENT SYSTOLIC BLOOD PRESSURE < 130 MM HG: ICD-10-PCS | Mod: CPTII,S$GLB,, | Performed by: INTERNAL MEDICINE

## 2021-10-26 PROCEDURE — 3074F SYST BP LT 130 MM HG: CPT | Mod: CPTII,S$GLB,, | Performed by: INTERNAL MEDICINE

## 2021-10-26 PROCEDURE — 1159F MED LIST DOCD IN RCRD: CPT | Mod: CPTII,S$GLB,, | Performed by: INTERNAL MEDICINE

## 2021-10-26 PROCEDURE — 1101F PR PT FALLS ASSESS DOC 0-1 FALLS W/OUT INJ PAST YR: ICD-10-PCS | Mod: CPTII,S$GLB,, | Performed by: INTERNAL MEDICINE

## 2021-10-26 PROCEDURE — 99999 PR PBB SHADOW E&M-EST. PATIENT-LVL IV: CPT | Mod: PBBFAC,,, | Performed by: INTERNAL MEDICINE

## 2021-10-26 PROCEDURE — 90471 FLU VACCINE - QUADRIVALENT - ADJUVANTED: ICD-10-PCS | Mod: S$GLB,,, | Performed by: INTERNAL MEDICINE

## 2021-10-26 PROCEDURE — 99397 PR PREVENTIVE VISIT,EST,65 & OVER: ICD-10-PCS | Mod: 25,S$GLB,, | Performed by: INTERNAL MEDICINE

## 2021-10-26 PROCEDURE — 1160F RVW MEDS BY RX/DR IN RCRD: CPT | Mod: CPTII,S$GLB,, | Performed by: INTERNAL MEDICINE

## 2021-10-26 PROCEDURE — 1159F PR MEDICATION LIST DOCUMENTED IN MEDICAL RECORD: ICD-10-PCS | Mod: CPTII,S$GLB,, | Performed by: INTERNAL MEDICINE

## 2021-10-26 PROCEDURE — 1157F ADVNC CARE PLAN IN RCRD: CPT | Mod: CPTII,S$GLB,, | Performed by: INTERNAL MEDICINE

## 2021-10-26 PROCEDURE — 3078F PR MOST RECENT DIASTOLIC BLOOD PRESSURE < 80 MM HG: ICD-10-PCS | Mod: CPTII,S$GLB,, | Performed by: INTERNAL MEDICINE

## 2021-10-26 PROCEDURE — 1126F AMNT PAIN NOTED NONE PRSNT: CPT | Mod: CPTII,S$GLB,, | Performed by: INTERNAL MEDICINE

## 2021-10-26 PROCEDURE — 99397 PER PM REEVAL EST PAT 65+ YR: CPT | Mod: 25,S$GLB,, | Performed by: INTERNAL MEDICINE

## 2021-10-26 PROCEDURE — 1160F PR REVIEW ALL MEDS BY PRESCRIBER/CLIN PHARMACIST DOCUMENTED: ICD-10-PCS | Mod: CPTII,S$GLB,, | Performed by: INTERNAL MEDICINE

## 2021-10-26 PROCEDURE — 99999 PR PBB SHADOW E&M-EST. PATIENT-LVL IV: ICD-10-PCS | Mod: PBBFAC,,, | Performed by: INTERNAL MEDICINE

## 2021-10-26 PROCEDURE — 90471 IMMUNIZATION ADMIN: CPT | Mod: S$GLB,,, | Performed by: INTERNAL MEDICINE

## 2021-10-26 RX ORDER — TEMAZEPAM 30 MG/1
30 CAPSULE ORAL NIGHTLY
Qty: 90 CAPSULE | Refills: 0 | Status: SHIPPED | OUTPATIENT
Start: 2021-10-26 | End: 2022-02-08

## 2021-11-03 ENCOUNTER — PATIENT MESSAGE (OUTPATIENT)
Dept: ADMINISTRATIVE | Facility: OTHER | Age: 86
End: 2021-11-03
Payer: COMMERCIAL

## 2021-11-05 ENCOUNTER — HOSPITAL ENCOUNTER (OUTPATIENT)
Dept: CARDIOLOGY | Facility: HOSPITAL | Age: 86
Discharge: HOME | End: 2021-11-05
Attending: INTERNAL MEDICINE
Payer: COMMERCIAL

## 2021-11-05 VITALS
BODY MASS INDEX: 34.21 KG/M2 | HEIGHT: 67 IN | DIASTOLIC BLOOD PRESSURE: 72 MMHG | SYSTOLIC BLOOD PRESSURE: 130 MMHG | HEART RATE: 75 BPM | WEIGHT: 218 LBS

## 2021-11-05 DIAGNOSIS — M79.89 LEG SWELLING: ICD-10-CM

## 2021-11-05 LAB
ASCENDING AORTA: 3.16 CM
AV INDEX (PROSTH): 0.67
AV MEAN GRADIENT: 8 MMHG
AV PEAK GRADIENT: 13 MMHG
AV VALVE AREA: 2.03 CM2
AV VELOCITY RATIO: 0.67
BSA FOR ECHO PROCEDURE: 2.16 M2
CV ECHO LV RWT: 0.29 CM
DOP CALC AO PEAK VEL: 1.81 M/S
DOP CALC AO VTI: 39.17 CM
DOP CALC LVOT AREA: 3 CM2
DOP CALC LVOT DIAMETER: 1.97 CM
DOP CALC LVOT PEAK VEL: 1.22 M/S
DOP CALC LVOT STROKE VOLUME: 79.39 CM3
DOP CALCLVOT PEAK VEL VTI: 26.06 CM
E WAVE DECELERATION TIME: 195.78 MSEC
E/A RATIO: 1.01
E/E' RATIO: 9.79 M/S
ECHO LV POSTERIOR WALL: 0.74 CM (ref 0.6–1.1)
EJECTION FRACTION: 65 %
FRACTIONAL SHORTENING: 38 % (ref 28–44)
INTERVENTRICULAR SEPTUM: 1.1 CM (ref 0.6–1.1)
IVRT: 79.92 MSEC
LA MAJOR: 4.41 CM
LA MINOR: 4.5 CM
LA WIDTH: 3.18 CM
LEFT ATRIUM SIZE: 3.88 CM
LEFT ATRIUM VOLUME INDEX MOD: 14.8 ML/M2
LEFT ATRIUM VOLUME INDEX: 22.2 ML/M2
LEFT ATRIUM VOLUME MOD: 30.98 CM3
LEFT ATRIUM VOLUME: 46.72 CM3
LEFT INTERNAL DIMENSION IN SYSTOLE: 3.11 CM (ref 2.1–4)
LEFT VENTRICLE DIASTOLIC VOLUME INDEX: 57.59 ML/M2
LEFT VENTRICLE DIASTOLIC VOLUME: 120.93 ML
LEFT VENTRICLE MASS INDEX: 79 G/M2
LEFT VENTRICLE SYSTOLIC VOLUME INDEX: 18.2 ML/M2
LEFT VENTRICLE SYSTOLIC VOLUME: 38.27 ML
LEFT VENTRICULAR INTERNAL DIMENSION IN DIASTOLE: 5.05 CM (ref 3.5–6)
LEFT VENTRICULAR MASS: 165.58 G
LV LATERAL E/E' RATIO: 8.45 M/S
LV SEPTAL E/E' RATIO: 11.63 M/S
MV PEAK A VEL: 0.92 M/S
MV PEAK E VEL: 0.93 M/S
MV STENOSIS PRESSURE HALF TIME: 56.78 MS
MV VALVE AREA P 1/2 METHOD: 3.87 CM2
PISA TR MAX VEL: 2.62 M/S
PULM VEIN S/D RATIO: 1.25
PV PEAK D VEL: 0.6 M/S
PV PEAK S VEL: 0.75 M/S
RA MAJOR: 3.65 CM
RA PRESSURE: 3 MMHG
RA WIDTH: 2.39 CM
RIGHT VENTRICULAR END-DIASTOLIC DIMENSION: 2.36 CM
SINUS: 2.76 CM
STJ: 2.48 CM
TDI LATERAL: 0.11 M/S
TDI SEPTAL: 0.08 M/S
TDI: 0.1 M/S
TR MAX PG: 27 MMHG
TRICUSPID ANNULAR PLANE SYSTOLIC EXCURSION: 2.05 CM
TV REST PULMONARY ARTERY PRESSURE: 30 MMHG

## 2021-11-05 PROCEDURE — 93306 TTE W/DOPPLER COMPLETE: CPT | Mod: 26,,, | Performed by: INTERNAL MEDICINE

## 2021-11-05 PROCEDURE — 93306 TTE W/DOPPLER COMPLETE: CPT

## 2021-11-05 PROCEDURE — 93306 ECHO (CUPID ONLY): ICD-10-PCS | Mod: 26,,, | Performed by: INTERNAL MEDICINE

## 2021-12-16 RX ORDER — LEVOTHYROXINE SODIUM 175 UG/1
TABLET ORAL
Qty: 30 TABLET | OUTPATIENT
Start: 2021-12-16

## 2022-02-08 RX ORDER — TEMAZEPAM 30 MG/1
30 CAPSULE ORAL NIGHTLY
Qty: 90 CAPSULE | Refills: 0 | Status: SHIPPED | OUTPATIENT
Start: 2022-02-08 | End: 2022-05-09

## 2022-02-08 NOTE — TELEPHONE ENCOUNTER
No new care gaps identified.  Powered by [employer]. Reference number: [id number].   2/08/2022 7:51:46 AM CST

## 2022-03-25 DIAGNOSIS — R06.09 DOE (DYSPNEA ON EXERTION): ICD-10-CM

## 2022-03-25 DIAGNOSIS — F32.A ANXIETY AND DEPRESSION: ICD-10-CM

## 2022-03-25 DIAGNOSIS — F41.9 ANXIETY AND DEPRESSION: ICD-10-CM

## 2022-03-25 DIAGNOSIS — E78.5 HYPERLIPIDEMIA, UNSPECIFIED HYPERLIPIDEMIA TYPE: Chronic | ICD-10-CM

## 2022-03-25 DIAGNOSIS — E03.9 HYPOTHYROIDISM, UNSPECIFIED TYPE: Chronic | ICD-10-CM

## 2022-03-25 DIAGNOSIS — I15.2 HYPERTENSION DUE TO ENDOCRINE DISORDER: Chronic | ICD-10-CM

## 2022-03-25 RX ORDER — ESCITALOPRAM OXALATE 20 MG/1
20 TABLET ORAL DAILY
Qty: 90 TABLET | Refills: 2 | Status: SHIPPED | OUTPATIENT
Start: 2022-03-25 | End: 2022-05-24 | Stop reason: SDUPTHER

## 2022-03-25 NOTE — TELEPHONE ENCOUNTER
Refill Routing Note   Medication(s) are not appropriate for processing by [facility] for the following reason(s):      - Drug-Drug Interaction (donepeziL and EScitalopram oxalate )  - Drug-Disease Interaction (EScitalopram oxalate and Hypokalemia)    [facility] action(s):  Defer Medication-related problems identified:   Drug-drug interaction  Drug-disease interaction        --->Care Gap information included in message below if applicable.   Medication reconciliation completed: No   Automatic Epic Generated Protocol Data:        Requested Prescriptions   Pending Prescriptions Disp Refills    EScitalopram oxalate (LEXAPRO) 20 MG tablet [Pharmacy Med Name: ESCITALOPRAM 20 MG       TAB] 90 tablet 2     Sig: Take 1 tablet (20 mg total) by mouth once daily.       Psychiatry:  Antidepressants - SSRI Passed - 3/25/2022  1:24 PM        Passed - Patient is at least 18 years old        Passed - Valid encounter within last 15 months     Recent Visits  Date Type Provider Dept   10/26/21 Office Visit Arpita Guevara DO [facility] Internal Medicine   04/28/21 Office Visit Arpita Guevara DO [facility] Internal Medicine   04/28/20 Office Visit Arpita Guevara DO [facility] Internal Medicine   Showing recent visits within past 720 days and meeting all other requirements  Future Appointments  No visits were found meeting these conditions.  Showing future appointments within next 150 days and meeting all other requirements      Future Appointments              In 3 weeks LAB, [facility], [place]    In 1 month DO [facility] - Internal Medicine, [place]                Passed - Matches previous order       Previous Authorizing Provider: Anton Villegas MD (EScitalopram oxalate (LEXAPRO) 20 MG tablet)  Previous Pharmacy: [id number] [place] [id number] [facility]            Passed - No ED/Hospital visits since last PCP visit     Last PCP Visit: 10/26/2021 Last Admission:  6/3/2016 Last ED Visit:                 Appointments  past 12m or future 3m with PCP    Date Provider   Last Visit   10/26/2021 Arpita Guevara, DO   Next Visit   4/26/2022 Arpita Guevara DO   ED visits in past 90 days: 0        Note composed:1:34 PM 03/25/2022

## 2022-03-25 NOTE — TELEPHONE ENCOUNTER
No new care gaps identified.  Powered by [employer]. Reference number: [id number].   3/25/2022 10:30:04 AM CDT

## 2022-04-18 ENCOUNTER — TELEPHONE (OUTPATIENT)
Dept: INTERNAL MEDICINE | Facility: CLINIC | Age: 87
End: 2022-04-18
Payer: COMMERCIAL

## 2022-04-18 NOTE — TELEPHONE ENCOUNTER
----- Message from Madeline Philippe MA sent at 4/18/2022  9:08 AM CDT -----  Regarding: lab orders needed  Pt has a lab appt scheduled for tomorrow Tuesday 4/19 at the [facility] with no orders attached. Please place/link orders for labs to be done, thank you!

## 2022-04-19 ENCOUNTER — LAB VISIT (OUTPATIENT)
Dept: LAB | Facility: HOSPITAL | Age: 87
End: 2022-04-19
Payer: COMMERCIAL

## 2022-04-19 ENCOUNTER — TELEPHONE (OUTPATIENT)
Dept: INTERNAL MEDICINE | Facility: CLINIC | Age: 87
End: 2022-04-19
Payer: COMMERCIAL

## 2022-04-19 DIAGNOSIS — E78.5 HYPERLIPIDEMIA, UNSPECIFIED HYPERLIPIDEMIA TYPE: Primary | ICD-10-CM

## 2022-04-19 DIAGNOSIS — E78.5 HYPERLIPIDEMIA, UNSPECIFIED HYPERLIPIDEMIA TYPE: ICD-10-CM

## 2022-04-19 LAB
ALBUMIN SERPL BCP-MCNC: 3.8 G/DL (ref 3.5–5.2)
ALP SERPL-CCNC: 84 U/L (ref 55–135)
ALT SERPL W/O P-5'-P-CCNC: 27 U/L (ref 10–44)
ANION GAP SERPL CALC-SCNC: 13 MMOL/L (ref 8–16)
AST SERPL-CCNC: 22 U/L (ref 10–40)
BILIRUB SERPL-MCNC: 0.5 MG/DL (ref 0.1–1)
BUN SERPL-MCNC: 37 MG/DL (ref 8–23)
CALCIUM SERPL-MCNC: 10.2 MG/DL (ref 8.7–10.5)
CHLORIDE SERPL-SCNC: 103 MMOL/L (ref 95–110)
CHOLEST SERPL-MCNC: 174 MG/DL (ref 120–199)
CHOLEST/HDLC SERPL: 3.4 {RATIO} (ref 2–5)
CO2 SERPL-SCNC: 23 MMOL/L (ref 23–29)
CREAT SERPL-MCNC: 1.4 MG/DL (ref 0.5–1.4)
EST. GFR  (AFRICAN AMERICAN): 39.8 ML/MIN/1.73 M^2
EST. GFR  (NON AFRICAN AMERICAN): 34.5 ML/MIN/1.73 M^2
GLUCOSE SERPL-MCNC: 110 MG/DL (ref 70–110)
HDLC SERPL-MCNC: 51 MG/DL (ref 40–75)
HDLC SERPL: 29.3 % (ref 20–50)
LDLC SERPL CALC-MCNC: 90.2 MG/DL (ref 63–159)
NONHDLC SERPL-MCNC: 123 MG/DL
POTASSIUM SERPL-SCNC: 4.1 MMOL/L (ref 3.5–5.1)
PROT SERPL-MCNC: 6.7 G/DL (ref 6–8.4)
SODIUM SERPL-SCNC: 139 MMOL/L (ref 136–145)
TRIGL SERPL-MCNC: 164 MG/DL (ref 30–150)

## 2022-04-19 PROCEDURE — 36415 COLL VENOUS BLD VENIPUNCTURE: CPT | Performed by: INTERNAL MEDICINE

## 2022-04-19 PROCEDURE — 80061 LIPID PANEL: CPT | Performed by: INTERNAL MEDICINE

## 2022-04-19 PROCEDURE — 80053 COMPREHEN METABOLIC PANEL: CPT | Performed by: INTERNAL MEDICINE

## 2022-04-19 NOTE — TELEPHONE ENCOUNTER
Noted that pt has a hx of HLD, however, unable to order LIPIDS for 6 month f/u.    Please review and advise as pt is at the lab and no orders.    Thank you.

## 2022-04-19 NOTE — TELEPHONE ENCOUNTER
----- Message from Brenda Fitzgerald sent at 4/19/2022 10:20 AM CDT -----  Contact: Nadeem at [facility] ext [phone]  Doctor appointment and lab have been scheduled.  Please link lab orders to the lab appointment.  Date of doctor appointment:  04/26  Date of lab appointment:  04/19  Physical or F/U: 6 month f/u  Comments: Nadeem states the pt is at the lab and there are no orders in

## 2022-04-24 DIAGNOSIS — F32.A ANXIETY AND DEPRESSION: ICD-10-CM

## 2022-04-24 DIAGNOSIS — I15.2 HYPERTENSION DUE TO ENDOCRINE DISORDER: Chronic | ICD-10-CM

## 2022-04-24 DIAGNOSIS — E78.5 HYPERLIPIDEMIA, UNSPECIFIED HYPERLIPIDEMIA TYPE: Chronic | ICD-10-CM

## 2022-04-24 DIAGNOSIS — R06.09 DOE (DYSPNEA ON EXERTION): ICD-10-CM

## 2022-04-24 DIAGNOSIS — F41.9 ANXIETY AND DEPRESSION: ICD-10-CM

## 2022-04-24 DIAGNOSIS — E03.9 HYPOTHYROIDISM, UNSPECIFIED TYPE: Chronic | ICD-10-CM

## 2022-04-24 NOTE — TELEPHONE ENCOUNTER
No new care gaps identified.  Powered by [employer] by [employer]. Reference number: [id number].   4/24/2022 10:24:44 AM CDT

## 2022-04-25 RX ORDER — BUPROPION HYDROCHLORIDE 150 MG/1
TABLET ORAL
Qty: 90 TABLET | Refills: 1 | Status: SHIPPED | OUTPATIENT
Start: 2022-04-25 | End: 2022-10-23 | Stop reason: SDUPTHER

## 2022-04-25 NOTE — TELEPHONE ENCOUNTER
Refill Authorization Note   Harriet Rutherford  is requesting a refill authorization.  Brief Assessment and Rationale for Refill:  Approve     Medication Therapy Plan:       Medication Reconciliation Completed: No   Comments:     No Care Gaps recommended.     Note composed:4:02 PM 04/25/2022

## 2022-05-06 ENCOUNTER — OFFICE VISIT (OUTPATIENT)
Dept: URGENT CARE | Facility: CLINIC | Age: 87
End: 2022-05-06
Payer: COMMERCIAL

## 2022-05-06 VITALS
HEIGHT: 67 IN | SYSTOLIC BLOOD PRESSURE: 157 MMHG | OXYGEN SATURATION: 95 % | RESPIRATION RATE: 18 BRPM | HEART RATE: 94 BPM | BODY MASS INDEX: 34.06 KG/M2 | TEMPERATURE: 97 F | DIASTOLIC BLOOD PRESSURE: 83 MMHG | WEIGHT: 217 LBS

## 2022-05-06 DIAGNOSIS — L03.114 CELLULITIS OF LEFT ELBOW: Primary | ICD-10-CM

## 2022-05-06 DIAGNOSIS — M25.522 LEFT ELBOW PAIN: ICD-10-CM

## 2022-05-06 PROCEDURE — 1157F ADVNC CARE PLAN IN RCRD: CPT | Mod: CPTII,S$GLB,, | Performed by: NURSE PRACTITIONER

## 2022-05-06 PROCEDURE — 1160F PR REVIEW ALL MEDS BY PRESCRIBER/CLIN PHARMACIST DOCUMENTED: ICD-10-PCS | Mod: CPTII,S$GLB,, | Performed by: NURSE PRACTITIONER

## 2022-05-06 PROCEDURE — 3079F PR MOST RECENT DIASTOLIC BLOOD PRESSURE 80-89 MM HG: ICD-10-PCS | Mod: CPTII,S$GLB,, | Performed by: NURSE PRACTITIONER

## 2022-05-06 PROCEDURE — 3077F PR MOST RECENT SYSTOLIC BLOOD PRESSURE >= 140 MM HG: ICD-10-PCS | Mod: CPTII,S$GLB,, | Performed by: NURSE PRACTITIONER

## 2022-05-06 PROCEDURE — 1159F MED LIST DOCD IN RCRD: CPT | Mod: CPTII,S$GLB,, | Performed by: NURSE PRACTITIONER

## 2022-05-06 PROCEDURE — 1125F PR PAIN SEVERITY QUANTIFIED, PAIN PRESENT: ICD-10-PCS | Mod: CPTII,S$GLB,, | Performed by: NURSE PRACTITIONER

## 2022-05-06 PROCEDURE — 1159F PR MEDICATION LIST DOCUMENTED IN MEDICAL RECORD: ICD-10-PCS | Mod: CPTII,S$GLB,, | Performed by: NURSE PRACTITIONER

## 2022-05-06 PROCEDURE — 99213 PR OFFICE/OUTPT VISIT, EST, LEVL III, 20-29 MIN: ICD-10-PCS | Mod: S$GLB,,, | Performed by: NURSE PRACTITIONER

## 2022-05-06 PROCEDURE — 1160F RVW MEDS BY RX/DR IN RCRD: CPT | Mod: CPTII,S$GLB,, | Performed by: NURSE PRACTITIONER

## 2022-05-06 PROCEDURE — 3079F DIAST BP 80-89 MM HG: CPT | Mod: CPTII,S$GLB,, | Performed by: NURSE PRACTITIONER

## 2022-05-06 PROCEDURE — 1125F AMNT PAIN NOTED PAIN PRSNT: CPT | Mod: CPTII,S$GLB,, | Performed by: NURSE PRACTITIONER

## 2022-05-06 PROCEDURE — 99213 OFFICE O/P EST LOW 20 MIN: CPT | Mod: S$GLB,,, | Performed by: NURSE PRACTITIONER

## 2022-05-06 PROCEDURE — 3077F SYST BP >= 140 MM HG: CPT | Mod: CPTII,S$GLB,, | Performed by: NURSE PRACTITIONER

## 2022-05-06 PROCEDURE — 1157F PR ADVANCE CARE PLAN OR EQUIV PRESENT IN MEDICAL RECORD: ICD-10-PCS | Mod: CPTII,S$GLB,, | Performed by: NURSE PRACTITIONER

## 2022-05-06 RX ORDER — CEPHALEXIN 500 MG/1
500 CAPSULE ORAL EVERY 12 HOURS
Qty: 14 CAPSULE | Refills: 0 | Status: SHIPPED | OUTPATIENT
Start: 2022-05-06 | End: 2022-05-13

## 2022-05-06 NOTE — PROGRESS NOTES
"Subjective:       Patient ID: Harriet Rutherford is a 86 y.o. female.    Vitals:  height is 5' 7" (1.702 m) and weight is 98.4 kg (217 lb). Her oral temperature is 97.4 °F (36.3 °C). Her blood pressure is 157/83 (abnormal) and her pulse is 94. Her respiration is 18 and oxygen saturation is 95%.     Chief Complaint: Arm Pain (Left elbow swollen)    Pt is a 85 yo female w/ c/o left arm began to itch on Monday and since Tuesday has become red, warm, and swollen. She used ice and otc antibiotic cream but no change. Pt denies pain w/ ROM, only painful if she leans on it.     Arm Pain   The incident occurred 3 to 5 days ago. The incident occurred at home. The injury mechanism is unknown. The pain is present in the left shoulder and left elbow. The quality of the pain is described as aching and cramping. The pain does not radiate. The pain is at a severity of 8/10. The pain is severe. The pain has been constant since the incident. The symptoms are aggravated by movement. She has tried immobilization for the symptoms. The treatment provided no relief.     ROS    Objective:      Physical Exam   Constitutional: She is oriented to person, place, and time. She appears well-developed. She is cooperative.  Non-toxic appearance. She does not appear ill. No distress.   HENT:   Head: Normocephalic and atraumatic.   Ears:   Right Ear: Hearing and external ear normal.   Left Ear: Hearing and external ear normal.   Nose: Nose normal. No mucosal edema or nasal deformity. No epistaxis. Right sinus exhibits no maxillary sinus tenderness and no frontal sinus tenderness. Left sinus exhibits no maxillary sinus tenderness and no frontal sinus tenderness.   Mouth/Throat: Uvula is midline, oropharynx is clear and moist and mucous membranes are normal. No trismus in the jaw. Normal dentition. No uvula swelling.   Eyes: Conjunctivae and lids are normal. Right eye exhibits no discharge. Left eye exhibits no discharge. No scleral icterus.   Neck: Trachea " normal and phonation normal. Neck supple.   Cardiovascular: Normal rate and normal pulses.   Pulmonary/Chest: Effort normal. No respiratory distress.   Abdominal: Normal appearance.   Musculoskeletal: Normal range of motion.         General: No deformity. Normal range of motion.   Neurological: She is alert and oriented to person, place, and time. She exhibits normal muscle tone. Coordination normal.   Skin: Skin is warm, dry, intact, not diaphoretic and not pale.        Psychiatric: Her speech is normal and behavior is normal. Judgment and thought content normal.   Nursing note and vitals reviewed.           Assessment:       1. Cellulitis of left elbow    2. Left elbow pain          Plan:         Cellulitis of left elbow  -     cephALEXin (KEFLEX) 500 MG capsule; Take 1 capsule (500 mg total) by mouth every 12 (twelve) hours. for 7 days  Dispense: 14 capsule; Refill: 0    Left elbow pain         Patient Instructions   - You must understand that you have received an Urgent Care treatment only and that you may be released before all of your medical problems are known or treated.   - You, the patient, will arrange for follow up care as instructed.   - If your condition worsens or fails to improve we recommend that you receive another evaluation at the ER immediately or contact your PCP to discuss your concerns.   - You can call [phone] or [phone] to help schedule an appointment with the appropriate provider.      Monitor for increasing redness, swelling, pain or drainage  Follow up ER if signs and symptoms worsen  Warm compresses 30-40 minutes 3-4 times daily

## 2022-05-06 NOTE — PATIENT INSTRUCTIONS
- You must understand that you have received an Urgent Care treatment only and that you may be released before all of your medical problems are known or treated.   - You, the patient, will arrange for follow up care as instructed.   - If your condition worsens or fails to improve we recommend that you receive another evaluation at the ER immediately or contact your PCP to discuss your concerns.   - You can call [phone] or [phone] to help schedule an appointment with the appropriate provider.      Monitor for increasing redness, swelling, pain or drainage  Follow up ER if signs and symptoms worsen  Warm compresses 30-40 minutes 3-4 times daily

## 2022-05-08 NOTE — TELEPHONE ENCOUNTER
No new care gaps identified.  [facility] Embedded Care Gaps. Reference number: [id number]. 5/08/2022   12:36:23 AM GORGET

## 2022-05-09 RX ORDER — TEMAZEPAM 30 MG/1
CAPSULE ORAL
Qty: 90 CAPSULE | Refills: 0 | Status: SHIPPED | OUTPATIENT
Start: 2022-05-09 | End: 2022-08-18

## 2022-05-24 DIAGNOSIS — E03.9 HYPOTHYROIDISM, UNSPECIFIED TYPE: Chronic | ICD-10-CM

## 2022-05-24 DIAGNOSIS — I15.2 HYPERTENSION DUE TO ENDOCRINE DISORDER: Chronic | ICD-10-CM

## 2022-05-24 DIAGNOSIS — R06.09 DOE (DYSPNEA ON EXERTION): ICD-10-CM

## 2022-05-24 DIAGNOSIS — F32.A ANXIETY AND DEPRESSION: ICD-10-CM

## 2022-05-24 DIAGNOSIS — F41.9 ANXIETY AND DEPRESSION: ICD-10-CM

## 2022-05-24 DIAGNOSIS — E78.5 HYPERLIPIDEMIA, UNSPECIFIED HYPERLIPIDEMIA TYPE: Chronic | ICD-10-CM

## 2022-05-24 NOTE — TELEPHONE ENCOUNTER
No new care gaps identified.  [facility] Embedded Care Gaps. Reference number: [id number]. 5/24/2022   11:05:27 AM GORGET

## 2022-05-25 RX ORDER — ESCITALOPRAM OXALATE 20 MG/1
TABLET ORAL
Qty: 90 TABLET | Refills: 1 | Status: SHIPPED | OUTPATIENT
Start: 2022-05-25 | End: 2023-03-20

## 2022-05-25 NOTE — TELEPHONE ENCOUNTER
Refill Routing Note   Medication(s) are not appropriate for processing by [facility] for the following reason(s):      - Drug-Disease Interaction (EScitalopram oxalate and Hypokalemia)    [facility] action(s):  Defer Medication-related problems identified: Drug-disease interaction        Medication reconciliation completed: No     Appointments  past 12m or future 3m with PCP    Date Provider   Last Visit   10/26/2021 Arpita Guevara, DO   Next Visit   9/27/2022 Arpita Guevara, DO   ED visits in past 90 days: 0        Note composed:9:07 PM 05/24/2022

## 2022-06-20 RX ORDER — LEVOTHYROXINE SODIUM 200 UG/1
TABLET ORAL
Qty: 90 TABLET | OUTPATIENT
Start: 2022-06-20

## 2022-06-20 NOTE — TELEPHONE ENCOUNTER
Refill Decision Note   Harriet Rutherford  is requesting a refill authorization.  Brief Assessment and Rationale for Refill:  Quick Discontinue     Medication Therapy Plan:  175 MCG    Medication Reconciliation Completed: No   Comments:     No Care Gaps recommended.     Note composed:7:17 AM 06/20/2022

## 2022-06-20 NOTE — TELEPHONE ENCOUNTER
No new care gaps identified.  [facility] Embedded Care Gaps. Reference number: [id number]. 6/19/2022   11:11:10 PM CDT

## 2022-06-23 DIAGNOSIS — F41.9 ANXIETY AND DEPRESSION: ICD-10-CM

## 2022-06-23 DIAGNOSIS — I15.2 HYPERTENSION DUE TO ENDOCRINE DISORDER: Chronic | ICD-10-CM

## 2022-06-23 DIAGNOSIS — R06.09 DOE (DYSPNEA ON EXERTION): ICD-10-CM

## 2022-06-23 DIAGNOSIS — E03.9 HYPOTHYROIDISM, UNSPECIFIED TYPE: Chronic | ICD-10-CM

## 2022-06-23 DIAGNOSIS — E78.5 HYPERLIPIDEMIA, UNSPECIFIED HYPERLIPIDEMIA TYPE: Chronic | ICD-10-CM

## 2022-06-23 DIAGNOSIS — F32.A ANXIETY AND DEPRESSION: ICD-10-CM

## 2022-06-23 RX ORDER — ATORVASTATIN CALCIUM 40 MG/1
TABLET, FILM COATED ORAL
Qty: 90 TABLET | Refills: 1 | Status: SHIPPED | OUTPATIENT
Start: 2022-06-23 | End: 2022-10-22

## 2022-06-23 NOTE — TELEPHONE ENCOUNTER
No new care gaps identified.  [facility] Embedded Care Gaps. Reference number: [id number]. 6/23/2022   10:25:00 AM CDT

## 2022-06-23 NOTE — TELEPHONE ENCOUNTER
Refill Decision Note   Harriet Rutherford  is requesting a refill authorization.  Brief Assessment and Rationale for Refill:  Approve     Medication Therapy Plan:       Medication Reconciliation Completed: No   Comments:     No Care Gaps recommended.     Note composed:10:40 AM 06/23/2022

## 2022-07-28 ENCOUNTER — PATIENT MESSAGE (OUTPATIENT)
Dept: ADMINISTRATIVE | Facility: OTHER | Age: 87
End: 2022-07-28
Payer: COMMERCIAL

## 2022-08-11 NOTE — PROGRESS NOTES
Last 5 Patient Entered Readings                                      Current 30 Day Average: 127/70     Recent Readings 9/17/2018 9/17/2018 9/12/2018 9/10/2018 9/8/2018    SBP (mmHg) 113 109 129 110 126    DBP (mmHg) 65 55 70 62 65    Pulse 91 83 88 87 91        Digital Medicine: Health  Follow Up    Lifestyle Modifications:    1.Dietary Modifications (Sodium intake <2,000mg/day, food labels, dining out): Reports she did not receive recipes.  Resent recipes.  Reports no changes in diet.    2.Physical Activity: Has not been able to go by [employer].  Reports no change in PA.    3.Medication Therapy: Patient has been compliant with the medication regimen.    4.Patient has the following medication side effects/concerns: none  (Frequency/Alleviating factors/Precipitating factors, etc.)     Follow up with Mrs. Harriet Rutherford completed. No further questions or concerns. Will continue to follow up to achieve health goals.      
Awake

## 2022-08-18 RX ORDER — TEMAZEPAM 30 MG/1
CAPSULE ORAL
Qty: 90 CAPSULE | Refills: 0 | Status: SHIPPED | OUTPATIENT
Start: 2022-08-18 | End: 2022-11-18

## 2022-08-18 NOTE — TELEPHONE ENCOUNTER
No new care gaps identified.  [facility] Embedded Care Gaps. Reference number: [id number]. 8/18/2022   10:34:47 AM GORGET

## 2022-08-24 ENCOUNTER — PATIENT MESSAGE (OUTPATIENT)
Dept: INTERNAL MEDICINE | Facility: CLINIC | Age: 87
End: 2022-08-24
Payer: COMMERCIAL

## 2022-08-24 ENCOUNTER — OFFICE VISIT (OUTPATIENT)
Dept: URGENT CARE | Facility: CLINIC | Age: 87
End: 2022-08-24
Payer: COMMERCIAL

## 2022-08-24 VITALS
SYSTOLIC BLOOD PRESSURE: 129 MMHG | WEIGHT: 215 LBS | DIASTOLIC BLOOD PRESSURE: 87 MMHG | OXYGEN SATURATION: 96 % | RESPIRATION RATE: 18 BRPM | HEIGHT: 67 IN | TEMPERATURE: 99 F | HEART RATE: 116 BPM | BODY MASS INDEX: 33.74 KG/M2

## 2022-08-24 DIAGNOSIS — H00.012 HORDEOLUM EXTERNUM OF RIGHT LOWER EYELID: Primary | ICD-10-CM

## 2022-08-24 PROCEDURE — 1160F PR REVIEW ALL MEDS BY PRESCRIBER/CLIN PHARMACIST DOCUMENTED: ICD-10-PCS | Mod: CPTII,S$GLB,, | Performed by: FAMILY MEDICINE

## 2022-08-24 PROCEDURE — 3074F SYST BP LT 130 MM HG: CPT | Mod: CPTII,S$GLB,, | Performed by: FAMILY MEDICINE

## 2022-08-24 PROCEDURE — 1159F MED LIST DOCD IN RCRD: CPT | Mod: CPTII,S$GLB,, | Performed by: FAMILY MEDICINE

## 2022-08-24 PROCEDURE — 1157F PR ADVANCE CARE PLAN OR EQUIV PRESENT IN MEDICAL RECORD: ICD-10-PCS | Mod: CPTII,S$GLB,, | Performed by: FAMILY MEDICINE

## 2022-08-24 PROCEDURE — 1159F PR MEDICATION LIST DOCUMENTED IN MEDICAL RECORD: ICD-10-PCS | Mod: CPTII,S$GLB,, | Performed by: FAMILY MEDICINE

## 2022-08-24 PROCEDURE — 1126F AMNT PAIN NOTED NONE PRSNT: CPT | Mod: CPTII,S$GLB,, | Performed by: FAMILY MEDICINE

## 2022-08-24 PROCEDURE — 99214 PR OFFICE/OUTPT VISIT, EST, LEVL IV, 30-39 MIN: ICD-10-PCS | Mod: S$GLB,,, | Performed by: FAMILY MEDICINE

## 2022-08-24 PROCEDURE — 1126F PR PAIN SEVERITY QUANTIFIED, NO PAIN PRESENT: ICD-10-PCS | Mod: CPTII,S$GLB,, | Performed by: FAMILY MEDICINE

## 2022-08-24 PROCEDURE — 99214 OFFICE O/P EST MOD 30 MIN: CPT | Mod: S$GLB,,, | Performed by: FAMILY MEDICINE

## 2022-08-24 PROCEDURE — 1160F RVW MEDS BY RX/DR IN RCRD: CPT | Mod: CPTII,S$GLB,, | Performed by: FAMILY MEDICINE

## 2022-08-24 PROCEDURE — 3079F DIAST BP 80-89 MM HG: CPT | Mod: CPTII,S$GLB,, | Performed by: FAMILY MEDICINE

## 2022-08-24 PROCEDURE — 1157F ADVNC CARE PLAN IN RCRD: CPT | Mod: CPTII,S$GLB,, | Performed by: FAMILY MEDICINE

## 2022-08-24 PROCEDURE — 3074F PR MOST RECENT SYSTOLIC BLOOD PRESSURE < 130 MM HG: ICD-10-PCS | Mod: CPTII,S$GLB,, | Performed by: FAMILY MEDICINE

## 2022-08-24 PROCEDURE — 3079F PR MOST RECENT DIASTOLIC BLOOD PRESSURE 80-89 MM HG: ICD-10-PCS | Mod: CPTII,S$GLB,, | Performed by: FAMILY MEDICINE

## 2022-08-24 RX ORDER — GENTAMICIN SULFATE 3 MG/ML
1 SOLUTION/ DROPS OPHTHALMIC EVERY 4 HOURS
Qty: 5 ML | Refills: 0 | Status: SHIPPED | OUTPATIENT
Start: 2022-08-24 | End: 2023-01-10 | Stop reason: CLARIF

## 2022-08-24 RX ORDER — AMOXICILLIN AND CLAVULANATE POTASSIUM 875; 125 MG/1; MG/1
1 TABLET, FILM COATED ORAL 2 TIMES DAILY
Qty: 20 TABLET | Refills: 0 | Status: SHIPPED | OUTPATIENT
Start: 2022-08-24 | End: 2022-09-03

## 2022-08-24 NOTE — PROGRESS NOTES
"Subjective:       Patient ID: Harriet Rutherford is a 87 y.o. female.    Vitals:  height is 5' 7" (1.702 m) and weight is 97.5 kg (215 lb). Her temperature is 99 °F (37.2 °C). Her blood pressure is 129/87 and her pulse is 116 (abnormal). Her respiration is 18 and oxygen saturation is 96%.     Chief Complaint: Eye Problem    This is a 87 y.o. female who presents today with a chief complaint of   Stye on right eye lid. Symptoms started 10 days ago. Applying warm compresses no relief     Eye Problem   The right eye is affected. This is a new problem. The current episode started 1 to 4 weeks ago. The problem occurs constantly. The problem has been gradually worsening. There was no injury mechanism. The pain is at a severity of 0/10. The patient is experiencing no pain. There is no known exposure to pink eye. She does not wear contacts. Associated symptoms include eye redness. Associated symptoms comments: Swollen eye lid . Treatments tried: warm compress. The treatment provided no relief.       Eyes: Positive for eye redness.       Objective:      Physical Exam   Constitutional: She does not appear ill. No distress. obesity  Eyes: Right eye exhibits exudate and hordeolum (right lower lid with surounding swelling and erythema ). Right conjunctiva is injected. Extraocular movement intact   Cardiovascular: Normal rate, regular rhythm, normal heart sounds and normal pulses.   Pulmonary/Chest: Effort normal and breath sounds normal.   Abdominal: Normal appearance. Soft.   Neurological: She is alert.   Nursing note and vitals reviewed.        Assessment:       1. Hordeolum externum of right lower eyelid        Given size of stye suspect lesion will need to be lanced. Advised continued warm compresses and early follow up with optometry.   Plan:         Hordeolum externum of right lower eyelid  -     gentamicin (GARAMYCIN) 0.3 % ophthalmic solution; Place 1 drop into the right eye every 4 (four) hours.  Dispense: 5 mL; Refill: 0  -     " amoxicillin-clavulanate 875-125mg (AUGMENTIN) 875-125 mg per tablet; Take 1 tablet by mouth 2 (two) times daily. for 10 days  Dispense: 20 tablet; Refill: 0  -     Ambulatory referral/consult to Optometry

## 2022-08-25 ENCOUNTER — OFFICE VISIT (OUTPATIENT)
Dept: OPTOMETRY | Facility: CLINIC | Age: 87
End: 2022-08-25
Payer: COMMERCIAL

## 2022-08-25 DIAGNOSIS — H00.012 HORDEOLUM EXTERNUM OF RIGHT LOWER EYELID: Primary | ICD-10-CM

## 2022-08-25 PROCEDURE — 1157F PR ADVANCE CARE PLAN OR EQUIV PRESENT IN MEDICAL RECORD: ICD-10-PCS | Mod: CPTII,S$GLB,, | Performed by: OPTOMETRIST

## 2022-08-25 PROCEDURE — 1159F MED LIST DOCD IN RCRD: CPT | Mod: CPTII,S$GLB,, | Performed by: OPTOMETRIST

## 2022-08-25 PROCEDURE — 1157F ADVNC CARE PLAN IN RCRD: CPT | Mod: CPTII,S$GLB,, | Performed by: OPTOMETRIST

## 2022-08-25 PROCEDURE — 1160F PR REVIEW ALL MEDS BY PRESCRIBER/CLIN PHARMACIST DOCUMENTED: ICD-10-PCS | Mod: CPTII,S$GLB,, | Performed by: OPTOMETRIST

## 2022-08-25 PROCEDURE — 92012 INTRM OPH EXAM EST PATIENT: CPT | Mod: S$GLB,,, | Performed by: OPTOMETRIST

## 2022-08-25 PROCEDURE — 1160F RVW MEDS BY RX/DR IN RCRD: CPT | Mod: CPTII,S$GLB,, | Performed by: OPTOMETRIST

## 2022-08-25 PROCEDURE — 92012 PR EYE EXAM, EST PATIENT,INTERMED: ICD-10-PCS | Mod: S$GLB,,, | Performed by: OPTOMETRIST

## 2022-08-25 PROCEDURE — 1159F PR MEDICATION LIST DOCUMENTED IN MEDICAL RECORD: ICD-10-PCS | Mod: CPTII,S$GLB,, | Performed by: OPTOMETRIST

## 2022-08-25 PROCEDURE — 99999 PR PBB SHADOW E&M-EST. PATIENT-LVL II: ICD-10-PCS | Mod: PBBFAC,,, | Performed by: OPTOMETRIST

## 2022-08-25 PROCEDURE — 99999 PR PBB SHADOW E&M-EST. PATIENT-LVL II: CPT | Mod: PBBFAC,,, | Performed by: OPTOMETRIST

## 2022-08-25 NOTE — PROGRESS NOTES
HPI     86 Y/o female is here with C/o stye on OD eye is red and swollen lower   eyelid and has a mucus discharge pt state she had pain in the right side   corner of eye urgent care RX  Oral antibiotics and Garamycin pt did not   receive the EYE DROP  Pt states issue has being going on for 2 weeks    No f/f    Eye med: no gtt     Last edited by Irena Fitzpatrick MA on 8/25/2022  3:12 PM. (History)        ROS     Positive for: Eyes (cat surgery OU/YAG OU)    Negative for: Constitutional, Gastrointestinal, Neurological, Skin,   Genitourinary, Musculoskeletal, HENT, Endocrine, Cardiovascular,   Respiratory, Psychiatric, Allergic/Imm, Heme/Lymph    Last edited by Tray Stratton, OD on 8/25/2022  3:22 PM. (History)        Assessment /Plan     For exam results, see Encounter Report.    Hordeolum externum of right lower eyelid      Ext kayley RLL--pt reports it drained a little last night.  Went to [facility] and given AUGMENTIN and GENT drops, but pharmacy didn't have drops so she just started the pills last night    PLAN:    1) Hot compresses, 5 minutes at a time, QID, followed by AUGMENTIN PO BID, for one week. I don't think she needds the GENT drops  2) Discussed chalazion formation  3) Pt will call if not resolved in one week, and will schedule excision.  Otherwise rtc for routine

## 2022-09-22 NOTE — PROGRESS NOTES
Subjective:       Patient ID: Harriet Rutherford is a 87 y.o. female.    Chief Complaint: Annual Exam    Patient is a 87 y.o.female with PAD who presents today for follow up  Chronic medical issues:     Hypothyroidism:  currently on 200 mcg synthroid     Depression: she is taking   lexapro 20 and wellbutrin 150 mg. Feels much better.      HTN: stable; no chest pain or SOB     HLD: Stable on statin     Stress incontinence: Follows with Dr. Kumar        Leg swelling: improved; none currently; eliminated all salt from her diet        Health Maintenance     Labs: reviewed  Breast Cancer: July 2020  Cervical Cancer: Pap Smear s/p JOSSUE   Colorectal Cancer: Colonoscopy 2016, due 2021; pt will schedule  DEXA: sept 2020    Sense of balance is poor. Using a cane at times.    Will get spells of dizziness and resolves after a few minutes    (+) fatigue  Review of Systems   Constitutional:  Negative for appetite change, chills, diaphoresis and fever.   HENT:  Negative for congestion, ear discharge, ear pain, postnasal drip, tinnitus, trouble swallowing and voice change.    Eyes:  Negative for discharge, redness and itching.   Respiratory:  Negative for cough, chest tightness, shortness of breath and wheezing.    Cardiovascular:  Negative for chest pain, palpitations and leg swelling.   Gastrointestinal:  Negative for abdominal pain, constipation, diarrhea, nausea and vomiting.   Endocrine: Negative for cold intolerance and heat intolerance.   Genitourinary:  Negative for difficulty urinating, flank pain, hematuria and urgency.   Musculoskeletal:  Negative for arthralgias, gait problem, myalgias and neck stiffness.   Skin:  Negative for color change and rash.   Neurological:  Negative for dizziness, seizures, syncope and headaches.   Hematological:  Negative for adenopathy.   Psychiatric/Behavioral:  Negative for agitation, behavioral problems, confusion and sleep disturbance.      Objective:      Physical Exam  Vitals and nursing note  reviewed.   Constitutional:       General: She is not in acute distress.     Appearance: She is well-developed. She is not diaphoretic.   HENT:      Head: Normocephalic and atraumatic.      Right Ear: External ear normal.      Left Ear: External ear normal.      Nose: Nose normal.      Mouth/Throat:      Pharynx: No oropharyngeal exudate.   Eyes:      General: No scleral icterus.        Right eye: No discharge.         Left eye: No discharge.      Conjunctiva/sclera: Conjunctivae normal.      Pupils: Pupils are equal, round, and reactive to light.   Neck:      Thyroid: No thyromegaly.      Vascular: No JVD.      Trachea: No tracheal deviation.   Cardiovascular:      Rate and Rhythm: Normal rate.      Heart sounds: Normal heart sounds. No murmur heard.    No friction rub. No gallop.   Pulmonary:      Effort: Pulmonary effort is normal. No respiratory distress.      Breath sounds: Normal breath sounds. No stridor. No wheezing or rales.   Chest:      Chest wall: No tenderness.   Abdominal:      General: Bowel sounds are normal. There is no distension.      Palpations: Abdomen is soft.      Tenderness: There is no abdominal tenderness. There is no rebound.   Musculoskeletal:         General: No tenderness.      Cervical back: Neck supple.   Lymphadenopathy:      Cervical: No cervical adenopathy.   Skin:     General: Skin is warm and dry.      Findings: No erythema or rash.   Neurological:      Mental Status: She is alert and oriented to person, place, and time.   Psychiatric:         Behavior: Behavior normal.       Assessment and Plan:       1. Hyperlipidemia, unspecified hyperlipidemia type  On statin  - CBC Auto Differential; Future  - Comprehensive Metabolic Panel; Future  - Hemoglobin A1C; Future  - Lipid Panel; Future  - TSH; Future  - Urinalysis; Future  - Vitamin D; Future  - Mammo Digital Screening Bilat w/ Tye; Future  - DXA Bone Density Spine And Hip; Future  - VITAMIN B12; Future    2. Hypothyroidism,  unspecified type  On synthroid  - CBC Auto Differential; Future  - Comprehensive Metabolic Panel; Future  - Hemoglobin A1C; Future  - Lipid Panel; Future  - TSH; Future  - Urinalysis; Future  - Vitamin D; Future  - Mammo Digital Screening Bilat w/ Tye; Future  - DXA Bone Density Spine And Hip; Future  - VITAMIN B12; Future    3. Stage 3 chronic kidney disease, unspecified whether stage 3a or 3b CKD    - CBC Auto Differential; Future  - Comprehensive Metabolic Panel; Future  - Hemoglobin A1C; Future  - Lipid Panel; Future  - TSH; Future  - Urinalysis; Future  - Vitamin D; Future  - Mammo Digital Screening Bilat w/ Tye; Future  - DXA Bone Density Spine And Hip; Future  - VITAMIN B12; Future    4. Balance disorder  Declines PT again; has rollator and cane; wants handicap tag renewed  - CBC Auto Differential; Future  - Comprehensive Metabolic Panel; Future  - Hemoglobin A1C; Future  - Lipid Panel; Future  - TSH; Future  - Urinalysis; Future  - Vitamin D; Future  - Mammo Digital Screening Bilat w/ Tye; Future  - DXA Bone Density Spine And Hip; Future  - VITAMIN B12; Future    5. Anxiety and depression  stable  - CBC Auto Differential; Future  - Comprehensive Metabolic Panel; Future  - Hemoglobin A1C; Future  - Lipid Panel; Future  - TSH; Future  - Urinalysis; Future  - Vitamin D; Future  - Mammo Digital Screening Bilat w/ Tye; Future  - DXA Bone Density Spine And Hip; Future  - VITAMIN B12; Future    6. Visit for screening mammogram    - CBC Auto Differential; Future  - Comprehensive Metabolic Panel; Future  - Hemoglobin A1C; Future  - Lipid Panel; Future  - TSH; Future  - Urinalysis; Future  - Vitamin D; Future  - Mammo Digital Screening Bilat w/ Tye; Future  - DXA Bone Density Spine And Hip; Future  - VITAMIN B12; Future    7. Postmenopausal    - CBC Auto Differential; Future  - Comprehensive Metabolic Panel; Future  - Hemoglobin A1C; Future  - Lipid Panel; Future  - TSH; Future  - Urinalysis; Future  - Vitamin D;  Future  - Mammo Digital Screening Bilat w/ Tye; Future  - DXA Bone Density Spine And Hip; Future  - VITAMIN B12; Future    8. Hypertension due to endocrine disorder  stable    9. ALMENDAREZ (dyspnea on exertion)  resolved    10. Memory change    - donepeziL (ARICEPT) 5 MG tablet; Take 1 tablet (5 mg total) by mouth every evening.  Dispense: 90 tablet; Refill: 3    11. PAD (peripheral artery disease)  On statin; montior; stable        No follow-ups on file.

## 2022-09-27 ENCOUNTER — OFFICE VISIT (OUTPATIENT)
Dept: INTERNAL MEDICINE | Facility: CLINIC | Age: 87
End: 2022-09-27
Payer: COMMERCIAL

## 2022-09-27 ENCOUNTER — TELEPHONE (OUTPATIENT)
Dept: INTERNAL MEDICINE | Facility: CLINIC | Age: 87
End: 2022-09-27

## 2022-09-27 ENCOUNTER — LAB VISIT (OUTPATIENT)
Dept: LAB | Facility: HOSPITAL | Age: 87
End: 2022-09-27
Attending: INTERNAL MEDICINE
Payer: COMMERCIAL

## 2022-09-27 VITALS
BODY MASS INDEX: 32.01 KG/M2 | RESPIRATION RATE: 15 BRPM | DIASTOLIC BLOOD PRESSURE: 62 MMHG | HEART RATE: 52 BPM | SYSTOLIC BLOOD PRESSURE: 112 MMHG | OXYGEN SATURATION: 99 % | WEIGHT: 204.38 LBS | TEMPERATURE: 98 F

## 2022-09-27 DIAGNOSIS — Z12.31 VISIT FOR SCREENING MAMMOGRAM: ICD-10-CM

## 2022-09-27 DIAGNOSIS — F32.A ANXIETY AND DEPRESSION: ICD-10-CM

## 2022-09-27 DIAGNOSIS — Z78.0 POSTMENOPAUSAL: ICD-10-CM

## 2022-09-27 DIAGNOSIS — R41.3 MEMORY CHANGE: ICD-10-CM

## 2022-09-27 DIAGNOSIS — N18.30 STAGE 3 CHRONIC KIDNEY DISEASE, UNSPECIFIED WHETHER STAGE 3A OR 3B CKD: ICD-10-CM

## 2022-09-27 DIAGNOSIS — F41.9 ANXIETY AND DEPRESSION: ICD-10-CM

## 2022-09-27 DIAGNOSIS — E03.9 HYPOTHYROIDISM, UNSPECIFIED TYPE: ICD-10-CM

## 2022-09-27 DIAGNOSIS — R06.09 DOE (DYSPNEA ON EXERTION): ICD-10-CM

## 2022-09-27 DIAGNOSIS — I15.2 HYPERTENSION DUE TO ENDOCRINE DISORDER: Chronic | ICD-10-CM

## 2022-09-27 DIAGNOSIS — E78.5 HYPERLIPIDEMIA, UNSPECIFIED HYPERLIPIDEMIA TYPE: ICD-10-CM

## 2022-09-27 DIAGNOSIS — R26.89 BALANCE DISORDER: ICD-10-CM

## 2022-09-27 DIAGNOSIS — E03.9 HYPOTHYROIDISM, UNSPECIFIED TYPE: Primary | ICD-10-CM

## 2022-09-27 DIAGNOSIS — I73.9 PAD (PERIPHERAL ARTERY DISEASE): ICD-10-CM

## 2022-09-27 DIAGNOSIS — E78.5 HYPERLIPIDEMIA, UNSPECIFIED HYPERLIPIDEMIA TYPE: Primary | ICD-10-CM

## 2022-09-27 LAB
25(OH)D3+25(OH)D2 SERPL-MCNC: 41 NG/ML (ref 30–96)
ALBUMIN SERPL BCP-MCNC: 4.2 G/DL (ref 3.5–5.2)
ALP SERPL-CCNC: 70 U/L (ref 55–135)
ALT SERPL W/O P-5'-P-CCNC: 28 U/L (ref 10–44)
ANION GAP SERPL CALC-SCNC: 12 MMOL/L (ref 8–16)
AST SERPL-CCNC: 27 U/L (ref 10–40)
BASOPHILS # BLD AUTO: 0.06 K/UL (ref 0–0.2)
BASOPHILS NFR BLD: 0.8 % (ref 0–1.9)
BILIRUB SERPL-MCNC: 0.5 MG/DL (ref 0.1–1)
BUN SERPL-MCNC: 26 MG/DL (ref 8–23)
CALCIUM SERPL-MCNC: 10.4 MG/DL (ref 8.7–10.5)
CHLORIDE SERPL-SCNC: 103 MMOL/L (ref 95–110)
CHOLEST SERPL-MCNC: 176 MG/DL (ref 120–199)
CHOLEST/HDLC SERPL: 3.3 {RATIO} (ref 2–5)
CO2 SERPL-SCNC: 23 MMOL/L (ref 23–29)
CREAT SERPL-MCNC: 1.6 MG/DL (ref 0.5–1.4)
DIFFERENTIAL METHOD: ABNORMAL
EOSINOPHIL # BLD AUTO: 0.3 K/UL (ref 0–0.5)
EOSINOPHIL NFR BLD: 3.1 % (ref 0–8)
ERYTHROCYTE [DISTWIDTH] IN BLOOD BY AUTOMATED COUNT: 13.1 % (ref 11.5–14.5)
EST. GFR  (NO RACE VARIABLE): 31.4 ML/MIN/1.73 M^2
ESTIMATED AVG GLUCOSE: 103 MG/DL (ref 68–131)
GLUCOSE SERPL-MCNC: 94 MG/DL (ref 70–110)
HBA1C MFR BLD: 5.2 % (ref 4–5.6)
HCT VFR BLD AUTO: 42.1 % (ref 37–48.5)
HDLC SERPL-MCNC: 54 MG/DL (ref 40–75)
HDLC SERPL: 30.7 % (ref 20–50)
HGB BLD-MCNC: 13.3 G/DL (ref 12–16)
IMM GRANULOCYTES # BLD AUTO: 0.03 K/UL (ref 0–0.04)
IMM GRANULOCYTES NFR BLD AUTO: 0.4 % (ref 0–0.5)
LDLC SERPL CALC-MCNC: 88.4 MG/DL (ref 63–159)
LYMPHOCYTES # BLD AUTO: 0.8 K/UL (ref 1–4.8)
LYMPHOCYTES NFR BLD: 10.3 % (ref 18–48)
MCH RBC QN AUTO: 30.4 PG (ref 27–31)
MCHC RBC AUTO-ENTMCNC: 31.6 G/DL (ref 32–36)
MCV RBC AUTO: 96 FL (ref 82–98)
MONOCYTES # BLD AUTO: 0.9 K/UL (ref 0.3–1)
MONOCYTES NFR BLD: 11.3 % (ref 4–15)
NEUTROPHILS # BLD AUTO: 5.9 K/UL (ref 1.8–7.7)
NEUTROPHILS NFR BLD: 74.1 % (ref 38–73)
NONHDLC SERPL-MCNC: 122 MG/DL
NRBC BLD-RTO: 0 /100 WBC
PLATELET # BLD AUTO: 374 K/UL (ref 150–450)
PMV BLD AUTO: 10.4 FL (ref 9.2–12.9)
POTASSIUM SERPL-SCNC: 4.3 MMOL/L (ref 3.5–5.1)
PROT SERPL-MCNC: 7.3 G/DL (ref 6–8.4)
RBC # BLD AUTO: 4.37 M/UL (ref 4–5.4)
SODIUM SERPL-SCNC: 138 MMOL/L (ref 136–145)
T4 FREE SERPL-MCNC: 1.27 NG/DL (ref 0.71–1.51)
TRIGL SERPL-MCNC: 168 MG/DL (ref 30–150)
TSH SERPL DL<=0.005 MIU/L-ACNC: 0.17 UIU/ML (ref 0.4–4)
VIT B12 SERPL-MCNC: 660 PG/ML (ref 210–950)
WBC # BLD AUTO: 7.98 K/UL (ref 3.9–12.7)

## 2022-09-27 PROCEDURE — 1159F MED LIST DOCD IN RCRD: CPT | Mod: CPTII,S$GLB,, | Performed by: INTERNAL MEDICINE

## 2022-09-27 PROCEDURE — 1157F PR ADVANCE CARE PLAN OR EQUIV PRESENT IN MEDICAL RECORD: ICD-10-PCS | Mod: CPTII,S$GLB,, | Performed by: INTERNAL MEDICINE

## 2022-09-27 PROCEDURE — 82306 VITAMIN D 25 HYDROXY: CPT | Performed by: INTERNAL MEDICINE

## 2022-09-27 PROCEDURE — 1126F AMNT PAIN NOTED NONE PRSNT: CPT | Mod: CPTII,S$GLB,, | Performed by: INTERNAL MEDICINE

## 2022-09-27 PROCEDURE — 99999 PR PBB SHADOW E&M-EST. PATIENT-LVL V: CPT | Mod: PBBFAC,,, | Performed by: INTERNAL MEDICINE

## 2022-09-27 PROCEDURE — 84439 ASSAY OF FREE THYROXINE: CPT | Performed by: INTERNAL MEDICINE

## 2022-09-27 PROCEDURE — 82607 VITAMIN B-12: CPT | Performed by: INTERNAL MEDICINE

## 2022-09-27 PROCEDURE — 1126F PR PAIN SEVERITY QUANTIFIED, NO PAIN PRESENT: ICD-10-PCS | Mod: CPTII,S$GLB,, | Performed by: INTERNAL MEDICINE

## 2022-09-27 PROCEDURE — 1157F ADVNC CARE PLAN IN RCRD: CPT | Mod: CPTII,S$GLB,, | Performed by: INTERNAL MEDICINE

## 2022-09-27 PROCEDURE — 3078F DIAST BP <80 MM HG: CPT | Mod: CPTII,S$GLB,, | Performed by: INTERNAL MEDICINE

## 2022-09-27 PROCEDURE — 3074F PR MOST RECENT SYSTOLIC BLOOD PRESSURE < 130 MM HG: ICD-10-PCS | Mod: CPTII,S$GLB,, | Performed by: INTERNAL MEDICINE

## 2022-09-27 PROCEDURE — 1101F PR PT FALLS ASSESS DOC 0-1 FALLS W/OUT INJ PAST YR: ICD-10-PCS | Mod: CPTII,S$GLB,, | Performed by: INTERNAL MEDICINE

## 2022-09-27 PROCEDURE — 99214 PR OFFICE/OUTPT VISIT, EST, LEVL IV, 30-39 MIN: ICD-10-PCS | Mod: S$GLB,,, | Performed by: INTERNAL MEDICINE

## 2022-09-27 PROCEDURE — 3288F PR FALLS RISK ASSESSMENT DOCUMENTED: ICD-10-PCS | Mod: CPTII,S$GLB,, | Performed by: INTERNAL MEDICINE

## 2022-09-27 PROCEDURE — 3288F FALL RISK ASSESSMENT DOCD: CPT | Mod: CPTII,S$GLB,, | Performed by: INTERNAL MEDICINE

## 2022-09-27 PROCEDURE — 3078F PR MOST RECENT DIASTOLIC BLOOD PRESSURE < 80 MM HG: ICD-10-PCS | Mod: CPTII,S$GLB,, | Performed by: INTERNAL MEDICINE

## 2022-09-27 PROCEDURE — 80053 COMPREHEN METABOLIC PANEL: CPT | Performed by: INTERNAL MEDICINE

## 2022-09-27 PROCEDURE — 1160F RVW MEDS BY RX/DR IN RCRD: CPT | Mod: CPTII,S$GLB,, | Performed by: INTERNAL MEDICINE

## 2022-09-27 PROCEDURE — 1160F PR REVIEW ALL MEDS BY PRESCRIBER/CLIN PHARMACIST DOCUMENTED: ICD-10-PCS | Mod: CPTII,S$GLB,, | Performed by: INTERNAL MEDICINE

## 2022-09-27 PROCEDURE — 85025 COMPLETE CBC W/AUTO DIFF WBC: CPT | Performed by: INTERNAL MEDICINE

## 2022-09-27 PROCEDURE — 83036 HEMOGLOBIN GLYCOSYLATED A1C: CPT | Performed by: INTERNAL MEDICINE

## 2022-09-27 PROCEDURE — 99214 OFFICE O/P EST MOD 30 MIN: CPT | Mod: S$GLB,,, | Performed by: INTERNAL MEDICINE

## 2022-09-27 PROCEDURE — 36415 COLL VENOUS BLD VENIPUNCTURE: CPT | Mod: PO | Performed by: INTERNAL MEDICINE

## 2022-09-27 PROCEDURE — 80061 LIPID PANEL: CPT | Performed by: INTERNAL MEDICINE

## 2022-09-27 PROCEDURE — 99999 PR PBB SHADOW E&M-EST. PATIENT-LVL V: ICD-10-PCS | Mod: PBBFAC,,, | Performed by: INTERNAL MEDICINE

## 2022-09-27 PROCEDURE — 3074F SYST BP LT 130 MM HG: CPT | Mod: CPTII,S$GLB,, | Performed by: INTERNAL MEDICINE

## 2022-09-27 PROCEDURE — 84443 ASSAY THYROID STIM HORMONE: CPT | Performed by: INTERNAL MEDICINE

## 2022-09-27 PROCEDURE — 1101F PT FALLS ASSESS-DOCD LE1/YR: CPT | Mod: CPTII,S$GLB,, | Performed by: INTERNAL MEDICINE

## 2022-09-27 PROCEDURE — 1159F PR MEDICATION LIST DOCUMENTED IN MEDICAL RECORD: ICD-10-PCS | Mod: CPTII,S$GLB,, | Performed by: INTERNAL MEDICINE

## 2022-09-27 RX ORDER — DONEPEZIL HYDROCHLORIDE 5 MG/1
5 TABLET, FILM COATED ORAL NIGHTLY
Qty: 90 TABLET | Refills: 3 | Status: SHIPPED | OUTPATIENT
Start: 2022-09-27 | End: 2023-07-24 | Stop reason: SDUPTHER

## 2022-09-28 NOTE — TELEPHONE ENCOUNTER
Please inform patient of the following:    -Blood counts are normal  -Electrolytes and liver function are normal  Kidney function shows some declines. Advise increase hydration and repeat lab for this in one week.   -Thyroid function is low; I'd like for her to hold her synthroid on sundays and repeat labs in 8 weeks; tsh ordered  -Cholesterol panel is normal

## 2022-10-04 ENCOUNTER — LAB VISIT (OUTPATIENT)
Dept: LAB | Facility: HOSPITAL | Age: 87
End: 2022-10-04
Attending: INTERNAL MEDICINE
Payer: COMMERCIAL

## 2022-10-04 DIAGNOSIS — N18.30 STAGE 3 CHRONIC KIDNEY DISEASE, UNSPECIFIED WHETHER STAGE 3A OR 3B CKD: ICD-10-CM

## 2022-10-04 LAB
ALBUMIN SERPL BCP-MCNC: 3.9 G/DL (ref 3.5–5.2)
ALP SERPL-CCNC: 72 U/L (ref 55–135)
ALT SERPL W/O P-5'-P-CCNC: 28 U/L (ref 10–44)
ANION GAP SERPL CALC-SCNC: 11 MMOL/L (ref 8–16)
AST SERPL-CCNC: 24 U/L (ref 10–40)
BILIRUB SERPL-MCNC: 0.3 MG/DL (ref 0.1–1)
BUN SERPL-MCNC: 34 MG/DL (ref 8–23)
CALCIUM SERPL-MCNC: 9.7 MG/DL (ref 8.7–10.5)
CHLORIDE SERPL-SCNC: 104 MMOL/L (ref 95–110)
CO2 SERPL-SCNC: 23 MMOL/L (ref 23–29)
CREAT SERPL-MCNC: 1.5 MG/DL (ref 0.5–1.4)
EST. GFR  (NO RACE VARIABLE): 33.9 ML/MIN/1.73 M^2
GLUCOSE SERPL-MCNC: 100 MG/DL (ref 70–110)
POTASSIUM SERPL-SCNC: 4.2 MMOL/L (ref 3.5–5.1)
PROT SERPL-MCNC: 6.6 G/DL (ref 6–8.4)
SODIUM SERPL-SCNC: 138 MMOL/L (ref 136–145)

## 2022-10-04 PROCEDURE — 36415 COLL VENOUS BLD VENIPUNCTURE: CPT | Mod: PO | Performed by: INTERNAL MEDICINE

## 2022-10-04 PROCEDURE — 80053 COMPREHEN METABOLIC PANEL: CPT | Performed by: INTERNAL MEDICINE

## 2022-10-12 ENCOUNTER — PATIENT MESSAGE (OUTPATIENT)
Dept: INTERNAL MEDICINE | Facility: CLINIC | Age: 87
End: 2022-10-12
Payer: COMMERCIAL

## 2022-10-13 ENCOUNTER — PATIENT MESSAGE (OUTPATIENT)
Dept: INTERNAL MEDICINE | Facility: CLINIC | Age: 87
End: 2022-10-13
Payer: COMMERCIAL

## 2022-10-13 NOTE — TELEPHONE ENCOUNTER
Pt is having  body aches, cough , headache,  sinus congestion, clear drainage, she is also weak and tired.   Pt is taking tylenol and Mucinex Dm DULCE   Pt do not have a temp. Pt would like to know what else do you recommend.

## 2022-10-18 NOTE — TELEPHONE ENCOUNTER
Pt is asking when should she resume her daily statin ?, and when can she receive her high dose flu vaccine ? When fully symptom or can she get it now ?

## 2022-10-21 ENCOUNTER — PATIENT MESSAGE (OUTPATIENT)
Dept: UROLOGY | Facility: CLINIC | Age: 87
End: 2022-10-21
Payer: COMMERCIAL

## 2022-11-07 ENCOUNTER — HOSPITAL ENCOUNTER (OUTPATIENT)
Dept: RADIOLOGY | Facility: HOSPITAL | Age: 87
Discharge: HOME | End: 2022-11-07
Attending: INTERNAL MEDICINE
Payer: COMMERCIAL

## 2022-11-07 DIAGNOSIS — N18.30 STAGE 3 CHRONIC KIDNEY DISEASE, UNSPECIFIED WHETHER STAGE 3A OR 3B CKD: ICD-10-CM

## 2022-11-07 DIAGNOSIS — R26.89 BALANCE DISORDER: ICD-10-CM

## 2022-11-07 DIAGNOSIS — F41.9 ANXIETY AND DEPRESSION: ICD-10-CM

## 2022-11-07 DIAGNOSIS — E78.5 HYPERLIPIDEMIA, UNSPECIFIED HYPERLIPIDEMIA TYPE: ICD-10-CM

## 2022-11-07 DIAGNOSIS — Z78.0 POSTMENOPAUSAL: ICD-10-CM

## 2022-11-07 DIAGNOSIS — F32.A ANXIETY AND DEPRESSION: ICD-10-CM

## 2022-11-07 DIAGNOSIS — Z12.31 VISIT FOR SCREENING MAMMOGRAM: ICD-10-CM

## 2022-11-07 DIAGNOSIS — E03.9 HYPOTHYROIDISM, UNSPECIFIED TYPE: ICD-10-CM

## 2022-11-07 PROCEDURE — 77067 SCR MAMMO BI INCL CAD: CPT | Mod: TC,PO

## 2022-11-07 PROCEDURE — 77067 SCR MAMMO BI INCL CAD: CPT | Mod: 26,,, | Performed by: RADIOLOGY

## 2022-11-07 PROCEDURE — 77063 BREAST TOMOSYNTHESIS BI: CPT | Mod: TC,PO

## 2022-11-07 PROCEDURE — 77063 MAMMO DIGITAL SCREENING BILAT WITH TOMO: ICD-10-PCS | Mod: 26,,, | Performed by: RADIOLOGY

## 2022-11-07 PROCEDURE — 77063 BREAST TOMOSYNTHESIS BI: CPT | Mod: 26,,, | Performed by: RADIOLOGY

## 2022-11-07 PROCEDURE — 77067 MAMMO DIGITAL SCREENING BILAT WITH TOMO: ICD-10-PCS | Mod: 26,,, | Performed by: RADIOLOGY

## 2022-11-11 ENCOUNTER — PATIENT MESSAGE (OUTPATIENT)
Dept: INTERNAL MEDICINE | Facility: CLINIC | Age: 87
End: 2022-11-11
Payer: COMMERCIAL

## 2022-11-13 ENCOUNTER — PATIENT MESSAGE (OUTPATIENT)
Dept: INTERNAL MEDICINE | Facility: CLINIC | Age: 87
End: 2022-11-13
Payer: COMMERCIAL

## 2022-11-14 RX ORDER — ALENDRONATE SODIUM 70 MG/1
70 TABLET ORAL
Qty: 4 TABLET | Refills: 11 | Status: SHIPPED | OUTPATIENT
Start: 2022-11-14 | End: 2024-03-22

## 2022-11-15 ENCOUNTER — OFFICE VISIT (OUTPATIENT)
Dept: UROLOGY | Facility: CLINIC | Age: 87
End: 2022-11-15
Payer: COMMERCIAL

## 2022-11-15 VITALS
WEIGHT: 204.38 LBS | DIASTOLIC BLOOD PRESSURE: 58 MMHG | BODY MASS INDEX: 32.08 KG/M2 | SYSTOLIC BLOOD PRESSURE: 108 MMHG | HEART RATE: 87 BPM | HEIGHT: 67 IN

## 2022-11-15 DIAGNOSIS — N39.46 MIXED URINARY INCONTINENCE DUE TO FEMALE GENITAL PROLAPSE: Primary | ICD-10-CM

## 2022-11-15 DIAGNOSIS — N81.9 MIXED URINARY INCONTINENCE DUE TO FEMALE GENITAL PROLAPSE: Primary | ICD-10-CM

## 2022-11-15 DIAGNOSIS — N39.3 STRESS INCONTINENCE: ICD-10-CM

## 2022-11-15 PROCEDURE — 1159F PR MEDICATION LIST DOCUMENTED IN MEDICAL RECORD: ICD-10-PCS | Mod: CPTII,S$GLB,, | Performed by: UROLOGY

## 2022-11-15 PROCEDURE — 3288F PR FALLS RISK ASSESSMENT DOCUMENTED: ICD-10-PCS | Mod: CPTII,S$GLB,, | Performed by: UROLOGY

## 2022-11-15 PROCEDURE — 99999 PR PBB SHADOW E&M-EST. PATIENT-LVL IV: ICD-10-PCS | Mod: PBBFAC,,, | Performed by: UROLOGY

## 2022-11-15 PROCEDURE — 99999 PR PBB SHADOW E&M-EST. PATIENT-LVL IV: CPT | Mod: PBBFAC,,, | Performed by: UROLOGY

## 2022-11-15 PROCEDURE — 3074F SYST BP LT 130 MM HG: CPT | Mod: CPTII,S$GLB,, | Performed by: UROLOGY

## 2022-11-15 PROCEDURE — 3288F FALL RISK ASSESSMENT DOCD: CPT | Mod: CPTII,S$GLB,, | Performed by: UROLOGY

## 2022-11-15 PROCEDURE — 1101F PT FALLS ASSESS-DOCD LE1/YR: CPT | Mod: CPTII,S$GLB,, | Performed by: UROLOGY

## 2022-11-15 PROCEDURE — 99204 OFFICE O/P NEW MOD 45 MIN: CPT | Mod: S$GLB,,, | Performed by: UROLOGY

## 2022-11-15 PROCEDURE — 1157F ADVNC CARE PLAN IN RCRD: CPT | Mod: CPTII,S$GLB,, | Performed by: UROLOGY

## 2022-11-15 PROCEDURE — 1159F MED LIST DOCD IN RCRD: CPT | Mod: CPTII,S$GLB,, | Performed by: UROLOGY

## 2022-11-15 PROCEDURE — 3078F DIAST BP <80 MM HG: CPT | Mod: CPTII,S$GLB,, | Performed by: UROLOGY

## 2022-11-15 PROCEDURE — 3078F PR MOST RECENT DIASTOLIC BLOOD PRESSURE < 80 MM HG: ICD-10-PCS | Mod: CPTII,S$GLB,, | Performed by: UROLOGY

## 2022-11-15 PROCEDURE — 1157F PR ADVANCE CARE PLAN OR EQUIV PRESENT IN MEDICAL RECORD: ICD-10-PCS | Mod: CPTII,S$GLB,, | Performed by: UROLOGY

## 2022-11-15 PROCEDURE — 1126F AMNT PAIN NOTED NONE PRSNT: CPT | Mod: CPTII,S$GLB,, | Performed by: UROLOGY

## 2022-11-15 PROCEDURE — 99204 PR OFFICE/OUTPT VISIT, NEW, LEVL IV, 45-59 MIN: ICD-10-PCS | Mod: S$GLB,,, | Performed by: UROLOGY

## 2022-11-15 PROCEDURE — 1101F PR PT FALLS ASSESS DOC 0-1 FALLS W/OUT INJ PAST YR: ICD-10-PCS | Mod: CPTII,S$GLB,, | Performed by: UROLOGY

## 2022-11-15 PROCEDURE — 1126F PR PAIN SEVERITY QUANTIFIED, NO PAIN PRESENT: ICD-10-PCS | Mod: CPTII,S$GLB,, | Performed by: UROLOGY

## 2022-11-15 PROCEDURE — 3074F PR MOST RECENT SYSTOLIC BLOOD PRESSURE < 130 MM HG: ICD-10-PCS | Mod: CPTII,S$GLB,, | Performed by: UROLOGY

## 2022-11-15 RX ORDER — CONJUGATED ESTROGENS 0.62 MG/G
1 CREAM VAGINAL EVERY OTHER DAY
Qty: 45 G | Refills: 3 | Status: SHIPPED | OUTPATIENT
Start: 2022-11-15 | End: 2023-07-24

## 2022-11-15 RX ORDER — IMIPRAMINE HYDROCHLORIDE 25 MG/1
25 TABLET, FILM COATED ORAL 3 TIMES DAILY
Qty: 270 TABLET | Refills: 3 | Status: SHIPPED | OUTPATIENT
Start: 2022-11-15 | End: 2023-12-21 | Stop reason: SDUPTHER

## 2022-11-15 RX ORDER — LIDOCAINE HYDROCHLORIDE 20 MG/ML
JELLY TOPICAL ONCE
Status: CANCELLED | OUTPATIENT
Start: 2022-11-15 | End: 2022-11-15

## 2022-11-15 RX ORDER — DOXYCYCLINE HYCLATE 100 MG
100 TABLET ORAL ONCE
Status: CANCELLED | OUTPATIENT
Start: 2022-11-15 | End: 2022-11-15

## 2022-11-15 NOTE — PROGRESS NOTES
CC: urinary incontinence    Harriet Rutherford is a 87 y.o. woman who is here for the evaluation of Other (OAB)    A new pt referred by her PCP, Arpita Guevara, DO   C/o frequency, urgency and urge incontinence.  Uses #4 x 2 pads a day     s/p Monarc Sling surgery done by me back in 08, c/o mixed incontinence. Thus she was started on impramine 25 mg TID and Detrol LA.  With these medications, her urine control is very good.  However, she reported that Detrol LA costs more than $230 a month.  She is interested in alternative medication that is much cheaper than Detrol LA      Urination symptoms: Positive for frequency, urgency, nocturia and mixed urinary incontinence.  Denies flank pain, dysuria, hematuria       Past Medical History:   Diagnosis Date    Breast cyst     Cataract     Chronic back pain     Hepatic steatosis 10/19/2016    Hyperlipidemia LDL goal <130     Hypertension     Hypothyroidism     OP (osteoporosis)     Smoker     Stress incontinence     Urinary tract infection      Past Surgical History:   Procedure Laterality Date    BLADDER SURGERY      lifted     BREAST CYST ASPIRATION      CATARACT EXTRACTION      Both eyes    COLONOSCOPY N/A 2016    Procedure: COLONOSCOPY;  Surgeon: Wander Mahajan MD;  Location: [address]);  Service: Endoscopy;  Laterality: N/A;  Per Dr. Mahajan use Prepopik     CYSTOSCOPY      FOOT SURGERY Right 11-4-15    OSTEOTOMY-METATARSAL    HYSTERECTOMY      OOPHORECTOMY       Social History     Tobacco Use    Smoking status: Former     Packs/day: 1.00     Years: 60.00     Pack years: 60.00     Types: Cigarettes     Quit date: 2016     Years since quittin.3    Smokeless tobacco: Never   Substance Use Topics    Alcohol use: Yes     Alcohol/week: 0.0 standard drinks     Types: 1 - 2 Shots of liquor per week     Comment: RARELY    Drug use: No     Family History   Problem Relation Age of Onset    Cataracts Mother     Cataracts Father     Hypertension Father      Stroke Father     Heart attack Father     Heart disease Father     Heart failure Father     Cataracts Sister     Cancer Brother     Cataracts Brother     Cataracts Maternal Grandmother     Heart attack Paternal Grandfather     Heart disease Paternal Grandfather     Heart failure Paternal Grandfather     Cataracts Sister     Breast cancer Daughter     Breast cancer Daughter     Amblyopia Neg Hx     Blindness Neg Hx     Glaucoma Neg Hx     Macular degeneration Neg Hx     Strabismus Neg Hx     Retinal detachment Neg Hx      Allergy:  Review of patient's allergies indicates:   Allergen Reactions    Lisinopril Other (See Comments)     cough    Sulfur      Other reaction(s): Urticaria    Sulfa (sulfonamide antibiotics) Rash     Outpatient Encounter Medications as of 11/15/2022   Medication Sig Dispense Refill    alendronate (FOSAMAX) 70 MG tablet Take 1 tablet (70 mg total) by mouth every 7 days. 4 tablet 11    amLODIPine (NORVASC) 10 MG tablet TAKE ONE TABLET BY MOUTH EVERY DAY. 90 tablet 1    aspirin (ECOTRIN) 81 MG EC tablet Take 81 mg by mouth once daily.      atorvastatin (LIPITOR) 40 MG tablet Take 1 tablet (40 mg total) by mouth once daily. 90 tablet 3    buPROPion (WELLBUTRIN XL) 150 MG TB24 tablet Take 1 tablet (150 mg total) by mouth once daily. 90 tablet 3    CA CITRATE/MGOX/VIT D3/B6/MIN (CITRACAL PLUS ORAL) Take 1,200 mg by mouth once daily.      donepeziL (ARICEPT) 5 MG tablet Take 1 tablet (5 mg total) by mouth every evening. 90 tablet 3    EScitalopram oxalate (LEXAPRO) 20 MG tablet TAKE ONE TABLET BY MOUTH EVERY DAY. 90 tablet 1    fenofibrate 160 MG Tab TAKE ONE TABLET BY MOUTH EVERY DAY. 90 tablet 0    hydroCHLOROthiazide (HYDRODIURIL) 25 MG tablet Take 0.5 tablets (12.5 mg total) by mouth once daily. 90 tablet 1    hydrOXYzine HCl (ATARAX) 25 MG tablet Take 1 tablet (25 mg total) by mouth 3 (three) times daily as needed for Itching. 60 tablet 3    levothyroxine (SYNTHROID, LEVOTHROID) 175 MCG tablet  TAKE ONE TABLET BY MOUTH EVERY DAY. 30 tablet 11    multivitamin capsule Take 1 capsule by mouth once daily.      temazepam (RESTORIL) 30 mg capsule TAKE ONE CAPSULE BY MOUTH EVERY EVENING 90 capsule 0    valsartan (DIOVAN) 320 MG tablet TAKE ONE TABLET BY MOUTH EVERY DAY. 90 tablet 3    conjugated estrogens (PREMARIN) vaginal cream Place 1 g vaginally every other day. 45 g 3    diclofenac sodium (SOLARAZE) 3 % gel Apply to affected area 4-5 times daily prn pain (Patient not taking: Reported on 9/27/2022) 100 g 3    gentamicin (GARAMYCIN) 0.3 % ophthalmic solution Place 1 drop into the right eye every 4 (four) hours. (Patient not taking: Reported on 9/27/2022) 5 mL 0    imipramine (TOFRANIL) 25 MG tablet Take 1 tablet (25 mg total) by mouth 3 (three) times daily. 270 tablet 3    [DISCONTINUED] imipramine (TOFRANIL) 25 MG tablet TAKE 1 TABLET BY MOUTH THREE TIMES A DAY (Patient not taking: Reported on 11/15/2022) 90 tablet 9     No facility-administered encounter medications on file as of 11/15/2022.     Review of Systems   ROS  Physical Exam     Vitals:    11/15/22 1015   BP: (!) 108/58   Pulse: 87     Physical Exam      LABS:  Lab Results   Component Value Date    CREATININE 1.5 (H) 10/04/2022    CREATININE 1.6 (H) 09/27/2022    CREATININE 1.4 04/19/2022     Urine Culture, Routine   Date Value Ref Range Status   07/19/2017 ESCHERICHIA COLI  >100,000 cfu/ml    Final   02/28/2013   Final    MULTIPLE ORGANISMS ISOLATED. NONE IN PREDOMINANCE. REPEAT IF CLINICALLY NECESSARY.     Hemoglobin A1C   Date Value Ref Range Status   09/27/2022 5.2 4.0 - 5.6 % Final     Comment:     ADA Screening Guidelines:  5.7-6.4%  Consistent with prediabetes  >or=6.5%  Consistent with diabetes    High levels of fetal hemoglobin interfere with the HbA1C  assay. Heterozygous hemoglobin variants (HbS, HgC, etc)do  not significantly interfere with this assay.   However, presence of multiple variants may affect accuracy.     03/05/2014 5.7 4.5 -  6.2 % Final     Radiology:    Assessment and Plan:  Harriet was seen today for other.    Diagnoses and all orders for this visit:    Mixed urinary incontinence due to female genital prolapse  -     Cystoscopy; Future    Stress incontinence  -     conjugated estrogens (PREMARIN) vaginal cream; Place 1 g vaginally every other day.  -     imipramine (TOFRANIL) 25 MG tablet; Take 1 tablet (25 mg total) by mouth 3 (three) times daily.  -     Urine culture; Future    Other orders  -     LIDOcaine HCl 2% urojet  -     doxycycline tablet 100 mg  She appears that she is more bothered by HANSA than urge incontinence.  In addition to imipramine, may consider urethral bulking agent such as Bulkimid.  A brochure given.  I will further evaluate her with cysto.  Start premarin cream applied to the urethra QOD.    In the past, she took Detrol LA and did well combined with imipramine.  Thus even after urethral bulking agent, she may need OAB meds.  However, she has some side effects such as dry mouth and the cost of other OAB meds prevented her from trying more OAB meds.  So may consider sacral neuromodulation.  Information given.    Follow-up:  Follow up cysto.

## 2022-11-22 ENCOUNTER — LAB VISIT (OUTPATIENT)
Dept: LAB | Facility: HOSPITAL | Age: 87
End: 2022-11-22
Attending: INTERNAL MEDICINE
Payer: COMMERCIAL

## 2022-11-22 DIAGNOSIS — E03.9 HYPOTHYROIDISM, UNSPECIFIED TYPE: ICD-10-CM

## 2022-11-22 LAB — TSH SERPL DL<=0.005 MIU/L-ACNC: 0.7 UIU/ML (ref 0.4–4)

## 2022-11-22 PROCEDURE — 84443 ASSAY THYROID STIM HORMONE: CPT | Performed by: INTERNAL MEDICINE

## 2022-11-22 PROCEDURE — 36415 COLL VENOUS BLD VENIPUNCTURE: CPT | Mod: PO | Performed by: INTERNAL MEDICINE

## 2022-12-22 ENCOUNTER — TELEPHONE (OUTPATIENT)
Dept: UROLOGY | Facility: CLINIC | Age: 87
End: 2022-12-22
Payer: COMMERCIAL

## 2022-12-22 ENCOUNTER — PROCEDURE VISIT (OUTPATIENT)
Dept: UROLOGY | Facility: CLINIC | Age: 87
End: 2022-12-22
Payer: COMMERCIAL

## 2022-12-22 VITALS
BODY MASS INDEX: 31.49 KG/M2 | HEART RATE: 93 BPM | RESPIRATION RATE: 18 BRPM | TEMPERATURE: 97 F | HEIGHT: 67 IN | DIASTOLIC BLOOD PRESSURE: 60 MMHG | WEIGHT: 200.63 LBS | SYSTOLIC BLOOD PRESSURE: 128 MMHG

## 2022-12-22 DIAGNOSIS — N81.9 MIXED URINARY INCONTINENCE DUE TO FEMALE GENITAL PROLAPSE: Primary | ICD-10-CM

## 2022-12-22 DIAGNOSIS — N39.46 MIXED URINARY INCONTINENCE DUE TO FEMALE GENITAL PROLAPSE: Primary | ICD-10-CM

## 2022-12-22 DIAGNOSIS — N81.9 MIXED URINARY INCONTINENCE DUE TO FEMALE GENITAL PROLAPSE: ICD-10-CM

## 2022-12-22 DIAGNOSIS — N39.46 MIXED URINARY INCONTINENCE DUE TO FEMALE GENITAL PROLAPSE: ICD-10-CM

## 2022-12-22 PROCEDURE — 52000 CYSTOSCOPY: ICD-10-PCS | Mod: S$GLB,,, | Performed by: UROLOGY

## 2022-12-22 PROCEDURE — 52000 CYSTOURETHROSCOPY: CPT | Mod: S$GLB,,, | Performed by: UROLOGY

## 2022-12-22 RX ORDER — LIDOCAINE HYDROCHLORIDE 20 MG/ML
JELLY TOPICAL ONCE
Status: COMPLETED | OUTPATIENT
Start: 2022-12-22 | End: 2022-12-22

## 2022-12-22 RX ORDER — OXYBUTYNIN CHLORIDE 10 MG/1
10 TABLET, EXTENDED RELEASE ORAL DAILY
Qty: 30 TABLET | Refills: 11 | Status: SHIPPED | OUTPATIENT
Start: 2022-12-22 | End: 2024-02-05 | Stop reason: SDUPTHER

## 2022-12-22 RX ORDER — DOXYCYCLINE HYCLATE 100 MG
100 TABLET ORAL ONCE
Status: COMPLETED | OUTPATIENT
Start: 2022-12-22 | End: 2022-12-22

## 2022-12-22 RX ADMIN — Medication 100 MG: at 09:12

## 2022-12-22 RX ADMIN — LIDOCAINE HYDROCHLORIDE: 20 JELLY TOPICAL at 09:12

## 2022-12-22 NOTE — PATIENT INSTRUCTIONS
What to Expect After a Cystoscopy  For the next 24-48 hours, you may feel a mild burning when you urinate. This burning is normal and expected. Drink plenty of water to dilute the urine to help relieve the burning sensation. You may also see a small amount of blood in your urine after the procedure.    Unless you are already taking antibiotics, you may be given an antibiotic after the test to prevent infection.    Signs and Symptoms to Report  Call the [facility] at [phone] if you develop any of the following:  Fever of 101 degrees or higher  Chills or persistent bleeding  Inability to urinate

## 2022-12-22 NOTE — PROCEDURES
Cystoscopy    Date/Time: 12/22/2022 9:00 AM  Performed by: Patel Kumar MD  Authorized by: Patel Kumar MD     Comments:      Procedure Date:  12/22/2022    Procedure:  Female Cystoscopy:  Pre-op diagnosis: mixed urinary incontinence, s/p Monac sling surgery  Post-op diagnosis: same  Anesthesia: Local  Surgeon:  Patel Kumar MD    Findings:  Urethra:  Normal urethra. Open bladder neck  Bladder Neck: Patent and competent with mobility, hypermobility  Bladder:  Normal bladder.  No tumor or stone seen  Normal ureteral orifices bilaterally.     Description of Procedure:                                                         Informed Consent:                                                            - Risks, benefits and alternatives of procedure discussed with               patient and informed consent obtained.     Patient Position:      - Dorsal lithotomy.   Prep and Drape:      - Patient prepped and draped in usual sterile fashion using povidone iodine (Betadine).   Instruments:      - 16 Fr flexible cystoscope with 0 degree lens.   Procedure Details:      - Cystoscope passed under vision into bladder.        - Bladder and urethra examined in their entirety with findings as above.     Conclusion:  1. Recurrent HANSA  Positive leakage upon standing and straining.  Will plan cysto Bulkimad urethral injeciton.  2. OAB  Start oxybutynin xl 10 mg and Imipramine 25 mg TID    Plan:  Patient was discharged home in a stable condition.  Medications: doxy  Follow up:  surgery    · Mixed urinary incontinence due to female genital prolapse   Cystoscopy   oxybutynin (DITROPAN-XL) 10 MG 24 hr tablet; Take 1 tablet (10 mg total) by mouth once daily.  Dispense: 30 tablet; Refill: 11    · Other orders   LIDOcaine HCl 2% urojet   doxycycline tablet 100 mg

## 2022-12-23 NOTE — TELEPHONE ENCOUNTER
Mixed urinary incontinence due to female genital prolapse  -     Case Request Operating Room: CYSTOSCOPY, WITH PERIURETHRAL BULKING AGENT INJECTION

## 2023-01-09 ENCOUNTER — HOSPITAL ENCOUNTER (OUTPATIENT)
Dept: CARDIOLOGY | Facility: CLINIC | Age: 88
Discharge: HOME | End: 2023-01-09
Payer: COMMERCIAL

## 2023-01-09 ENCOUNTER — LAB VISIT (OUTPATIENT)
Dept: LAB | Facility: HOSPITAL | Age: 88
End: 2023-01-09
Attending: UROLOGY
Payer: COMMERCIAL

## 2023-01-09 DIAGNOSIS — N81.9 MIXED URINARY INCONTINENCE DUE TO FEMALE GENITAL PROLAPSE: ICD-10-CM

## 2023-01-09 DIAGNOSIS — N39.46 MIXED URINARY INCONTINENCE DUE TO FEMALE GENITAL PROLAPSE: ICD-10-CM

## 2023-01-09 LAB
ANION GAP SERPL CALC-SCNC: 9 MMOL/L (ref 8–16)
BASOPHILS # BLD AUTO: 0.06 K/UL (ref 0–0.2)
BASOPHILS NFR BLD: 0.9 % (ref 0–1.9)
BUN SERPL-MCNC: 21 MG/DL (ref 8–23)
CALCIUM SERPL-MCNC: 10.3 MG/DL (ref 8.7–10.5)
CHLORIDE SERPL-SCNC: 101 MMOL/L (ref 95–110)
CO2 SERPL-SCNC: 27 MMOL/L (ref 23–29)
CREAT SERPL-MCNC: 1.2 MG/DL (ref 0.5–1.4)
DIFFERENTIAL METHOD: ABNORMAL
EOSINOPHIL # BLD AUTO: 0.3 K/UL (ref 0–0.5)
EOSINOPHIL NFR BLD: 4 % (ref 0–8)
ERYTHROCYTE [DISTWIDTH] IN BLOOD BY AUTOMATED COUNT: 13.7 % (ref 11.5–14.5)
EST. GFR  (NO RACE VARIABLE): 44.4 ML/MIN/1.73 M^2
GLUCOSE SERPL-MCNC: 89 MG/DL (ref 70–110)
HCT VFR BLD AUTO: 40.2 % (ref 37–48.5)
HGB BLD-MCNC: 12.4 G/DL (ref 12–16)
IMM GRANULOCYTES # BLD AUTO: 0.04 K/UL (ref 0–0.04)
IMM GRANULOCYTES NFR BLD AUTO: 0.6 % (ref 0–0.5)
LYMPHOCYTES # BLD AUTO: 0.8 K/UL (ref 1–4.8)
LYMPHOCYTES NFR BLD: 13.3 % (ref 18–48)
MCH RBC QN AUTO: 30.1 PG (ref 27–31)
MCHC RBC AUTO-ENTMCNC: 30.8 G/DL (ref 32–36)
MCV RBC AUTO: 98 FL (ref 82–98)
MONOCYTES # BLD AUTO: 0.9 K/UL (ref 0.3–1)
MONOCYTES NFR BLD: 14.2 % (ref 4–15)
NEUTROPHILS # BLD AUTO: 4.2 K/UL (ref 1.8–7.7)
NEUTROPHILS NFR BLD: 67 % (ref 38–73)
NRBC BLD-RTO: 0 /100 WBC
PLATELET # BLD AUTO: 376 K/UL (ref 150–450)
PMV BLD AUTO: 9.8 FL (ref 9.2–12.9)
POTASSIUM SERPL-SCNC: 4.3 MMOL/L (ref 3.5–5.1)
RBC # BLD AUTO: 4.12 M/UL (ref 4–5.4)
SODIUM SERPL-SCNC: 137 MMOL/L (ref 136–145)
WBC # BLD AUTO: 6.32 K/UL (ref 3.9–12.7)

## 2023-01-09 PROCEDURE — 93010 EKG 12-LEAD: ICD-10-PCS | Mod: S$GLB,,, | Performed by: INTERNAL MEDICINE

## 2023-01-09 PROCEDURE — 85025 COMPLETE CBC W/AUTO DIFF WBC: CPT | Performed by: UROLOGY

## 2023-01-09 PROCEDURE — 93005 EKG 12-LEAD: ICD-10-PCS | Mod: S$GLB,,, | Performed by: UROLOGY

## 2023-01-09 PROCEDURE — 93010 ELECTROCARDIOGRAM REPORT: CPT | Mod: S$GLB,,, | Performed by: INTERNAL MEDICINE

## 2023-01-09 PROCEDURE — 93005 ELECTROCARDIOGRAM TRACING: CPT | Mod: S$GLB,,, | Performed by: UROLOGY

## 2023-01-09 PROCEDURE — 36415 COLL VENOUS BLD VENIPUNCTURE: CPT | Performed by: UROLOGY

## 2023-01-09 PROCEDURE — 80048 BASIC METABOLIC PNL TOTAL CA: CPT | Performed by: UROLOGY

## 2023-01-10 NOTE — ANESTHESIA PAT ROS NOTE
01/10/2023  Harriet Rutherford is a 87 y.o., female.      Pre-op Assessment          Review of Systems  Anesthesia Hx:  No problems with previous Anesthesia             Denies Family Hx of Anesthesia complications.    Denies Personal Hx of Anesthesia complications.                    Social:  Former Smoker, Social Alcohol Use       Hematology/Oncology:  Hematology Normal                       --  Cancer in past history:       Other (see Oncology comments)       surgery   Oncology Comments: Cervical-Surgery-Complete Hysterectomy     EENT/Dental:   Reading glasses/ x1 Implant/ x1 crown          Cardiovascular:     Hypertension            ALMENDAREZ   Housework/ Gardening/ Grocery shopping                         Pulmonary:      Shortness of breath  Sleep Apnea, CPAP                Renal/:     Urinary incontinence             Hepatic/GI:   PUD,   Liver Disease,    NAFLD          Musculoskeletal:  Musculoskeletal Normal                OB/GYN/PEDS:  S/P Complete Hysterectomy for Cervical cancer           Neurological:  Neurology Normal                                      Endocrine:   Hypothyroidism          Dermatological:  Skin Normal    Psych:  Psychiatric History anxiety depression             Lita Salmon RN  1/10/23      Anesthesia Assessment: Preoperative EQUATION    Planned Procedure: Procedure(s) (LRB):  CYSTOSCOPY, WITH PERIURETHRAL BULKING AGENT INJECTION (N/A)  Requested Anesthesia Type:General  Surgeon: Patel Kumar MD  Service: Urology  Known or anticipated Date of Surgery:1/18/2023    Surgeon notes: reviewed and Mixed  urinary incontinence due to female genital prolapse    Previous anesthesia records: 5/19/16-Colonoscopy-General- no apparent anesthetic complications-no nausea/vomiting      Anesthesia Hx:  No problems with previous Anesthesia  History of prior surgery of interest to airway management or  "planning: Previous anesthesia: General Denies Family Hx of Anesthesia complications.   Denies Personal Hx of Anesthesia complications.     Airway/Jaw/Neck:  Airway Findings: Mouth Opening: Small, but > 3cm Tongue: Normal  General Airway Assessment: Adult, Average  Mallampati: III  Improves to II with phonation.  TM Distance: 4 - 6 cm          Last PCP note: 3-6 months ago , within [facility] , 9/27/22- Arpita Jose DO-IM-Hyperlipidemia, unspecified hyperlipidemia type +10 more Dx-Annual Exam  Subspecialty notes: Dermatology, Neurology, Psychiatry, Optometry visit, Sleep Medicine visit    Other important co-morbidities: HLD, HTN, Smoker, and Mixed urinary incontinence due to female genital prolapse     Medical History    Diagnosis Date Comment Source   Breast cyst      Cataract      Chronic back pain      Hepatic steatosis 10/19/2016     Hyperlipidemia LDL goal <130      Hypertension      Hypothyroidism      OP (osteoporosis)      Smoker      Stress incontinence      Urinary tract infection        Tests already available:  Results have been reviewed. EKG-12/22/22/ CXR-9/27/23/ Labs-1/9/23-BMP/CBC/ 11/22/22-TSH                Plan:  Phone pending & LMx4 on 1/10/23    Testing:  None Needed        Patient  has previously scheduled Medical Appointment:None    Navigation: Phone Completed                                    Consults scheduled.Requesting "Clearance" from [facility] NBS-RF-BfetavBecky GuevaraOsiycuu-tzcjudl-Pvssxpt sent on 1/11/23.    Plan reviewed with anesthesia-Dr. TEENA Duran.                            Lita Salmon RN 1/10/23 & 1/11/23    Addendum:"Clearance" statement from Arpita Morataya on 1/11/23:    Addendum:Arpita Guevara DO   Physician  Specialty:  Internal Medicine  Telephone Encounter  Signed  Encounter Date:  1/11/2023                 No contraindications to anesthesia or surgery at this time.       Electronically signed by Arpita Guevara DO at 1/11/2023  8:44 PM    Lita Salmon RN  1/12/23  "

## 2023-01-11 ENCOUNTER — PATIENT MESSAGE (OUTPATIENT)
Dept: INTERNAL MEDICINE | Facility: CLINIC | Age: 88
End: 2023-01-11
Payer: COMMERCIAL

## 2023-01-11 ENCOUNTER — PATIENT MESSAGE (OUTPATIENT)
Dept: SURGERY | Facility: HOSPITAL | Age: 88
End: 2023-01-11
Payer: COMMERCIAL

## 2023-01-11 ENCOUNTER — ANESTHESIA EVENT (OUTPATIENT)
Dept: SURGERY | Facility: HOSPITAL | Age: 88
End: 2023-01-11
Payer: COMMERCIAL

## 2023-01-11 ENCOUNTER — TELEPHONE (OUTPATIENT)
Dept: INTERNAL MEDICINE | Facility: CLINIC | Age: 88
End: 2023-01-11

## 2023-01-11 NOTE — TELEPHONE ENCOUNTER
"----- Message from Lita Salmon RN sent at 1/11/2023  2:07 PM CST -----  Regarding: "Clearance" request  Patient is having a Cystoscopy, with Periurethral Bulking Agent Injection with Dr. Patel Kumar, on 1/18/23. Requesting a "Clearance" statement from Arpita Guadalupe DO, prior to the surgery date. Await your reply. Thank you. Sincerely, Lita Salmon RN [facility] ext. [phone]    "

## 2023-01-12 NOTE — TELEPHONE ENCOUNTER
"----- Message from Lita Salmon RN sent at 1/11/2023  2:07 PM CST -----  Regarding: "Clearance" request  Patient is having a Cystoscopy, with Periurethral Bulking Agent Injection with Dr. Patel Kumar, on 1/18/23. Requesting a "Clearance" statement from Arpita Guadalupe DO, prior to the surgery date. Await your reply. Thank you. Sincerely, Lita Salmon RN [facility] ext. [phone]    "

## 2023-01-13 ENCOUNTER — TELEPHONE (OUTPATIENT)
Dept: INTERNAL MEDICINE | Facility: CLINIC | Age: 88
End: 2023-01-13
Payer: COMMERCIAL

## 2023-01-13 NOTE — TELEPHONE ENCOUNTER
"----- Message from Lita Salmon RN sent at 1/11/2023  2:07 PM CST -----  Regarding: "Clearance" request  Patient is having a Cystoscopy, with Periurethral Bulking Agent Injection with Dr. Patel Kumar, on 1/18/23. Requesting a "Clearance" statement from Arpita Guadalupe DO, prior to the surgery date. Await your reply. Thank you. Sincerely, Lita Salmon RN [facility] ext. [phone]    "

## 2023-01-13 NOTE — TELEPHONE ENCOUNTER
Spoke to pt , pt \[facility]\"" Urologist spoke with  Dr. Guevara and  cleared pt for surgery.

## 2023-01-17 ENCOUNTER — TELEPHONE (OUTPATIENT)
Dept: UROLOGY | Facility: CLINIC | Age: 88
End: 2023-01-17
Payer: COMMERCIAL

## 2023-01-17 NOTE — TELEPHONE ENCOUNTER
Called pt to confirm arrival time of 8:45am for procedure on 1/18/23. Gave pt NPO instructions and gave pt opportunity to ask questions. Pt verbalized understanding.

## 2023-01-18 ENCOUNTER — HOSPITAL ENCOUNTER (OUTPATIENT)
Facility: HOSPITAL | Age: 88
Discharge: HOME | End: 2023-01-18
Attending: UROLOGY | Admitting: UROLOGY
Payer: COMMERCIAL

## 2023-01-18 ENCOUNTER — ANESTHESIA (OUTPATIENT)
Dept: SURGERY | Facility: HOSPITAL | Age: 88
End: 2023-01-18
Payer: COMMERCIAL

## 2023-01-18 VITALS
BODY MASS INDEX: 31.39 KG/M2 | HEART RATE: 77 BPM | WEIGHT: 200 LBS | DIASTOLIC BLOOD PRESSURE: 57 MMHG | OXYGEN SATURATION: 97 % | RESPIRATION RATE: 20 BRPM | TEMPERATURE: 98 F | SYSTOLIC BLOOD PRESSURE: 123 MMHG | HEIGHT: 67 IN

## 2023-01-18 DIAGNOSIS — N39.3 SUI (STRESS URINARY INCONTINENCE, FEMALE): Primary | ICD-10-CM

## 2023-01-18 PROCEDURE — 36000706: Performed by: UROLOGY

## 2023-01-18 PROCEDURE — D9220A PRA ANESTHESIA: ICD-10-PCS | Mod: CRNA,,, | Performed by: REGISTERED NURSE

## 2023-01-18 PROCEDURE — D9220A PRA ANESTHESIA: ICD-10-PCS | Mod: ANES,,, | Performed by: ANESTHESIOLOGY

## 2023-01-18 PROCEDURE — 37000009 HC ANESTHESIA EA ADD 15 MINS: Performed by: UROLOGY

## 2023-01-18 PROCEDURE — 71000015 HC POSTOP RECOV 1ST HR: Performed by: UROLOGY

## 2023-01-18 PROCEDURE — 71000044 HC DOSC ROUTINE RECOVERY FIRST HOUR: Performed by: UROLOGY

## 2023-01-18 PROCEDURE — 25000003 PHARM REV CODE 250: Performed by: REGISTERED NURSE

## 2023-01-18 PROCEDURE — 51715 ENDOSCOPIC INJECTION/IMPLANT: CPT | Mod: ,,, | Performed by: UROLOGY

## 2023-01-18 PROCEDURE — D9220A PRA ANESTHESIA: Mod: CRNA,,, | Performed by: REGISTERED NURSE

## 2023-01-18 PROCEDURE — 63600175 PHARM REV CODE 636 W HCPCS: Performed by: REGISTERED NURSE

## 2023-01-18 PROCEDURE — 36000707: Performed by: UROLOGY

## 2023-01-18 PROCEDURE — D9220A PRA ANESTHESIA: Mod: ANES,,, | Performed by: ANESTHESIOLOGY

## 2023-01-18 PROCEDURE — 37000008 HC ANESTHESIA 1ST 15 MINUTES: Performed by: UROLOGY

## 2023-01-18 PROCEDURE — L8606 SYNTHETIC IMPLNT URINARY 1ML: HCPCS | Performed by: UROLOGY

## 2023-01-18 PROCEDURE — 51715 PR ENDOSCOPIC INJECTION/IMPLANT: ICD-10-PCS | Mod: ,,, | Performed by: UROLOGY

## 2023-01-18 RX ORDER — PROPOFOL 10 MG/ML
INJECTION, EMULSION INTRAVENOUS CONTINUOUS PRN
Status: DISCONTINUED | OUTPATIENT
Start: 2023-01-18 | End: 2023-01-18

## 2023-01-18 RX ORDER — FENTANYL CITRATE 50 UG/ML
25 INJECTION, SOLUTION INTRAMUSCULAR; INTRAVENOUS EVERY 5 MIN PRN
Status: DISCONTINUED | OUTPATIENT
Start: 2023-01-18 | End: 2023-01-18 | Stop reason: HOSPADM

## 2023-01-18 RX ORDER — CEPHALEXIN 500 MG/1
500 CAPSULE ORAL EVERY 12 HOURS
Qty: 10 CAPSULE | Refills: 0 | Status: SHIPPED | OUTPATIENT
Start: 2023-01-18 | End: 2023-01-23

## 2023-01-18 RX ORDER — PROPOFOL 10 MG/ML
VIAL (ML) INTRAVENOUS
Status: DISCONTINUED | OUTPATIENT
Start: 2023-01-18 | End: 2023-01-18

## 2023-01-18 RX ORDER — LIDOCAINE HYDROCHLORIDE 10 MG/ML
1 INJECTION, SOLUTION EPIDURAL; INFILTRATION; INTRACAUDAL; PERINEURAL ONCE
Status: DISCONTINUED | OUTPATIENT
Start: 2023-01-18 | End: 2023-01-18 | Stop reason: HOSPADM

## 2023-01-18 RX ORDER — ONDANSETRON 2 MG/ML
INJECTION INTRAMUSCULAR; INTRAVENOUS
Status: DISCONTINUED | OUTPATIENT
Start: 2023-01-18 | End: 2023-01-18

## 2023-01-18 RX ORDER — LIDOCAINE HYDROCHLORIDE 20 MG/ML
INJECTION INTRAVENOUS
Status: DISCONTINUED | OUTPATIENT
Start: 2023-01-18 | End: 2023-01-18

## 2023-01-18 RX ORDER — FENTANYL CITRATE 50 UG/ML
INJECTION, SOLUTION INTRAMUSCULAR; INTRAVENOUS
Status: DISCONTINUED | OUTPATIENT
Start: 2023-01-18 | End: 2023-01-18

## 2023-01-18 RX ADMIN — PROPOFOL 80 MCG/KG/MIN: 10 INJECTION, EMULSION INTRAVENOUS at 10:01

## 2023-01-18 RX ADMIN — LIDOCAINE HYDROCHLORIDE 40 MG: 20 INJECTION INTRAVENOUS at 10:01

## 2023-01-18 RX ADMIN — FENTANYL CITRATE 25 MCG: 50 INJECTION, SOLUTION INTRAMUSCULAR; INTRAVENOUS at 10:01

## 2023-01-18 RX ADMIN — ONDANSETRON 4 MG: 2 INJECTION INTRAMUSCULAR; INTRAVENOUS at 10:01

## 2023-01-18 RX ADMIN — SODIUM CHLORIDE: 0.9 INJECTION, SOLUTION INTRAVENOUS at 10:01

## 2023-01-18 RX ADMIN — PROPOFOL 30 MG: 10 INJECTION, EMULSION INTRAVENOUS at 10:01

## 2023-01-18 RX ADMIN — FENTANYL CITRATE 50 MCG: 50 INJECTION, SOLUTION INTRAMUSCULAR; INTRAVENOUS at 10:01

## 2023-01-18 NOTE — OP NOTE
[facility]  Operative Note    Date: 01/18/2023    Pre-Op Diagnosis: HANSA    Post-Op Diagnosis: same    Procedure(s) Performed:   1.  Cystoscopy with transurethral injection of urethral bulking agent (Bulkamid)    Specimen(s): none    Staff Surgeon: Patel Kumar MD    Assistant Surgeon: Hilario Keith MD    Anesthesia: Monitored Local Anesthesia with Sedation    Indications: Harriet Rutherford is a 87 y.o. female with stress urinary incontinence.      Findings: good coaptation of the urethra    Estimated Blood Loss: min    Drains: none    Procedure in Detail: After informed consent was obtained, the patient was taken to the cystoscopy suite and placed in the supine position. SCDs were applied and working. Anesthesia was administered. Once the patient was adequately sedated she was placed in dorsal lithotomy position and prepped and draped in the usual sterile fashion. Preoperative timeout was performed, and preoperative antibiotics were confirmed.    The short 0 degree cystoscope lens in the disposable Bulkamid injection sheath was inserted into the urethra and advanced into the urinary bladder. Formal cystoscopy revealed normal bladder mucosa. The ureteral orifices were in the normal anatomic position bilaterally, effluxing clear urine. The rigid needle was inserted into the scope and the scope was backed out into the urethra. Keeping the needle parallel to the urethra, the needle was inserted into the submucosa. Bulkamid was injected at the 5 and 7 o'clock regions transurethrally. This was repeated at the 12 o'clock position. Good coaptation of the urethra was seen after injection. The scope was removed from the bladder.    The patient tolerated the procedure well and was transferred to recovery in stable condition.       Plan:  The patient will void prior to discharge. She was given prescriptions for keflex.  She will follow up with Dr. Kumar in 3 months.      Hilario Keith MD

## 2023-01-18 NOTE — BRIEF OP NOTE
[facility] - Surgery ([facility])  Brief Operative Note    Surgery Date: 1/18/2023     Surgeon(s) and Role:     * Patel Kumar MD - Primary     * Hilario Keith MD - Resident - Assisting     * Godwin Thapa MD - Resident - Assisting        Pre-op Diagnosis:  Mixed urinary incontinence due to female genital prolapse [N39.46, N81.9]    Post-op Diagnosis:  Post-Op Diagnosis Codes:     * Mixed urinary incontinence due to female genital prolapse [N39.46, N81.9]    Procedure(s) (LRB):  CYSTOSCOPY, WITH PERIURETHRAL BULKING AGENT INJECTION (N/A)    Anesthesia: General    Operative Findings:   - Good coaptation of urethra    Estimated Blood Loss: * No values recorded between 1/18/2023 10:22 AM and 1/18/2023 10:40 AM *         Specimens:   Specimen (24h ago, onward)      None              Discharge Note    OUTCOME: Patient tolerated treatment/procedure well without complication and is now ready for discharge.    DISPOSITION: Home or Self Care    FINAL DIAGNOSIS:  HANSA (stress urinary incontinence, female)    FOLLOWUP: In clinic    DISCHARGE INSTRUCTIONS:    Discharge Procedure Orders   Diet Adult Regular     Activity as tolerated        Clinical Reference Documents Added to Patient Instructions         Document    CYSTOSCOPY DISCHARGE INSTRUCTIONS (ENGLISH)

## 2023-01-18 NOTE — PATIENT INSTRUCTIONS
Post Cystoscopy Instructions  Do not strain to have a bowel movement  No strenuous exercise x 7 days  No driving while you are on narcotic pain medications or if your zepeda  catheter is in place    You can expect:  To pass stone fragments if you had a stone procedure  Have pain when you void from your stent if you have a stent in place  See blood in your urine if you have a stent in place    If you have a catheter, please return to the ER if your catheter stops draining or you are having abdominal pain.    Call the doctor if:  Temperature is greater than 101F  Persistent vomiting and inability to keep food down  Inability to void if you do not have a catheter

## 2023-01-18 NOTE — H&P
CC: urinary incontinence     Harriet Rutherford is a 87 y.o. woman who is here for stress urinary incontinence.     A new pt referred by her PCP, Arpita Guevara, DO   C/o frequency, urgency and urge incontinence.  Uses #4 x 2 pads a day      s/p Monarc Sling surgery done by me back in 08, c/o mixed incontinence. Thus she was started on impramine 25 mg TID and Detrol LA.  With these medications, her urine control is very good.  However, she reported that Detrol LA costs more than $230 a month.  She is interested in alternative medication that is much cheaper than Detrol LA       Urination symptoms: Positive for frequency, urgency, nocturia and mixed urinary incontinence.  Denies flank pain, dysuria, hematuria              Past Medical History:   Diagnosis Date    Breast cyst      Cataract      Chronic back pain      Hepatic steatosis 10/19/2016    Hyperlipidemia LDL goal <130      Hypertension      Hypothyroidism      OP (osteoporosis)      Smoker      Stress incontinence      Urinary tract infection              Past Surgical History:   Procedure Laterality Date    BLADDER SURGERY         lifted     BREAST CYST ASPIRATION        CATARACT EXTRACTION         Both eyes    COLONOSCOPY N/A 2016     Procedure: COLONOSCOPY;  Surgeon: Wander Mahajan MD;  Location: [address]);  Service: Endoscopy;  Laterality: N/A;  Per Dr. Mahajan use Prepopik     CYSTOSCOPY        FOOT SURGERY Right 11-4-15     OSTEOTOMY-METATARSAL    HYSTERECTOMY        OOPHORECTOMY          Social History            Tobacco Use    Smoking status: Former       Packs/day: 1.00       Years: 60.00       Pack years: 60.00       Types: Cigarettes       Quit date: 2016       Years since quittin.3    Smokeless tobacco: Never   Substance Use Topics    Alcohol use: Yes       Alcohol/week: 0.0 standard drinks       Types: 1 - 2 Shots of liquor per week       Comment: RARELY    Drug use: No            Family History   Problem Relation Age of  Onset    Cataracts Mother      Cataracts Father      Hypertension Father      Stroke Father      Heart attack Father      Heart disease Father      Heart failure Father      Cataracts Sister      Cancer Brother      Cataracts Brother      Cataracts Maternal Grandmother      Heart attack Paternal Grandfather      Heart disease Paternal Grandfather      Heart failure Paternal Grandfather      Cataracts Sister      Breast cancer Daughter      Breast cancer Daughter      Amblyopia Neg Hx      Blindness Neg Hx      Glaucoma Neg Hx      Macular degeneration Neg Hx      Strabismus Neg Hx      Retinal detachment Neg Hx        Allergy:        Review of patient's allergies indicates:   Allergen Reactions    Lisinopril Other (See Comments)       cough    Sulfur         Other reaction(s): Urticaria    Sulfa (sulfonamide antibiotics) Rash      Encounter Medications          Outpatient Encounter Medications as of 11/15/2022   Medication Sig Dispense Refill    alendronate (FOSAMAX) 70 MG tablet Take 1 tablet (70 mg total) by mouth every 7 days. 4 tablet 11    amLODIPine (NORVASC) 10 MG tablet TAKE ONE TABLET BY MOUTH EVERY DAY. 90 tablet 1    aspirin (ECOTRIN) 81 MG EC tablet Take 81 mg by mouth once daily.        atorvastatin (LIPITOR) 40 MG tablet Take 1 tablet (40 mg total) by mouth once daily. 90 tablet 3    buPROPion (WELLBUTRIN XL) 150 MG TB24 tablet Take 1 tablet (150 mg total) by mouth once daily. 90 tablet 3    CA CITRATE/MGOX/VIT D3/B6/MIN (CITRACAL PLUS ORAL) Take 1,200 mg by mouth once daily.        donepeziL (ARICEPT) 5 MG tablet Take 1 tablet (5 mg total) by mouth every evening. 90 tablet 3    EScitalopram oxalate (LEXAPRO) 20 MG tablet TAKE ONE TABLET BY MOUTH EVERY DAY. 90 tablet 1    fenofibrate 160 MG Tab TAKE ONE TABLET BY MOUTH EVERY DAY. 90 tablet 0    hydroCHLOROthiazide (HYDRODIURIL) 25 MG tablet Take 0.5 tablets (12.5 mg total) by mouth once daily. 90 tablet 1    hydrOXYzine HCl (ATARAX) 25 MG tablet Take 1  tablet (25 mg total) by mouth 3 (three) times daily as needed for Itching. 60 tablet 3    levothyroxine (SYNTHROID, LEVOTHROID) 175 MCG tablet TAKE ONE TABLET BY MOUTH EVERY DAY. 30 tablet 11    multivitamin capsule Take 1 capsule by mouth once daily.        temazepam (RESTORIL) 30 mg capsule TAKE ONE CAPSULE BY MOUTH EVERY EVENING 90 capsule 0    valsartan (DIOVAN) 320 MG tablet TAKE ONE TABLET BY MOUTH EVERY DAY. 90 tablet 3    conjugated estrogens (PREMARIN) vaginal cream Place 1 g vaginally every other day. 45 g 3    diclofenac sodium (SOLARAZE) 3 % gel Apply to affected area 4-5 times daily prn pain (Patient not taking: Reported on 9/27/2022) 100 g 3    gentamicin (GARAMYCIN) 0.3 % ophthalmic solution Place 1 drop into the right eye every 4 (four) hours. (Patient not taking: Reported on 9/27/2022) 5 mL 0    imipramine (TOFRANIL) 25 MG tablet Take 1 tablet (25 mg total) by mouth 3 (three) times daily. 270 tablet 3    [DISCONTINUED] imipramine (TOFRANIL) 25 MG tablet TAKE 1 TABLET BY MOUTH THREE TIMES A DAY (Patient not taking: Reported on 11/15/2022) 90 tablet 9      No facility-administered encounter medications on file as of 11/15/2022.         Physical Exam  Vitals and nursing note reviewed.   Cardiovascular:      Rate and Rhythm: Normal rate.   Pulmonary:      Effort: Pulmonary effort is normal. No respiratory distress.   Abdominal:      General: There is no distension.      Palpations: Abdomen is soft.      Tenderness: There is no abdominal tenderness.   Musculoskeletal:         General: No tenderness.   Skin:     General: Skin is warm and dry.      Findings: No rash.   Neurological:      Mental Status: She is alert and oriented to person, place, and time.         LABS:        Lab Results   Component Value Date     CREATININE 1.5 (H) 10/04/2022     CREATININE 1.6 (H) 09/27/2022     CREATININE 1.4 04/19/2022             Urine Culture, Routine   Date Value Ref Range Status   07/19/2017 ESCHERICHIA  COLI  >100,000 cfu/ml      Final   02/28/2013     Final     MULTIPLE ORGANISMS ISOLATED. NONE IN PREDOMINANCE. REPEAT IF CLINICALLY NECESSARY.              Hemoglobin A1C   Date Value Ref Range Status   09/27/2022 5.2 4.0 - 5.6 % Final       Comment:       ADA Screening Guidelines:  5.7-6.4%  Consistent with prediabetes  >or=6.5%  Consistent with diabetes     High levels of fetal hemoglobin interfere with the HbA1C  assay. Heterozygous hemoglobin variants (HbS, HgC, etc)do  not significantly interfere with this assay.   However, presence of multiple variants may affect accuracy.      03/05/2014 5.7 4.5 - 6.2 % Final      Urine dipstick - negative for all components.     Radiology:     Assessment and Plan:      Stress incontinence  - To OR today for bulkamid injections  - Consent obtained  - Urine dipstick - negative for all components.   - All patient questions answered in pre-op

## 2023-01-18 NOTE — TRANSFER OF CARE
"Anesthesia Transfer of Care Note    Patient: Harriet Rutherford    Procedure(s) Performed: Procedure(s) (LRB):  CYSTOSCOPY, WITH PERIURETHRAL BULKING AGENT INJECTION (N/A)    Patient location: PACU    Anesthesia Type: general    Transport from OR: Transported from OR on 6-10 L/min O2 by face mask with adequate spontaneous ventilation    Post pain: adequate analgesia    Post assessment: no apparent anesthetic complications and tolerated procedure well    Post vital signs: stable    Level of consciousness: responds to stimulation    Nausea/Vomiting: no nausea/vomiting    Complications: none    Transfer of care protocol was followedComments: Nurse at bedside, VSS, spont reg resp noted      Last vitals:   Visit Vitals  BP (!) 95/55   Pulse 77   Temp 36.6 °C (97.9 °F) (Temporal)   Resp 17   Ht 5' 7" (1.702 m)   Wt 90.7 kg (200 lb)   SpO2 100%   Breastfeeding No   BMI 31.32 kg/m²     "

## 2023-01-18 NOTE — ANESTHESIA POSTPROCEDURE EVALUATION
Anesthesia Post Evaluation    Patient: Harriet Rutherford    Procedure(s) Performed: Procedure(s) (LRB):  CYSTOSCOPY, WITH PERIURETHRAL BULKING AGENT INJECTION (N/A)    Final Anesthesia Type: general      Patient location during evaluation: OPS  Patient participation: Yes- Able to Participate  Level of consciousness: awake and alert  Post-procedure vital signs: reviewed and stable  Pain management: adequate  Airway patency: patent    PONV status at discharge: No PONV  Anesthetic complications: no      Cardiovascular status: blood pressure returned to baseline and stable  Respiratory status: unassisted, spontaneous ventilation and room air  Hydration status: euvolemic  Follow-up not needed.          Vitals Value Taken Time   /57 01/18/23 1115   Temp 36.4 °C (97.5 °F) 01/18/23 1045   Pulse 77 01/18/23 1115   Resp 20 01/18/23 1115   SpO2 97 % 01/18/23 1115         No case tracking events are documented in the log.      Pain/Marlena Score: Marlena Score: 9 (1/18/2023 11:00 AM)         Looks like the best that [facility] can do is $90.  As we need to have an evaluation for ADHD, I would advise on getting a psychology evaluation, we can then consider low doses of other ADHD medications. She was given a list of near by counselors.

## 2023-01-18 NOTE — ANESTHESIA PREPROCEDURE EVALUATION
01/18/2023  Harriet Rutherford is a 87 y.o., female.      Pre-op Assessment    I have reviewed the Patient Summary Reports.     I have reviewed the Nursing Notes. I have reviewed the NPO Status.      Review of Systems  Anesthesia Hx:  No problems with previous Anesthesia  Denies Family Hx of Anesthesia complications.   Denies Personal Hx of Anesthesia complications.   Social:  Former Smoker, Social Alcohol Use    Hematology/Oncology:  Hematology Normal       -- Cancer in past history:  Other (see Oncology comments) surgery  Oncology Comments: Cervical-Surgery-Complete Hysterectomy     EENT/Dental:   Reading glasses/ x1 Implant/ x1 crown   Cardiovascular:   Hypertension ALMENDAREZ Housework/ Gardening/ Grocery shopping   Pulmonary:   Shortness of breath Sleep Apnea, CPAP    Renal/:   Urinary incontinence   Hepatic/GI:   PUD, Liver Disease,  NAFLD   Musculoskeletal:  Musculoskeletal Normal    OB/GYN/PEDS:  S/P Complete Hysterectomy for Cervical cancer   Neurological:  Neurology Normal    Endocrine:   Hypothyroidism    Dermatological:  Skin Normal    Psych:   Psychiatric History anxiety depression          Physical Exam  General: Well nourished, Cooperative, Alert and Oriented    Airway:  Mallampati: III   Mouth Opening: Normal  Tongue: Normal  Neck ROM: Extension Decreased    Dental:  Intact    Chest/Lungs:  Clear to auscultation, Normal Respiratory Rate    Heart:  Rate: Normal  Rhythm: Regular Rhythm  Sounds: Normal    Lita Salmon RN  1/10/23      Anesthesia Assessment: Preoperative EQUATION    Planned Procedure: Procedure(s) (LRB):  CYSTOSCOPY, WITH PERIURETHRAL BULKING AGENT INJECTION (N/A)  Requested Anesthesia Type:General  Surgeon: Patel Kumar MD  Service: Urology  Known or anticipated Date of Surgery:1/18/2023    Surgeon notes: reviewed and Mixed  urinary incontinence due to female genital  "prolapse    Previous anesthesia records: 5/19/16-Colonoscopy-General- no apparent anesthetic complications-no nausea/vomiting      Anesthesia Hx:  No problems with previous Anesthesia  History of prior surgery of interest to airway management or planning: Previous anesthesia: General Denies Family Hx of Anesthesia complications.   Denies Personal Hx of Anesthesia complications.     Airway/Jaw/Neck:  Airway Findings: Mouth Opening: Small, but > 3cm Tongue: Normal  General Airway Assessment: Adult, Average  Mallampati: III  Improves to II with phonation.  TM Distance: 4 - 6 cm          Last PCP note: 3-6 months ago , within [facility] , 9/27/22- Arpita Jose DO-IM-Hyperlipidemia, unspecified hyperlipidemia type +10 more Dx-Annual Exam  Subspecialty notes: Dermatology, Neurology, Psychiatry, Optometry visit, Sleep Medicine visit    Other important co-morbidities: HLD, HTN, Smoker, and Mixed urinary incontinence due to female genital prolapse     Medical History    Diagnosis Date Comment Source   Breast cyst      Cataract      Chronic back pain      Hepatic steatosis 10/19/2016     Hyperlipidemia LDL goal <130      Hypertension      Hypothyroidism      OP (osteoporosis)      Smoker      Stress incontinence      Urinary tract infection        Tests already available:  Results have been reviewed. EKG-12/22/22/ CXR-9/27/23/ Labs-1/9/23-BMP/CBC/ 11/22/22-TSH                Plan:  Phone pending & LMx4 on 1/10/23    Testing:  None Needed        Patient  has previously scheduled Medical Appointment:None    Navigation: Phone Completed                                    Consults scheduled.Requesting "Clearance" from [facility] ZHT-OZ-FsmdwtBecky GuevaraJsjeufs-kxwzmpn-Ulgimmv sent on 1/11/23.    Plan reviewed with anesthesia-Dr. TEENA Duran.                            Lita Salmon RN 1/10/23 & 1/11/23    Addendum:"Clearance" statement from Arpita Morataya on 1/11/23:    Addendum:Arpita Guevara DO   Physician  Specialty:  Internal " Medicine  Telephone Encounter  Signed  Encounter Date:  1/11/2023                 No contraindications to anesthesia or surgery at this time.       Electronically signed by Arpita Guevara DO at 1/11/2023  8:44 PM    Lita Salmon RN  1/12/23      Anesthesia Plan  Type of Anesthesia, risks & benefits discussed:    Anesthesia Type: Gen Supraglottic Airway, Gen ETT, Gen Natural Airway  Intra-op Monitoring Plan: Standard ASA Monitors  Induction:  IV  Informed Consent: Informed consent signed with the Patient and all parties understand the risks and agree with anesthesia plan.  All questions answered.   ASA Score: 2  Day of Surgery Review of History & Physical: H&P Update referred to the surgeon/provider.    Ready For Surgery From Anesthesia Perspective.       .

## 2023-01-18 NOTE — PROGRESS NOTES
Patient discharged to home. Discharge instructions verbally given and hard copy provided to patient and daughter. Removed  IV with cath tip in place. Patient tolerated IV removal well with bleeding controlled. Patient remains free of falls with no acute pain or distress noted.     Patient voided 500 ml clear yellow urine post surgery     Awaiting prescriptions to be delivered bedside.

## 2023-04-18 ENCOUNTER — OFFICE VISIT (OUTPATIENT)
Dept: UROLOGY | Facility: CLINIC | Age: 88
End: 2023-04-18
Payer: COMMERCIAL

## 2023-04-18 VITALS
HEIGHT: 67 IN | DIASTOLIC BLOOD PRESSURE: 57 MMHG | HEART RATE: 77 BPM | SYSTOLIC BLOOD PRESSURE: 116 MMHG | BODY MASS INDEX: 32.87 KG/M2 | WEIGHT: 209.44 LBS

## 2023-04-18 DIAGNOSIS — N39.3 SUI (STRESS URINARY INCONTINENCE, FEMALE): Primary | ICD-10-CM

## 2023-04-18 DIAGNOSIS — N32.81 URGENCY-FREQUENCY SYNDROME: ICD-10-CM

## 2023-04-18 PROCEDURE — 1101F PR PT FALLS ASSESS DOC 0-1 FALLS W/OUT INJ PAST YR: ICD-10-PCS | Mod: CPTII,S$GLB,, | Performed by: UROLOGY

## 2023-04-18 PROCEDURE — 99999 PR PBB SHADOW E&M-EST. PATIENT-LVL IV: ICD-10-PCS | Mod: PBBFAC,,, | Performed by: UROLOGY

## 2023-04-18 PROCEDURE — 1157F ADVNC CARE PLAN IN RCRD: CPT | Mod: CPTII,S$GLB,, | Performed by: UROLOGY

## 2023-04-18 PROCEDURE — 1159F MED LIST DOCD IN RCRD: CPT | Mod: CPTII,S$GLB,, | Performed by: UROLOGY

## 2023-04-18 PROCEDURE — 1126F PR PAIN SEVERITY QUANTIFIED, NO PAIN PRESENT: ICD-10-PCS | Mod: CPTII,S$GLB,, | Performed by: UROLOGY

## 2023-04-18 PROCEDURE — 99999 PR PBB SHADOW E&M-EST. PATIENT-LVL IV: CPT | Mod: PBBFAC,,, | Performed by: UROLOGY

## 2023-04-18 PROCEDURE — 3288F FALL RISK ASSESSMENT DOCD: CPT | Mod: CPTII,S$GLB,, | Performed by: UROLOGY

## 2023-04-18 PROCEDURE — 99213 PR OFFICE/OUTPT VISIT, EST, LEVL III, 20-29 MIN: ICD-10-PCS | Mod: S$GLB,,, | Performed by: UROLOGY

## 2023-04-18 PROCEDURE — 3288F PR FALLS RISK ASSESSMENT DOCUMENTED: ICD-10-PCS | Mod: CPTII,S$GLB,, | Performed by: UROLOGY

## 2023-04-18 PROCEDURE — 3078F DIAST BP <80 MM HG: CPT | Mod: CPTII,S$GLB,, | Performed by: UROLOGY

## 2023-04-18 PROCEDURE — 1157F PR ADVANCE CARE PLAN OR EQUIV PRESENT IN MEDICAL RECORD: ICD-10-PCS | Mod: CPTII,S$GLB,, | Performed by: UROLOGY

## 2023-04-18 PROCEDURE — 99213 OFFICE O/P EST LOW 20 MIN: CPT | Mod: S$GLB,,, | Performed by: UROLOGY

## 2023-04-18 PROCEDURE — 1159F PR MEDICATION LIST DOCUMENTED IN MEDICAL RECORD: ICD-10-PCS | Mod: CPTII,S$GLB,, | Performed by: UROLOGY

## 2023-04-18 PROCEDURE — 1126F AMNT PAIN NOTED NONE PRSNT: CPT | Mod: CPTII,S$GLB,, | Performed by: UROLOGY

## 2023-04-18 PROCEDURE — 3074F PR MOST RECENT SYSTOLIC BLOOD PRESSURE < 130 MM HG: ICD-10-PCS | Mod: CPTII,S$GLB,, | Performed by: UROLOGY

## 2023-04-18 PROCEDURE — 1101F PT FALLS ASSESS-DOCD LE1/YR: CPT | Mod: CPTII,S$GLB,, | Performed by: UROLOGY

## 2023-04-18 PROCEDURE — 3074F SYST BP LT 130 MM HG: CPT | Mod: CPTII,S$GLB,, | Performed by: UROLOGY

## 2023-04-18 PROCEDURE — 3078F PR MOST RECENT DIASTOLIC BLOOD PRESSURE < 80 MM HG: ICD-10-PCS | Mod: CPTII,S$GLB,, | Performed by: UROLOGY

## 2023-04-18 NOTE — PROGRESS NOTES
CC: s/p Bulkimed injection for HANSA, hx of mixed urinary incontinence    Harriet Rutherford is a 87 y.o. woman who is here for the evaluation of Follow-up (3 month follow up Urinary incontinence )  Her PCP, Arpita Guevara,      Date: 01/18/2023  Pre-Op Diagnosis: HANSA  Post-Op Diagnosis: same  Procedure(s) Performed:   1.  Cystoscopy with transurethral injection of urethral bulking agent (Bulkamid)  Findings: good coaptation of the urethra    C/o frequency, urgency and urge incontinence.  Uses #4 x 2 pads a day   She is on imipramine TID and oxybutynin xl daily.  She is very pleased with the outcome after urethral bulking injection.    s/p Monarc Sling surgery done by me back in 1/2/08, c/o mixed incontinence. Thus she was started on impramine 25 mg TID and Detrol LA.  With these medications, her urine control is very good.  However, she reported that Detrol LA costs more than $230 a month.  She is interested in alternative medication that is much cheaper than Detrol LA      Urination symptoms: Positive for frequency, urgency, nocturia and mixed urinary incontinence.  Denies flank pain, dysuria, hematuria       Past Medical History:   Diagnosis Date    Breast cyst     Cataract     Chronic back pain     Hepatic steatosis 10/19/2016    Hyperlipidemia LDL goal <130     Hypertension     Hypothyroidism     OP (osteoporosis)     Smoker     Stress incontinence     Urinary tract infection      Past Surgical History:   Procedure Laterality Date    BLADDER SURGERY      lifted     BREAST CYST ASPIRATION      CATARACT EXTRACTION      Both eyes    COLONOSCOPY N/A 5/19/2016    Procedure: COLONOSCOPY;  Surgeon: Wander Mahajan MD;  Location: [address]);  Service: Endoscopy;  Laterality: N/A;  Per Dr. Mahajan use Prepopik     CYSTOSCOPY      CYSTOSCOPY WITH INJECTION OF PERIURETHRAL BULKING AGENT N/A 1/18/2023    Procedure: CYSTOSCOPY, WITH PERIURETHRAL BULKING AGENT INJECTION;  Surgeon: Patel Kumar MD;  Location: [address]  [facility];  Service: Urology;  Laterality: N/A;  30 MIN    FOOT SURGERY Right 11-4-15    OSTEOTOMY-METATARSAL    HYSTERECTOMY      OOPHORECTOMY       Social History     Tobacco Use    Smoking status: Former     Packs/day: 1.00     Years: 60.00     Pack years: 60.00     Types: Cigarettes     Quit date: 2016     Years since quittin.7    Smokeless tobacco: Never   Substance Use Topics    Alcohol use: Yes     Alcohol/week: 0.0 standard drinks     Types: 1 - 2 Shots of liquor per week     Comment: RARELY    Drug use: No     Family History   Problem Relation Age of Onset    Cataracts Mother     Cataracts Father     Hypertension Father     Stroke Father     Heart attack Father     Heart disease Father     Heart failure Father     Cataracts Sister     Cataracts Sister     Cancer Brother     Cataracts Brother     Cataracts Maternal Grandmother     Heart attack Paternal Grandfather     Heart disease Paternal Grandfather     Heart failure Paternal Grandfather     Breast cancer Daughter     Breast cancer Daughter     Amblyopia Neg Hx     Blindness Neg Hx     Glaucoma Neg Hx     Macular degeneration Neg Hx     Strabismus Neg Hx     Retinal detachment Neg Hx     Anesthesia problems Neg Hx      Allergy:  Review of patient's allergies indicates:   Allergen Reactions    Lisinopril Other (See Comments)     cough    Sulfur      Other reaction(s): Urticaria    Sulfa (sulfonamide antibiotics) Rash     Outpatient Encounter Medications as of 2023   Medication Sig Dispense Refill    alendronate (FOSAMAX) 70 MG tablet Take 1 tablet (70 mg total) by mouth every 7 days. 4 tablet 11    amLODIPine (NORVASC) 10 MG tablet TAKE ONE TABLET BY MOUTH EVERY DAY. 90 tablet 1    aspirin (ECOTRIN) 81 MG EC tablet Take 81 mg by mouth once daily.      atorvastatin (LIPITOR) 40 MG tablet Take 1 tablet (40 mg total) by mouth once daily. 90 tablet 3    buPROPion (WELLBUTRIN XL) 150 MG TB24 tablet Take 1 tablet (150 mg total) by mouth once daily. 90  tablet 3    CA CITRATE/MGOX/VIT D3/B6/MIN (CITRACAL PLUS ORAL) Take 1,200 mg by mouth once daily.      diclofenac sodium (SOLARAZE) 3 % gel Apply to affected area 4-5 times daily prn pain 100 g 3    donepeziL (ARICEPT) 5 MG tablet Take 1 tablet (5 mg total) by mouth every evening. 90 tablet 3    EScitalopram oxalate (LEXAPRO) 20 MG tablet TAKE ONE TABLET BY MOUTH EVERY DAY. 90 tablet 1    fenofibrate 160 MG Tab TAKE ONE TABLET BY MOUTH EVERY DAY. 90 tablet 1    hydroCHLOROthiazide (HYDRODIURIL) 25 MG tablet Take 0.5 tablets (12.5 mg total) by mouth once daily. 90 tablet 1    hydrOXYzine HCl (ATARAX) 25 MG tablet Take 1 tablet (25 mg total) by mouth 3 (three) times daily as needed for Itching. 60 tablet 3    imipramine (TOFRANIL) 25 MG tablet Take 1 tablet (25 mg total) by mouth 3 (three) times daily. 270 tablet 3    levothyroxine (SYNTHROID, LEVOTHROID) 175 MCG tablet TAKE ONE TABLET BY MOUTH EVERY DAY. 90 tablet 1    multivitamin capsule Take 1 capsule by mouth once daily.      oxybutynin (DITROPAN-XL) 10 MG 24 hr tablet Take 1 tablet (10 mg total) by mouth once daily. 30 tablet 11    temazepam (RESTORIL) 30 mg capsule Take 1 capsule (30 mg total) by mouth every evening. 90 capsule 0    valsartan (DIOVAN) 320 MG tablet TAKE ONE TABLET BY MOUTH EVERY DAY. 90 tablet 3    conjugated estrogens (PREMARIN) vaginal cream Place 1 g vaginally every other day. 45 g 3     No facility-administered encounter medications on file as of 4/18/2023.     Review of Systems   ROS  Physical Exam     Vitals:    04/18/23 0947   BP: (!) 116/57   Pulse: 77     Physical Exam      LABS:  Lab Results   Component Value Date    CREATININE 1.2 01/09/2023    CREATININE 1.5 (H) 10/04/2022    CREATININE 1.6 (H) 09/27/2022     Urine Culture, Routine   Date Value Ref Range Status   01/09/2023 No significant growth  Final   07/19/2017 ESCHERICHIA COLI  >100,000 cfu/ml    Final     Hemoglobin A1C   Date Value Ref Range Status   09/27/2022 5.2 4.0 - 5.6  % Final     Comment:     ADA Screening Guidelines:  5.7-6.4%  Consistent with prediabetes  >or=6.5%  Consistent with diabetes    High levels of fetal hemoglobin interfere with the HbA1C  assay. Heterozygous hemoglobin variants (HbS, HgC, etc)do  not significantly interfere with this assay.   However, presence of multiple variants may affect accuracy.     03/05/2014 5.7 4.5 - 6.2 % Final     Radiology:    Assessment and Plan:  Harriet was seen today for follow-up.    Diagnoses and all orders for this visit:    HANSA (stress urinary incontinence, female)    Urgency-frequency syndrome      She appears that she is more bothered by HANSA than urge incontinence.  So far she responded well to bulkimed urethral injection.    Recommend to continue imipramine and oxybutynin xl.  Use premarin cream applied to the urethra QOD.    I spent 15 minutes with the patient of which more than half was spent in direct consultation with the patient in regards to our treatment and plan.   Follow-up:  Follow up in about 1 year (around 4/18/2024).

## 2023-05-19 DIAGNOSIS — E78.5 HYPERLIPIDEMIA, UNSPECIFIED HYPERLIPIDEMIA TYPE: Chronic | ICD-10-CM

## 2023-05-19 DIAGNOSIS — I15.2 HYPERTENSION DUE TO ENDOCRINE DISORDER: Chronic | ICD-10-CM

## 2023-05-19 DIAGNOSIS — F41.9 ANXIETY AND DEPRESSION: ICD-10-CM

## 2023-05-19 DIAGNOSIS — R06.09 DOE (DYSPNEA ON EXERTION): ICD-10-CM

## 2023-05-19 DIAGNOSIS — F32.A ANXIETY AND DEPRESSION: ICD-10-CM

## 2023-05-19 DIAGNOSIS — E03.9 HYPOTHYROIDISM, UNSPECIFIED TYPE: Chronic | ICD-10-CM

## 2023-05-19 RX ORDER — AMLODIPINE BESYLATE 10 MG/1
TABLET ORAL
Qty: 90 TABLET | Refills: 1 | Status: SHIPPED | OUTPATIENT
Start: 2023-05-19 | End: 2023-07-24 | Stop reason: SDUPTHER

## 2023-05-23 RX ORDER — TEMAZEPAM 30 MG/1
CAPSULE ORAL
Qty: 90 CAPSULE | Refills: 0 | Status: SHIPPED | OUTPATIENT
Start: 2023-05-23 | End: 2023-08-30

## 2023-05-23 NOTE — TELEPHONE ENCOUNTER
No care due was identified.  [facility] Embedded Care Due Messages. Reference number: [id number].   5/23/2023 12:01:37 PM CDT

## 2023-05-26 NOTE — TELEPHONE ENCOUNTER
----- Message from Madi Navas, [address] Please advised patient she is due for an appointment within the next month (February) to continue getting refills.  It has been since August since patient was last seen.     checked.  No suspicious activity noted.  Refill done.

## 2023-06-12 ENCOUNTER — PATIENT MESSAGE (OUTPATIENT)
Dept: PRIMARY CARE CLINIC | Facility: CLINIC | Age: 88
End: 2023-06-12
Payer: COMMERCIAL

## 2023-06-14 ENCOUNTER — TELEPHONE (OUTPATIENT)
Dept: PODIATRY | Facility: CLINIC | Age: 88
End: 2023-06-14
Payer: COMMERCIAL

## 2023-06-23 ENCOUNTER — OFFICE VISIT (OUTPATIENT)
Dept: PRIMARY CARE CLINIC | Facility: CLINIC | Age: 88
End: 2023-06-23
Payer: COMMERCIAL

## 2023-06-23 VITALS
HEIGHT: 67 IN | OXYGEN SATURATION: 97 % | DIASTOLIC BLOOD PRESSURE: 94 MMHG | HEART RATE: 80 BPM | WEIGHT: 211.88 LBS | BODY MASS INDEX: 33.26 KG/M2 | SYSTOLIC BLOOD PRESSURE: 134 MMHG

## 2023-06-23 DIAGNOSIS — K21.9 GASTROESOPHAGEAL REFLUX DISEASE, UNSPECIFIED WHETHER ESOPHAGITIS PRESENT: Primary | ICD-10-CM

## 2023-06-23 DIAGNOSIS — E03.9 HYPOTHYROIDISM, UNSPECIFIED TYPE: Chronic | ICD-10-CM

## 2023-06-23 PROCEDURE — 1126F AMNT PAIN NOTED NONE PRSNT: CPT | Mod: CPTII,S$GLB,, | Performed by: STUDENT IN AN ORGANIZED HEALTH CARE EDUCATION/TRAINING PROGRAM

## 2023-06-23 PROCEDURE — 1157F PR ADVANCE CARE PLAN OR EQUIV PRESENT IN MEDICAL RECORD: ICD-10-PCS | Mod: CPTII,S$GLB,, | Performed by: STUDENT IN AN ORGANIZED HEALTH CARE EDUCATION/TRAINING PROGRAM

## 2023-06-23 PROCEDURE — 3288F PR FALLS RISK ASSESSMENT DOCUMENTED: ICD-10-PCS | Mod: CPTII,S$GLB,, | Performed by: STUDENT IN AN ORGANIZED HEALTH CARE EDUCATION/TRAINING PROGRAM

## 2023-06-23 PROCEDURE — 1159F MED LIST DOCD IN RCRD: CPT | Mod: CPTII,S$GLB,, | Performed by: STUDENT IN AN ORGANIZED HEALTH CARE EDUCATION/TRAINING PROGRAM

## 2023-06-23 PROCEDURE — 1159F PR MEDICATION LIST DOCUMENTED IN MEDICAL RECORD: ICD-10-PCS | Mod: CPTII,S$GLB,, | Performed by: STUDENT IN AN ORGANIZED HEALTH CARE EDUCATION/TRAINING PROGRAM

## 2023-06-23 PROCEDURE — 1101F PT FALLS ASSESS-DOCD LE1/YR: CPT | Mod: CPTII,S$GLB,, | Performed by: STUDENT IN AN ORGANIZED HEALTH CARE EDUCATION/TRAINING PROGRAM

## 2023-06-23 PROCEDURE — 99214 OFFICE O/P EST MOD 30 MIN: CPT | Mod: S$GLB,,, | Performed by: STUDENT IN AN ORGANIZED HEALTH CARE EDUCATION/TRAINING PROGRAM

## 2023-06-23 PROCEDURE — 3080F PR MOST RECENT DIASTOLIC BLOOD PRESSURE >= 90 MM HG: ICD-10-PCS | Mod: CPTII,S$GLB,, | Performed by: STUDENT IN AN ORGANIZED HEALTH CARE EDUCATION/TRAINING PROGRAM

## 2023-06-23 PROCEDURE — 1126F PR PAIN SEVERITY QUANTIFIED, NO PAIN PRESENT: ICD-10-PCS | Mod: CPTII,S$GLB,, | Performed by: STUDENT IN AN ORGANIZED HEALTH CARE EDUCATION/TRAINING PROGRAM

## 2023-06-23 PROCEDURE — 1157F ADVNC CARE PLAN IN RCRD: CPT | Mod: CPTII,S$GLB,, | Performed by: STUDENT IN AN ORGANIZED HEALTH CARE EDUCATION/TRAINING PROGRAM

## 2023-06-23 PROCEDURE — 1160F PR REVIEW ALL MEDS BY PRESCRIBER/CLIN PHARMACIST DOCUMENTED: ICD-10-PCS | Mod: CPTII,S$GLB,, | Performed by: STUDENT IN AN ORGANIZED HEALTH CARE EDUCATION/TRAINING PROGRAM

## 2023-06-23 PROCEDURE — 99999 PR PBB SHADOW E&M-EST. PATIENT-LVL V: ICD-10-PCS | Mod: PBBFAC,,, | Performed by: STUDENT IN AN ORGANIZED HEALTH CARE EDUCATION/TRAINING PROGRAM

## 2023-06-23 PROCEDURE — 1101F PR PT FALLS ASSESS DOC 0-1 FALLS W/OUT INJ PAST YR: ICD-10-PCS | Mod: CPTII,S$GLB,, | Performed by: STUDENT IN AN ORGANIZED HEALTH CARE EDUCATION/TRAINING PROGRAM

## 2023-06-23 PROCEDURE — 3075F SYST BP GE 130 - 139MM HG: CPT | Mod: CPTII,S$GLB,, | Performed by: STUDENT IN AN ORGANIZED HEALTH CARE EDUCATION/TRAINING PROGRAM

## 2023-06-23 PROCEDURE — 99214 PR OFFICE/OUTPT VISIT, EST, LEVL IV, 30-39 MIN: ICD-10-PCS | Mod: S$GLB,,, | Performed by: STUDENT IN AN ORGANIZED HEALTH CARE EDUCATION/TRAINING PROGRAM

## 2023-06-23 PROCEDURE — 3075F PR MOST RECENT SYSTOLIC BLOOD PRESS GE 130-139MM HG: ICD-10-PCS | Mod: CPTII,S$GLB,, | Performed by: STUDENT IN AN ORGANIZED HEALTH CARE EDUCATION/TRAINING PROGRAM

## 2023-06-23 PROCEDURE — 99999 PR PBB SHADOW E&M-EST. PATIENT-LVL V: CPT | Mod: PBBFAC,,, | Performed by: STUDENT IN AN ORGANIZED HEALTH CARE EDUCATION/TRAINING PROGRAM

## 2023-06-23 PROCEDURE — 1160F RVW MEDS BY RX/DR IN RCRD: CPT | Mod: CPTII,S$GLB,, | Performed by: STUDENT IN AN ORGANIZED HEALTH CARE EDUCATION/TRAINING PROGRAM

## 2023-06-23 PROCEDURE — 3080F DIAST BP >= 90 MM HG: CPT | Mod: CPTII,S$GLB,, | Performed by: STUDENT IN AN ORGANIZED HEALTH CARE EDUCATION/TRAINING PROGRAM

## 2023-06-23 PROCEDURE — 3288F FALL RISK ASSESSMENT DOCD: CPT | Mod: CPTII,S$GLB,, | Performed by: STUDENT IN AN ORGANIZED HEALTH CARE EDUCATION/TRAINING PROGRAM

## 2023-06-23 RX ORDER — PANTOPRAZOLE SODIUM 40 MG/1
40 TABLET, DELAYED RELEASE ORAL DAILY
Qty: 30 TABLET | Refills: 1 | Status: SHIPPED | OUTPATIENT
Start: 2023-06-23 | End: 2023-07-24 | Stop reason: SDUPTHER

## 2023-07-10 ENCOUNTER — PATIENT MESSAGE (OUTPATIENT)
Dept: PODIATRY | Facility: CLINIC | Age: 88
End: 2023-07-10
Payer: COMMERCIAL

## 2023-07-10 NOTE — PROGRESS NOTES
INTERNAL MEDICINE SAME DAY PRIMARY CARE VISIT NOTE    Subjective:     Chief Complaint: Gastroesophageal Reflux       Patient ID: Harriet Rutherford is a 87 y.o. female , here today for focused same-day primary care visit.    Today, patient with complaint of GERD    GERD worse over the last few months. Has taken omeprazole prn. No dysphagia. No weight loss.     Hypothyroidism:  On Synthroid 175 mcg every morning.  Tolerating medication well.  Denies symptoms of Fatigue, Cold intolerance, Weight gain, Constipation, Dry skin, Myalgia  Due for TSH recheck.      Past Medical History:  Past Medical History:   Diagnosis Date    Breast cyst     Cataract     Chronic back pain     Hepatic steatosis 10/19/2016    Hyperlipidemia LDL goal <130     Hypertension     Hypothyroidism     OP (osteoporosis)     Smoker     Stress incontinence     Urinary tract infection        Home Medications:  Prior to Admission medications    Medication Sig Start Date End Date Taking? Authorizing Provider   alendronate (FOSAMAX) 70 MG tablet Take 1 tablet (70 mg total) by mouth every 7 days. 11/14/22 11/14/23 Yes Arpita Guevara, DO   amLODIPine (NORVASC) 10 MG tablet TAKE ONE TABLET BY MOUTH EVERY DAY 5/19/23  Yes Jael Villanueva, FRANKLYN   aspirin (ECOTRIN) 81 MG EC tablet Take 81 mg by mouth once daily.   Yes Historical Provider   atorvastatin (LIPITOR) 40 MG tablet Take 1 tablet (40 mg total) by mouth once daily. 10/22/22  Yes Arpita Guevara, DO   buPROPion (WELLBUTRIN XL) 150 MG TB24 tablet Take 1 tablet (150 mg total) by mouth once daily. 10/23/22  Yes Arpita Guevara, DO   CA CITRATE/MGOX/VIT D3/B6/MIN (CITRACAL PLUS ORAL) Take 1,200 mg by mouth once daily.   Yes Historical Provider   diclofenac sodium (SOLARAZE) 3 % gel Apply to affected area 4-5 times daily prn pain 4/28/21  Yes Arpita Guevara DO   donepeziL (ARICEPT) 5 MG tablet Take 1 tablet (5 mg total) by mouth every evening. 9/27/22 9/27/23 Yes Arpita Guevara, DO   EScitalopram oxalate  (LEXAPRO) 20 MG tablet TAKE ONE TABLET BY MOUTH EVERY DAY. 3/20/23  Yes Arpita Guevara DO   fenofibrate 160 MG Tab TAKE ONE TABLET BY MOUTH EVERY DAY. 3/20/23  Yes Arpita Guevara DO   hydroCHLOROthiazide (HYDRODIURIL) 25 MG tablet Take 0.5 tablets (12.5 mg total) by mouth once daily. 10/4/22  Yes Arpita Guevara DO   hydrOXYzine HCl (ATARAX) 25 MG tablet Take 1 tablet (25 mg total) by mouth 3 (three) times daily as needed for Itching. 17  Yes Arpita Guevara DO   imipramine (TOFRANIL) 25 MG tablet Take 1 tablet (25 mg total) by mouth 3 (three) times daily. 11/15/22 11/15/23 Yes Patel Kumar MD   levothyroxine (SYNTHROID, LEVOTHROID) 175 MCG tablet TAKE ONE TABLET BY MOUTH EVERY DAY. 3/20/23  Yes Arpita Guevara DO   multivitamin capsule Take 1 capsule by mouth once daily.   Yes Historical Provider   oxybutynin (DITROPAN-XL) 10 MG 24 hr tablet Take 1 tablet (10 mg total) by mouth once daily. 22 Yes Patel Kumar MD   temazepam (RESTORIL) 30 mg capsule Take one capsule by mouth every evening 23  Yes Arpita Guevara DO   valsartan (DIOVAN) 320 MG tablet TAKE ONE TABLET BY MOUTH EVERY DAY. 10/5/22  Yes Arpita Guevara DO   conjugated estrogens (PREMARIN) vaginal cream Place 1 g vaginally every other day. 11/15/22 12/15/22  Patel Kumar MD   pantoprazole (PROTONIX) 40 MG tablet Take 1 tablet (40 mg total) by mouth once daily. 23  Manju Hawthorne MD       Allergies:  Review of patient's allergies indicates:   Allergen Reactions    Lisinopril Other (See Comments)     cough    Sulfur      Other reaction(s): Urticaria    Sulfa (sulfonamide antibiotics) Rash       Social History:  Social History     Tobacco Use    Smoking status: Former     Packs/day: 1.00     Years: 60.00     Pack years: 60.00     Types: Cigarettes     Start date: 1950     Quit date: 2016     Years since quittin.9    Smokeless tobacco: Never   Substance Use Topics    Alcohol  "use: Yes     Alcohol/week: 1.0 standard drink     Types: 1 Glasses of wine per week     Comment: RARELY    Drug use: No         Review of Systems   Constitutional:  Negative for diaphoresis, fatigue and fever.   HENT:  Negative for congestion, ear pain and voice change.    Eyes:  Negative for discharge, redness and itching.   Respiratory:  Negative for shortness of breath and wheezing.    Cardiovascular:  Negative for chest pain.   Gastrointestinal:  Negative for abdominal pain.   Musculoskeletal:  Negative for gait problem and joint swelling.   Skin:  Negative for color change, pallor, rash and wound.   Neurological:  Negative for weakness.         Objective:   BP (!) 134/94 (BP Location: Right arm, Patient Position: Sitting, BP Method: Medium (Manual))   Pulse 80   Ht 5' 7" (1.702 m)   Wt 96.1 kg (211 lb 13.8 oz)   SpO2 97%   BMI 33.18 kg/m²        General: AAO x3, no apparent distress  HEENT: PERRL, OP clear  CV: RRR, no m/r/g  Pulm: Lungs CTAB, no crackles, no wheezes  Abd: s/NT/ND +BS  Extremities: no c/c/e    Labs:         Assessment/Plan     Harriet was seen today for gastroesophageal reflux.    Diagnoses and all orders for this visit:    Gastroesophageal reflux disease, unspecified whether esophagitis present  -     pantoprazole (PROTONIX) 40 MG tablet; Take 1 tablet (40 mg total) by mouth once daily.  Patient was instructed to avoid foods that can trigger heartburn symptoms such as acidic foods and spicy foods.  Avoid lying down within 30 minutes after meals.    Hypothyroidism, unspecified type  Stable on medications, continue regimen        RTC prn and with PCP as per routine.    Manju Hawthorne MD  Department of Internal Medicine - [facility]        "

## 2023-07-24 ENCOUNTER — OFFICE VISIT (OUTPATIENT)
Dept: PRIMARY CARE CLINIC | Facility: CLINIC | Age: 88
End: 2023-07-24
Payer: COMMERCIAL

## 2023-07-24 VITALS
DIASTOLIC BLOOD PRESSURE: 60 MMHG | WEIGHT: 211.44 LBS | HEIGHT: 67 IN | BODY MASS INDEX: 33.19 KG/M2 | SYSTOLIC BLOOD PRESSURE: 122 MMHG | OXYGEN SATURATION: 94 % | HEART RATE: 83 BPM

## 2023-07-24 DIAGNOSIS — Z79.899 OTHER LONG TERM (CURRENT) DRUG THERAPY: ICD-10-CM

## 2023-07-24 DIAGNOSIS — I15.2 HYPERTENSION DUE TO ENDOCRINE DISORDER: Chronic | ICD-10-CM

## 2023-07-24 DIAGNOSIS — F32.A ANXIETY AND DEPRESSION: ICD-10-CM

## 2023-07-24 DIAGNOSIS — R54 AGE-RELATED PHYSICAL DEBILITY: ICD-10-CM

## 2023-07-24 DIAGNOSIS — E03.9 HYPOTHYROIDISM, UNSPECIFIED TYPE: Chronic | ICD-10-CM

## 2023-07-24 DIAGNOSIS — R53.1 DECREASED STRENGTH: Primary | ICD-10-CM

## 2023-07-24 DIAGNOSIS — I73.9 PAD (PERIPHERAL ARTERY DISEASE): ICD-10-CM

## 2023-07-24 DIAGNOSIS — I10 HYPERTENSION, UNSPECIFIED TYPE: Chronic | ICD-10-CM

## 2023-07-24 DIAGNOSIS — R41.3 MEMORY CHANGE: ICD-10-CM

## 2023-07-24 DIAGNOSIS — K21.9 GASTROESOPHAGEAL REFLUX DISEASE, UNSPECIFIED WHETHER ESOPHAGITIS PRESENT: ICD-10-CM

## 2023-07-24 DIAGNOSIS — F41.9 ANXIETY AND DEPRESSION: ICD-10-CM

## 2023-07-24 DIAGNOSIS — E78.5 HYPERLIPIDEMIA, UNSPECIFIED HYPERLIPIDEMIA TYPE: Chronic | ICD-10-CM

## 2023-07-24 DIAGNOSIS — M62.81 QUADRICEPS WEAKNESS: ICD-10-CM

## 2023-07-24 DIAGNOSIS — R06.09 DOE (DYSPNEA ON EXERTION): ICD-10-CM

## 2023-07-24 DIAGNOSIS — N18.30 STAGE 3 CHRONIC KIDNEY DISEASE, UNSPECIFIED WHETHER STAGE 3A OR 3B CKD: ICD-10-CM

## 2023-07-24 PROCEDURE — 3288F FALL RISK ASSESSMENT DOCD: CPT | Mod: CPTII,S$GLB,, | Performed by: STUDENT IN AN ORGANIZED HEALTH CARE EDUCATION/TRAINING PROGRAM

## 2023-07-24 PROCEDURE — 1101F PR PT FALLS ASSESS DOC 0-1 FALLS W/OUT INJ PAST YR: ICD-10-PCS | Mod: CPTII,S$GLB,, | Performed by: STUDENT IN AN ORGANIZED HEALTH CARE EDUCATION/TRAINING PROGRAM

## 2023-07-24 PROCEDURE — 1126F PR PAIN SEVERITY QUANTIFIED, NO PAIN PRESENT: ICD-10-PCS | Mod: CPTII,S$GLB,, | Performed by: STUDENT IN AN ORGANIZED HEALTH CARE EDUCATION/TRAINING PROGRAM

## 2023-07-24 PROCEDURE — 99214 OFFICE O/P EST MOD 30 MIN: CPT | Mod: S$GLB,,, | Performed by: STUDENT IN AN ORGANIZED HEALTH CARE EDUCATION/TRAINING PROGRAM

## 2023-07-24 PROCEDURE — 1160F PR REVIEW ALL MEDS BY PRESCRIBER/CLIN PHARMACIST DOCUMENTED: ICD-10-PCS | Mod: CPTII,S$GLB,, | Performed by: STUDENT IN AN ORGANIZED HEALTH CARE EDUCATION/TRAINING PROGRAM

## 2023-07-24 PROCEDURE — 99999 PR PBB SHADOW E&M-EST. PATIENT-LVL V: ICD-10-PCS | Mod: PBBFAC,,, | Performed by: STUDENT IN AN ORGANIZED HEALTH CARE EDUCATION/TRAINING PROGRAM

## 2023-07-24 PROCEDURE — 3074F SYST BP LT 130 MM HG: CPT | Mod: CPTII,S$GLB,, | Performed by: STUDENT IN AN ORGANIZED HEALTH CARE EDUCATION/TRAINING PROGRAM

## 2023-07-24 PROCEDURE — 3078F PR MOST RECENT DIASTOLIC BLOOD PRESSURE < 80 MM HG: ICD-10-PCS | Mod: CPTII,S$GLB,, | Performed by: STUDENT IN AN ORGANIZED HEALTH CARE EDUCATION/TRAINING PROGRAM

## 2023-07-24 PROCEDURE — 1159F MED LIST DOCD IN RCRD: CPT | Mod: CPTII,S$GLB,, | Performed by: STUDENT IN AN ORGANIZED HEALTH CARE EDUCATION/TRAINING PROGRAM

## 2023-07-24 PROCEDURE — 99999 PR PBB SHADOW E&M-EST. PATIENT-LVL V: CPT | Mod: PBBFAC,,, | Performed by: STUDENT IN AN ORGANIZED HEALTH CARE EDUCATION/TRAINING PROGRAM

## 2023-07-24 PROCEDURE — 1160F RVW MEDS BY RX/DR IN RCRD: CPT | Mod: CPTII,S$GLB,, | Performed by: STUDENT IN AN ORGANIZED HEALTH CARE EDUCATION/TRAINING PROGRAM

## 2023-07-24 PROCEDURE — 3074F PR MOST RECENT SYSTOLIC BLOOD PRESSURE < 130 MM HG: ICD-10-PCS | Mod: CPTII,S$GLB,, | Performed by: STUDENT IN AN ORGANIZED HEALTH CARE EDUCATION/TRAINING PROGRAM

## 2023-07-24 PROCEDURE — 3078F DIAST BP <80 MM HG: CPT | Mod: CPTII,S$GLB,, | Performed by: STUDENT IN AN ORGANIZED HEALTH CARE EDUCATION/TRAINING PROGRAM

## 2023-07-24 PROCEDURE — 99214 PR OFFICE/OUTPT VISIT, EST, LEVL IV, 30-39 MIN: ICD-10-PCS | Mod: S$GLB,,, | Performed by: STUDENT IN AN ORGANIZED HEALTH CARE EDUCATION/TRAINING PROGRAM

## 2023-07-24 PROCEDURE — 1157F PR ADVANCE CARE PLAN OR EQUIV PRESENT IN MEDICAL RECORD: ICD-10-PCS | Mod: CPTII,S$GLB,, | Performed by: STUDENT IN AN ORGANIZED HEALTH CARE EDUCATION/TRAINING PROGRAM

## 2023-07-24 PROCEDURE — 1159F PR MEDICATION LIST DOCUMENTED IN MEDICAL RECORD: ICD-10-PCS | Mod: CPTII,S$GLB,, | Performed by: STUDENT IN AN ORGANIZED HEALTH CARE EDUCATION/TRAINING PROGRAM

## 2023-07-24 PROCEDURE — 1126F AMNT PAIN NOTED NONE PRSNT: CPT | Mod: CPTII,S$GLB,, | Performed by: STUDENT IN AN ORGANIZED HEALTH CARE EDUCATION/TRAINING PROGRAM

## 2023-07-24 PROCEDURE — 3288F PR FALLS RISK ASSESSMENT DOCUMENTED: ICD-10-PCS | Mod: CPTII,S$GLB,, | Performed by: STUDENT IN AN ORGANIZED HEALTH CARE EDUCATION/TRAINING PROGRAM

## 2023-07-24 PROCEDURE — 1157F ADVNC CARE PLAN IN RCRD: CPT | Mod: CPTII,S$GLB,, | Performed by: STUDENT IN AN ORGANIZED HEALTH CARE EDUCATION/TRAINING PROGRAM

## 2023-07-24 PROCEDURE — 1101F PT FALLS ASSESS-DOCD LE1/YR: CPT | Mod: CPTII,S$GLB,, | Performed by: STUDENT IN AN ORGANIZED HEALTH CARE EDUCATION/TRAINING PROGRAM

## 2023-07-24 RX ORDER — DONEPEZIL HYDROCHLORIDE 5 MG/1
5 TABLET, FILM COATED ORAL NIGHTLY
Qty: 90 TABLET | Refills: 3 | Status: SHIPPED | OUTPATIENT
Start: 2023-07-24 | End: 2024-02-05 | Stop reason: SDUPTHER

## 2023-07-24 RX ORDER — VALSARTAN 320 MG/1
320 TABLET ORAL DAILY
Qty: 90 TABLET | Refills: 3 | Status: SHIPPED | OUTPATIENT
Start: 2023-07-24 | End: 2024-02-05 | Stop reason: SDUPTHER

## 2023-07-24 RX ORDER — PANTOPRAZOLE SODIUM 40 MG/1
40 TABLET, DELAYED RELEASE ORAL DAILY
Qty: 30 TABLET | Refills: 1 | Status: SHIPPED | OUTPATIENT
Start: 2023-07-24 | End: 2023-11-16 | Stop reason: SDUPTHER

## 2023-07-24 RX ORDER — ESCITALOPRAM OXALATE 20 MG/1
20 TABLET ORAL DAILY
Qty: 90 TABLET | Refills: 3 | Status: SHIPPED | OUTPATIENT
Start: 2023-07-24 | End: 2024-02-05 | Stop reason: SDUPTHER

## 2023-07-24 RX ORDER — AMLODIPINE BESYLATE 10 MG/1
10 TABLET ORAL DAILY
Qty: 90 TABLET | Refills: 3 | Status: SHIPPED | OUTPATIENT
Start: 2023-07-24 | End: 2024-02-26 | Stop reason: SDUPTHER

## 2023-07-24 NOTE — PROGRESS NOTES
Subjective:     Chief Complaint: Follow-up       Patient ID: Harriet Rutherford is a 87 y.o. female     Today, patient with complaint of leg weakness.    Recently has described decreased range in legs, especially with going from sitting to standing position.  She feels weakness in his anything and require something to hold onto.  Also feels that when she goes to sit, initially there is no issue but at the end she falls onto chair.  No recent falls.  Has undergone physical therapy in the past for debility.  Interested in restarting as she feels the same symptoms are currently happening.    Chronic medical issues:    GERD worse over the last few months. Has taken omeprazole recently with improvement. No dysphagia. No weight loss.     Hypothyroidism:  currently on 200 mcg synthroid     Depression: she is taking lexapro 20 and wellbutrin 150 mg. Feels much better.      HTN:  Amlodipine 10 mg, hydrochlorothiazide 25 mg stable; occasionally experiences shortness a breath with long exertion.  no chest pain      HLD: Stable on statin     Stress incontinence: Follows with Dr. Kumar     PAD:  Leg swelling: improved; none currently; eliminated all salt from her diet     Hypothyroidism:  On Synthroid 175 mcg every morning.  Tolerating medication well.  Denies symptoms of Fatigue, Cold intolerance, Weight gain, Constipation, Dry skin, Myalgia  Due for TSH recheck.    CKD3: Most recent GFR 44.  Improved from previous check.     Memory disorder:  Donepezil 5 mg nightly.    Past Medical History:  Past Medical History:   Diagnosis Date    Breast cyst     Cataract     Chronic back pain     Hepatic steatosis 10/19/2016    Hyperlipidemia LDL goal <130     Hypertension     Hypothyroidism     OP (osteoporosis)     Smoker     Stress incontinence     Urinary tract infection        Home Medications:  Prior to Admission medications    Medication Sig Start Date End Date Taking? Authorizing Provider   alendronate (FOSAMAX) 70 MG tablet Take 1 tablet  (70 mg total) by mouth every 7 days. 11/14/22 11/14/23 Yes Arpita Guevara DO   amLODIPine (NORVASC) 10 MG tablet TAKE ONE TABLET BY MOUTH EVERY DAY 5/19/23  Yes Jael Villanueva NP   aspirin (ECOTRIN) 81 MG EC tablet Take 81 mg by mouth once daily.   Yes Historical Provider   atorvastatin (LIPITOR) 40 MG tablet Take 1 tablet (40 mg total) by mouth once daily. 10/22/22  Yes Arpita Guevara DO   buPROPion (WELLBUTRIN XL) 150 MG TB24 tablet Take 1 tablet (150 mg total) by mouth once daily. 10/23/22  Yes Arpita Guevara DO   CA CITRATE/MGOX/VIT D3/B6/MIN (CITRACAL PLUS ORAL) Take 1,200 mg by mouth once daily.   Yes Historical Provider   diclofenac sodium (SOLARAZE) 3 % gel Apply to affected area 4-5 times daily prn pain 4/28/21  Yes Arpita Guevara DO   donepeziL (ARICEPT) 5 MG tablet Take 1 tablet (5 mg total) by mouth every evening. 9/27/22 9/27/23 Yes Arpita Guevara DO   EScitalopram oxalate (LEXAPRO) 20 MG tablet TAKE ONE TABLET BY MOUTH EVERY DAY. 3/20/23  Yes Arpita Guevara DO   fenofibrate 160 MG Tab TAKE ONE TABLET BY MOUTH EVERY DAY. 3/20/23  Yes Arpita Guevara DO   hydroCHLOROthiazide (HYDRODIURIL) 25 MG tablet Take 0.5 tablets (12.5 mg total) by mouth once daily. 10/4/22  Yes Arpita Guevara DO   hydrOXYzine HCl (ATARAX) 25 MG tablet Take 1 tablet (25 mg total) by mouth 3 (three) times daily as needed for Itching. 12/5/17  Yes Arpita Guevara DO   imipramine (TOFRANIL) 25 MG tablet Take 1 tablet (25 mg total) by mouth 3 (three) times daily. 11/15/22 11/15/23 Yes Patel Kumar MD   levothyroxine (SYNTHROID, LEVOTHROID) 175 MCG tablet TAKE ONE TABLET BY MOUTH EVERY DAY. 3/20/23  Yes Arpita Guevara DO   multivitamin capsule Take 1 capsule by mouth once daily.   Yes Historical Provider   oxybutynin (DITROPAN-XL) 10 MG 24 hr tablet Take 1 tablet (10 mg total) by mouth once daily. 12/22/22 12/22/23 Yes Patel Kumar MD   temazepam (RESTORIL) 30 mg capsule Take one capsule by  "mouth every evening 23  Yes Arpita Guevara DO   valsartan (DIOVAN) 320 MG tablet TAKE ONE TABLET BY MOUTH EVERY DAY. 10/5/22  Yes Arpita Guevara DO   conjugated estrogens (PREMARIN) vaginal cream Place 1 g vaginally every other day. 11/15/22 12/15/22  Patel Kumar MD   pantoprazole (PROTONIX) 40 MG tablet Take 1 tablet (40 mg total) by mouth once daily. 23  Manju Hawthorne MD       Allergies:  Review of patient's allergies indicates:   Allergen Reactions    Lisinopril Other (See Comments)     cough    Sulfur      Other reaction(s): Urticaria    Sulfa (sulfonamide antibiotics) Rash       Social History:  Social History     Tobacco Use    Smoking status: Former     Current packs/day: 0.00     Average packs/day: 1 pack/day for 65.9 years (65.9 ttl pk-yrs)     Types: Cigarettes     Start date: 1950     Quit date: 2016     Years since quittin.0    Smokeless tobacco: Never   Substance Use Topics    Alcohol use: Yes     Alcohol/week: 1.0 standard drink of alcohol     Types: 1 Glasses of wine per week     Comment: RARELY    Drug use: No         Review of Systems   Constitutional:  Negative for diaphoresis, fatigue and fever.   HENT:  Negative for congestion, ear pain and voice change.    Eyes:  Negative for discharge, redness and itching.   Respiratory:  Negative for shortness of breath and wheezing.    Cardiovascular:  Negative for chest pain.   Gastrointestinal:  Negative for abdominal pain.   Musculoskeletal:  Negative for gait problem and joint swelling.   Skin:  Negative for color change, pallor, rash and wound.   Neurological:  Negative for weakness.           Objective:   /60 (BP Location: Right arm, Patient Position: Sitting, BP Method: Large (Manual))   Pulse 83   Ht 5' 7" (1.702 m)   Wt 95.9 kg (211 lb 6.7 oz)   SpO2 (!) 94%   BMI 33.11 kg/m²        General: AAO x3, no apparent distress  HEENT: PERRL, OP clear  CV: RRR, no m/r/g  Pulm: Lungs CTAB, no " crackles, no wheezes  Abd: s/NT/ND +BS  Extremities: no c/c/e    Labs:         Assessment/Plan     1. Decreased strength  -     Ambulatory referral/consult to Physical/Occupational Therapy; Future; Expected date: 07/31/2023    2. Quadriceps weakness  -     Ambulatory referral/consult to Physical/Occupational Therapy; Future; Expected date: 07/31/2023    3. Age-related physical debility  -     Ambulatory referral/consult to Physical/Occupational Therapy; Future; Expected date: 07/31/2023    4. Hypertension due to endocrine disorder  -     EScitalopram oxalate (LEXAPRO) 20 MG tablet; Take 1 tablet (20 mg total) by mouth once daily.  Dispense: 90 tablet; Refill: 3  -     amLODIPine (NORVASC) 10 MG tablet; Take 1 tablet (10 mg total) by mouth once daily.  Dispense: 90 tablet; Refill: 3  -     Comprehensive Metabolic Panel; Future; Expected date: 07/24/2023  -     CBC Auto Differential; Future; Expected date: 07/24/2023    5. Hyperlipidemia, unspecified hyperlipidemia type  -     EScitalopram oxalate (LEXAPRO) 20 MG tablet; Take 1 tablet (20 mg total) by mouth once daily.  Dispense: 90 tablet; Refill: 3  -     amLODIPine (NORVASC) 10 MG tablet; Take 1 tablet (10 mg total) by mouth once daily.  Dispense: 90 tablet; Refill: 3  -     Lipid Panel; Future; Expected date: 07/24/2023  Stable on medications, continue regimen    6. Hypothyroidism, unspecified type  -     EScitalopram oxalate (LEXAPRO) 20 MG tablet; Take 1 tablet (20 mg total) by mouth once daily.  Dispense: 90 tablet; Refill: 3  -     amLODIPine (NORVASC) 10 MG tablet; Take 1 tablet (10 mg total) by mouth once daily.  Dispense: 90 tablet; Refill: 3  -     TSH; Future; Expected date: 07/24/2023  Stable on medications, continue regimen    7. ALMENDAREZ (dyspnea on exertion)  -     EScitalopram oxalate (LEXAPRO) 20 MG tablet; Take 1 tablet (20 mg total) by mouth once daily.  Dispense: 90 tablet; Refill: 3  -     amLODIPine (NORVASC) 10 MG tablet; Take 1 tablet (10 mg  total) by mouth once daily.  Dispense: 90 tablet; Refill: 3    8. Anxiety and depression  -     EScitalopram oxalate (LEXAPRO) 20 MG tablet; Take 1 tablet (20 mg total) by mouth once daily.  Dispense: 90 tablet; Refill: 3  -     amLODIPine (NORVASC) 10 MG tablet; Take 1 tablet (10 mg total) by mouth once daily.  Dispense: 90 tablet; Refill: 3  Stable on medications, continue regimen    9. Gastroesophageal reflux disease, unspecified whether esophagitis present  -     pantoprazole (PROTONIX) 40 MG tablet; Take 1 tablet (40 mg total) by mouth once daily.  Dispense: 30 tablet; Refill: 1  Stable on medications, continue regimen    10. Hypertension, unspecified type  -     valsartan (DIOVAN) 320 MG tablet; Take 1 tablet (320 mg total) by mouth once daily.  Dispense: 90 tablet; Refill: 3  Stable on medications, continue regimen    11. Memory change  -     donepeziL (ARICEPT) 5 MG tablet; Take 1 tablet (5 mg total) by mouth every evening.  Dispense: 90 tablet; Refill: 3  Stable on medications, continue regimen    12. Other long term (current) drug therapy  -     Hemoglobin A1C; Future; Expected date: 07/24/2023  -     TSH; Future; Expected date: 07/24/2023  -     Lipid Panel; Future; Expected date: 07/24/2023  -     Comprehensive Metabolic Panel; Future; Expected date: 07/24/2023  -     CBC Auto Differential; Future; Expected date: 07/24/2023    13. Stage 3 chronic kidney disease, unspecified whether stage 3a or 3b CKD  Stable    14. PAD (peripheral artery disease)  Currently stable        Manju Hawthorne MD  Department of Internal Medicine - [facility]

## 2023-07-31 ENCOUNTER — LAB VISIT (OUTPATIENT)
Dept: LAB | Facility: HOSPITAL | Age: 88
End: 2023-07-31
Attending: STUDENT IN AN ORGANIZED HEALTH CARE EDUCATION/TRAINING PROGRAM
Payer: COMMERCIAL

## 2023-07-31 DIAGNOSIS — E78.5 HYPERLIPIDEMIA, UNSPECIFIED HYPERLIPIDEMIA TYPE: Chronic | ICD-10-CM

## 2023-07-31 DIAGNOSIS — I15.2 HYPERTENSION DUE TO ENDOCRINE DISORDER: Chronic | ICD-10-CM

## 2023-07-31 DIAGNOSIS — Z79.899 OTHER LONG TERM (CURRENT) DRUG THERAPY: ICD-10-CM

## 2023-07-31 DIAGNOSIS — E03.9 HYPOTHYROIDISM, UNSPECIFIED TYPE: Chronic | ICD-10-CM

## 2023-07-31 PROBLEM — N18.30 STAGE 3 CHRONIC KIDNEY DISEASE, UNSPECIFIED WHETHER STAGE 3A OR 3B CKD: Status: ACTIVE | Noted: 2023-07-31

## 2023-07-31 LAB
ALBUMIN SERPL BCP-MCNC: 3.8 G/DL (ref 3.5–5.2)
ALP SERPL-CCNC: 96 U/L (ref 55–135)
ALT SERPL W/O P-5'-P-CCNC: 23 U/L (ref 10–44)
ANION GAP SERPL CALC-SCNC: 10 MMOL/L (ref 8–16)
AST SERPL-CCNC: 22 U/L (ref 10–40)
BASOPHILS # BLD AUTO: 0.06 K/UL (ref 0–0.2)
BASOPHILS NFR BLD: 1.1 % (ref 0–1.9)
BILIRUB SERPL-MCNC: 0.3 MG/DL (ref 0.1–1)
BUN SERPL-MCNC: 22 MG/DL (ref 8–23)
CALCIUM SERPL-MCNC: 9.2 MG/DL (ref 8.7–10.5)
CHLORIDE SERPL-SCNC: 106 MMOL/L (ref 95–110)
CHOLEST SERPL-MCNC: 177 MG/DL (ref 120–199)
CHOLEST/HDLC SERPL: 3.6 {RATIO} (ref 2–5)
CO2 SERPL-SCNC: 24 MMOL/L (ref 23–29)
CREAT SERPL-MCNC: 1.2 MG/DL (ref 0.5–1.4)
DIFFERENTIAL METHOD: ABNORMAL
EOSINOPHIL # BLD AUTO: 0.3 K/UL (ref 0–0.5)
EOSINOPHIL NFR BLD: 5.3 % (ref 0–8)
ERYTHROCYTE [DISTWIDTH] IN BLOOD BY AUTOMATED COUNT: 13.1 % (ref 11.5–14.5)
EST. GFR  (NO RACE VARIABLE): 44.4 ML/MIN/1.73 M^2
ESTIMATED AVG GLUCOSE: 103 MG/DL (ref 68–131)
GLUCOSE SERPL-MCNC: 95 MG/DL (ref 70–110)
HBA1C MFR BLD: 5.2 % (ref 4–5.6)
HCT VFR BLD AUTO: 40.7 % (ref 37–48.5)
HDLC SERPL-MCNC: 49 MG/DL (ref 40–75)
HDLC SERPL: 27.7 % (ref 20–50)
HGB BLD-MCNC: 12.7 G/DL (ref 12–16)
IMM GRANULOCYTES # BLD AUTO: 0.03 K/UL (ref 0–0.04)
IMM GRANULOCYTES NFR BLD AUTO: 0.5 % (ref 0–0.5)
LDLC SERPL CALC-MCNC: 101.4 MG/DL (ref 63–159)
LYMPHOCYTES # BLD AUTO: 1.2 K/UL (ref 1–4.8)
LYMPHOCYTES NFR BLD: 22.3 % (ref 18–48)
MCH RBC QN AUTO: 30.4 PG (ref 27–31)
MCHC RBC AUTO-ENTMCNC: 31.2 G/DL (ref 32–36)
MCV RBC AUTO: 97 FL (ref 82–98)
MONOCYTES # BLD AUTO: 0.9 K/UL (ref 0.3–1)
MONOCYTES NFR BLD: 16.8 % (ref 4–15)
NEUTROPHILS # BLD AUTO: 3 K/UL (ref 1.8–7.7)
NEUTROPHILS NFR BLD: 54 % (ref 38–73)
NONHDLC SERPL-MCNC: 128 MG/DL
NRBC BLD-RTO: 0 /100 WBC
PLATELET # BLD AUTO: 354 K/UL (ref 150–450)
PMV BLD AUTO: 9.9 FL (ref 9.2–12.9)
POTASSIUM SERPL-SCNC: 4.3 MMOL/L (ref 3.5–5.1)
PROT SERPL-MCNC: 6.7 G/DL (ref 6–8.4)
RBC # BLD AUTO: 4.18 M/UL (ref 4–5.4)
SODIUM SERPL-SCNC: 140 MMOL/L (ref 136–145)
TRIGL SERPL-MCNC: 133 MG/DL (ref 30–150)
TSH SERPL DL<=0.005 MIU/L-ACNC: 0.65 UIU/ML (ref 0.4–4)
WBC # BLD AUTO: 5.48 K/UL (ref 3.9–12.7)

## 2023-07-31 PROCEDURE — 84443 ASSAY THYROID STIM HORMONE: CPT | Performed by: STUDENT IN AN ORGANIZED HEALTH CARE EDUCATION/TRAINING PROGRAM

## 2023-07-31 PROCEDURE — 80061 LIPID PANEL: CPT | Performed by: STUDENT IN AN ORGANIZED HEALTH CARE EDUCATION/TRAINING PROGRAM

## 2023-07-31 PROCEDURE — 85025 COMPLETE CBC W/AUTO DIFF WBC: CPT | Performed by: STUDENT IN AN ORGANIZED HEALTH CARE EDUCATION/TRAINING PROGRAM

## 2023-07-31 PROCEDURE — 83036 HEMOGLOBIN GLYCOSYLATED A1C: CPT | Performed by: STUDENT IN AN ORGANIZED HEALTH CARE EDUCATION/TRAINING PROGRAM

## 2023-07-31 PROCEDURE — 36415 COLL VENOUS BLD VENIPUNCTURE: CPT | Performed by: STUDENT IN AN ORGANIZED HEALTH CARE EDUCATION/TRAINING PROGRAM

## 2023-07-31 PROCEDURE — 80053 COMPREHEN METABOLIC PANEL: CPT | Performed by: STUDENT IN AN ORGANIZED HEALTH CARE EDUCATION/TRAINING PROGRAM

## 2023-08-10 ENCOUNTER — CLINICAL SUPPORT (OUTPATIENT)
Dept: REHABILITATION | Facility: HOSPITAL | Age: 88
End: 2023-08-10
Payer: COMMERCIAL

## 2023-08-10 DIAGNOSIS — G89.29 CHRONIC BILATERAL LOW BACK PAIN WITHOUT SCIATICA: ICD-10-CM

## 2023-08-10 DIAGNOSIS — R29.898 DECREASED STRENGTH OF LOWER EXTREMITY: ICD-10-CM

## 2023-08-10 DIAGNOSIS — R53.1 DECREASED STRENGTH: ICD-10-CM

## 2023-08-10 DIAGNOSIS — M54.50 CHRONIC BILATERAL LOW BACK PAIN WITHOUT SCIATICA: ICD-10-CM

## 2023-08-10 DIAGNOSIS — M62.81 QUADRICEPS WEAKNESS: ICD-10-CM

## 2023-08-10 DIAGNOSIS — R54 AGE-RELATED PHYSICAL DEBILITY: ICD-10-CM

## 2023-08-10 PROCEDURE — 97110 THERAPEUTIC EXERCISES: CPT

## 2023-08-10 PROCEDURE — 97112 NEUROMUSCULAR REEDUCATION: CPT

## 2023-08-10 PROCEDURE — 97161 PT EVAL LOW COMPLEX 20 MIN: CPT

## 2023-08-10 NOTE — PLAN OF CARE
[facility]   Physical Therapy Initial Evaluation      Name: Harriet Rutherford  Clinic Number: [id number]    Therapy Diagnosis:   Encounter Diagnoses   Name Primary?    Decreased strength     Quadriceps weakness     Age-related physical debility     Chronic bilateral low back pain without sciatica     Decreased strength of lower extremity         Physician: Manju Hawthorne MD    Physician Orders: PT Eval and Treat   Medical Diagnosis from Referral:   R53.1 (ICD-10-CM) - Decreased strength   M62.81 (ICD-10-CM) - Quadriceps weakness   R54 (ICD-10-CM) - Age-related physical debility     Evaluation Date: 8/10/2023  Authorization Period Expiration: 07/23/2024  Plan of Care Expiration: 11/10/2023  Progress Note Due: 9/10/2023  Visit # / Visits authorized: 1/ 1   FOTO: 1/ 3 8/10/2023    Precautions: Standard and Fall     Time In: 2:00  Time Out: 3:00  Total Billable Time: 60 minutes    Subjective     Date of onset: about 10-15 years ago     History of current condition - Harriet reports: increased weakness when performing daily activities such as gardening, sit to stands in chair, and going up steps. She reports getting out of bed is fine with no restrictions. She states no pain or discomfort comes with the weakness. Visited doctor with imaging that showed osteoporosis of lumbar region in the past and a referral to therapy for increase in strengthening. Past medical history of hypertension and multiple surgeries.     Falls: yes     Imaging: x-ray from 2021: FINDINGS:  Lumbar spine five views: There is a levoconvex curvature of the lumbar spine.  There is mild compression of L2.  This is unchanged from the CT dated 07/17/2019.  There is mild-moderate DJD.  No acute fracture dislocation bone destruction seen.  No pars defect seen.     Impression:     Chronic changes as above.    Prior Therapy: n/a   Social History:  lives alone  Occupation: retired but gardens    Prior Level of Function: strong with  gardening and daily activities   Current Level of Function: weakness with daily activities and hobbies     Pain:  Current 0/10, worst 0/10, best 0/10   Location: bilateral back , knee , and lower legs   Description: weakness  Aggravating Factors: Standing, Bending, Walking, Lifting, and Getting out of bed/chair  Easing Factors: relaxation    Patients goals: return to daily activities with strength      Medical History:   Past Medical History:   Diagnosis Date    Breast cyst     Cataract     Chronic back pain     Hepatic steatosis 10/19/2016    Hyperlipidemia LDL goal <130     Hypertension     Hypothyroidism     OP (osteoporosis)     Smoker     Stress incontinence     Urinary tract infection        Surgical History:   Harriet Rutherford  has a past surgical history that includes Bladder surgery; Cystoscopy; Hysterectomy; Oophorectomy; Cataract extraction; Foot surgery (Right, 11/04/2015); Colonoscopy (N/A, 05/19/2016); Breast cyst aspiration; Cystoscopy with injection of periurethral bulking agent (N/A, 01/18/2023); Eye surgery; Tonsillectomy (1943); and Cosmetic surgery (1920).    Medications:   Harriet has a current medication list which includes the following prescription(s): alendronate, amlodipine, aspirin, atorvastatin, bupropion, calcium cit/mgox/vit d3/b6/min, diclofenac sodium, donepezil, escitalopram oxalate, fenofibrate, hydrochlorothiazide, hydroxyzine hcl, imipramine, levothyroxine, multivitamin, oxybutynin, pantoprazole, temazepam, and valsartan.    Allergies:   Review of patient's allergies indicates:   Allergen Reactions    Lisinopril Other (See Comments)     cough    Sulfur      Other reaction(s): Urticaria    Sulfa (sulfonamide antibiotics) Rash        Objective      Observation: decreased strength and unsteady gait    Vitals: 133/75 BP, 92 PO2, and 90 HR    Hip Range of Motion:   Right active Right Passive Left active  Left Passive   Flexion 115* 120* 115* 120*   Abduction N/a N/a N/a N/a   Extension N/a N/a  N/a N/a   Ext. Rotation 35 40 35 40   Int. Rotation 25 30 25 30       Lower Extremity Strength  Right LE  Left LE    Quadriceps: 3+/5 Quadriceps: 3+/5   Hamstrings: 4-/5 Hamstrings: 4-/5   Iliopsoas: 3+/5 Iliopsoas: 3+/5   Hip extension:  N/a Hip extension: N/a   PGM: 3/5 PGM: 3/5   Hip ER: 3+/5 Hip ER: 3+/5   Hip IR: 4-/5 Hip IR: 4-/5   Glut Max N/a    Glut Max N/a        Functional Tests:  Sit to stand: 6 in 30 sec   TU.74 sec with unsteady gait when turning     Intake Outcome Measure for FOTO 8/10/2023 Survey    Therapist reviewed FOTO scores for Harriet Rutherford on 8/10/2023.   FOTO documents entered into EPIC - see Media section.    Intake Score: see media %         Treatment     Total Treatment time (time-based codes) separate from Evaluation: 10 minutes     Harriet received the treatments listed below:      therapeutic exercises to develop strength, endurance, ROM, flexibility, and posture for 00 minutes including:      neuromuscular re-education activities to improve: Balance, Coordination, Proprioception, and Posture for 10 minutes. The following activities were included:  Quad sets with towel 1 x 10  SLR 1 x 10 each with cues to correct form  Bridges 1 x 10  SL hip abduction 1 x 5  Pt education     therapeutic activities to improve functional performance for 00  minutes, including:      gait training to improve functional mobility and safety for 00  minutes, including:      Patient Education and Home Exercises     Education provided:   - HEP   - POC, diagnosis, prognosis    Written Home Exercises Provided: yes. Exercises were reviewed and Harriet was able to demonstrate them prior to the end of the session.  Harriet demonstrated fair  understanding of the education provided. See EMR under Patient Instructions for exercises provided during therapy sessions.    Assessment     Harriet is a 88 y.o. female referred to outpatient Physical Therapy with a medical diagnosis of   R53.1 (ICD-10-CM) - Decreased strength   M62.81  (ICD-10-CM) - Quadriceps weakness   R54 (ICD-10-CM) - Age-related physical debility   Patient presents with decreased strength in quads, hips, and gluts with no pain or discomfort. She reported having weakness start about 10-15 years ago with decreasing strength over the years. Showed unsteady gait when coming in and when completing TUG test when she was required to turn around cone.She tolerated assessment and exercises well with slight SOB when coming in and during session. She reports medication for hypertension and other medical priorities. Introduced strength training today with new exercises which she showed good understanding of how to complete.  Educated on HEP and how to progress at home which she showed good comprehension of this as well. Will progress as needed.     Patient prognosis is Good.   Patient will benefit from skilled outpatient Physical Therapy to address the deficits stated above and in the chart below, provide patient /family education, and to maximize patientt's level of independence.     Plan of care discussed with patient: Yes  Patient's spiritual, cultural and educational needs considered and patient is agreeable to the plan of care and goals as stated below:     Anticipated Barriers for therapy: scheduling and age    Medical Necessity is demonstrated by the following  History  Co-morbidities and personal factors that may impact the plan of care [] LOW: no personal factors / co-morbidities  [] MODERATE: 1-2 personal factors / co-morbidities  [x] HIGH: 3+ personal factors / co-morbidities    Moderate / High Support Documentation:   Co-morbidities affecting plan of care: see medical history    Personal Factors:   age  lifestyle     Examination  Body Structures and Functions, activity limitations and participation restrictions that may impact the plan of care [] LOW: addressing 1-2 elements  [] MODERATE: 3+ elements  [] HIGH: 4+ elements (please support below)    Moderate / High Support  Documentation: strength, endurance, motor patterns, and gait training      Clinical Presentation [] LOW: stable  [x] MODERATE: Evolving  [] HIGH: Unstable     Decision Making/ Complexity Score: moderate       Goals:  GOALS: Short Term Goals:  2-4 weeks  1.Report decreased back pain  < / =  0/10  to increase tolerance for daily activities and hobbies  2.Show increase in STS test with >/= 6 sit to stands   3.increase strength by 1/3 MMT grade in quads, gluts, and hips  to increase tolerance for ADL and work activities.  4.Pt to tolerate HEP to improve ROM and independence with ADL's  5.Improve TUG test by 1-3 sec     Long Term Goals: 10-12 weeks  1.Report decreased back pain < / = 0/10  to increase tolerance for more functional mobility   2.Patient goal: increase strength for functional activities   3.Increase strength to 4+/5 in  quads, gluts, and hips to increase tolerance for ADL and work activities.  4.Pt will report at CJ level (20-40% impaired) on LEFS  to demonstrate increase in LE function with every day tasks.    5.Show increase in STS test with >/= 7-8 sit to stands  6.Improve TUG test by 2-4 sec   Plan     Plan of care Certification: 8/10/2023 to 11/10/2023.    Outpatient Physical Therapy 1-2 times weekly for 10-12 weeks to include the following interventions: Electrical Stimulation NMES, Gait Training, Manual Therapy, Neuromuscular Re-ed, Patient Education, Self Care, Therapeutic Activities, and Therapeutic Exercise.     Dawit Oro, PT    Co-Treatment Álvaro KLEIN    I certify that I was present in the room directing the student in service delivery and guiding them using my skilled judgment. As the co-signing therapist I have reviewed the students documentation and am responsible for the treatment, assessment, and plan.         Physician's Signature: _________________________________________ Date: ________________

## 2023-08-15 ENCOUNTER — CLINICAL SUPPORT (OUTPATIENT)
Dept: REHABILITATION | Facility: HOSPITAL | Age: 88
End: 2023-08-15
Payer: COMMERCIAL

## 2023-08-15 DIAGNOSIS — M54.50 CHRONIC BILATERAL LOW BACK PAIN WITHOUT SCIATICA: Primary | ICD-10-CM

## 2023-08-15 DIAGNOSIS — R29.898 DECREASED STRENGTH OF LOWER EXTREMITY: ICD-10-CM

## 2023-08-15 DIAGNOSIS — G89.29 CHRONIC BILATERAL LOW BACK PAIN WITHOUT SCIATICA: Primary | ICD-10-CM

## 2023-08-15 PROCEDURE — 97112 NEUROMUSCULAR REEDUCATION: CPT | Mod: CQ

## 2023-08-15 NOTE — PROGRESS NOTES
"[facility]   Physical Therapy Treatment Note     Name: Harriet Rutherford  Clinic Number: [id number]    Therapy Diagnosis:   Encounter Diagnoses   Name Primary?    Chronic bilateral low back pain without sciatica Yes    Decreased strength of lower extremity      Physician: Manju Hawthorne MD    Visit Date: 8/15/2023    Physician Orders: PT Eval and Treat   Medical Diagnosis from Referral:   R53.1 (ICD-10-CM) - Decreased strength   M62.81 (ICD-10-CM) - Quadriceps weakness   R54 (ICD-10-CM) - Age-related physical debility      Evaluation Date: 8/10/2023  Authorization Period Expiration: 12/31/2023  Plan of Care Expiration: 11/10/2023  Progress Note Due: 9/10/2023  Visit # / Visits authorized: 1 / 20 + eval   FOTO: 1/3     PTA Visit #: 1 / 5     Time In: 1010  Time Out: 1100  Total Treatment Time: 50 minutes  Total Billable Time: 50 minutes (3 NMR)    Precautions: Standard and Fall    SUBJECTIVE     Patient reports: that she is having an achy pain in her Right hip from walking.  She was compliant with home exercise program.  Response to previous treatment: appropriate muscle response  Functional change: ongoing    Pain: 6-7/10, currently "ache"  Location: Right hip    Patient goals: return to daily activities with strength     OBJECTIVE     Objective Measures updated at progress report unless specified.     Treatment     Harriet received the treatments listed below:      manual therapy techniques: Joint mobilizations were applied to the: Right LE for 00 minutes, including:  Not performed    neuromuscular re-education activities to improve: Balance, Coordination, Kinesthetic, Sense, and Proprioception for 45 minutes. The following activities were included:  Patient education:   - HEP compliance   - prognosis   - avoiding aggravating factors    Quad sets with towel roll; 5 second hold, 2 x 10 reps Bilateral  Short arc quads with medium bolster; 5 second hold, 2 x 10 reps Bilateral  Long arc quads at " EOM; 5 second hold, 2 x 10 reps Bilateral  SLR; 2 x 10 reps with cueing for quad set prior to lift Bilateral  SL Hip Abduction; 2 x 10 reps Bilateral  HL Bridges with glute bias; 5 second hold, 2 x 10 reps    therapeutic activities to improve functional performance for 5 minutes, including:  Nustep for endogenous release of opioids to improve tolerance to therapeutic interventions for 5 minutes, Level 1    Patient Education and Home Exercises     Home Exercises Provided and Patient Education Provided     Education provided:   - See above    Written Home Exercises Provided: Patient instructed to cont prior HEP. Exercises were reviewed and Harriet was able to demonstrate them prior to the end of the session.  Harriet demonstrated good  understanding of the education provided. See EMR under Patient Instructions for exercises provided during therapy sessions    ASSESSMENT     Presents for first follow up after initial evaluation. She demonstrates increased difficulty with LLE side lying hip abduction > RLE. Continue per POC towards treatment goals.   Harriet Is progressing well towards her goals.   Pt prognosis is Good.     Pt will continue to benefit from skilled outpatient physical therapy to address the deficits listed in the problem list box on initial evaluation, provide pt/family education and to maximize pt's level of independence in the home and community environment.     Pt's spiritual, cultural and educational needs considered and pt agreeable to plan of care and goals.     Anticipated barriers to physical therapy: scheduling, age    Goals:   Short Term Goals:  2-4 weeks  1.Report decreased back pain  < / =  0/10  to increase tolerance for daily activities and hobbies  2.Show increase in STS test with >/= 6 sit to stands   3.increase strength by 1/3 MMT grade in quads, gluts, and hips  to increase tolerance for ADL and work activities.  4.Pt to tolerate HEP to improve ROM and independence with ADL's  5.Improve TUG test  by 1-3 sec      Long Term Goals: 10-12 weeks  1.Report decreased back pain < / = 0/10  to increase tolerance for more functional mobility   2.Patient goal: increase strength for functional activities   3.Increase strength to 4+/5 in  quads, gluts, and hips to increase tolerance for ADL and work activities.  4.Pt will report at CJ level (20-40% impaired) on LEFS  to demonstrate increase in LE function with every day tasks.    5.Show increase in STS test with >/= 7-8 sit to stands  6.Improve TUG test by 2-4 sec     PLAN     Plan of Care Certification: 8/10/2023 to 11/10/2023.     Outpatient Physical Therapy 1-2 times weekly for 10-12 weeks to include the following interventions: Electrical Stimulation NMES, Gait Training, Manual Therapy, Neuromuscular Re-ed, Patient Education, Self Care, Therapeutic Activities, and Therapeutic Exercise.     Danita Abarca, PTA

## 2023-08-17 ENCOUNTER — CLINICAL SUPPORT (OUTPATIENT)
Dept: REHABILITATION | Facility: HOSPITAL | Age: 88
End: 2023-08-17
Payer: COMMERCIAL

## 2023-08-17 DIAGNOSIS — M54.50 CHRONIC BILATERAL LOW BACK PAIN WITHOUT SCIATICA: Primary | ICD-10-CM

## 2023-08-17 DIAGNOSIS — R29.898 DECREASED STRENGTH OF LOWER EXTREMITY: ICD-10-CM

## 2023-08-17 DIAGNOSIS — G89.29 CHRONIC BILATERAL LOW BACK PAIN WITHOUT SCIATICA: Primary | ICD-10-CM

## 2023-08-17 PROCEDURE — 97112 NEUROMUSCULAR REEDUCATION: CPT | Mod: CQ

## 2023-08-17 PROCEDURE — 97530 THERAPEUTIC ACTIVITIES: CPT | Mod: CQ

## 2023-08-17 NOTE — PROGRESS NOTES
[facility]   Physical Therapy Treatment Note     Name: Harriet Rutherford  Clinic Number: [id number]    Therapy Diagnosis:   Encounter Diagnoses   Name Primary?    Chronic bilateral low back pain without sciatica Yes    Decreased strength of lower extremity      Physician: Manju Hawthorne MD    Visit Date: 8/17/2023    Physician Orders: PT Eval and Treat   Medical Diagnosis from Referral:   R53.1 (ICD-10-CM) - Decreased strength   M62.81 (ICD-10-CM) - Quadriceps weakness   R54 (ICD-10-CM) - Age-related physical debility      Evaluation Date: 8/10/2023  Authorization Period Expiration: 12/31/2023  Plan of Care Expiration: 11/10/2023  Progress Note Due: 9/10/2023  Visit # / Visits authorized: 2 / 20 + eval   FOTO: 1/3     PTA Visit #: 2 / 5     Time In: 1400  Time Out: 1500  Total Treatment Time: 60 minutes  Total Billable Time: 60 minutes (3 NMR, 1 TA)    Precautions: Standard and Fall    SUBJECTIVE     Patient reports: that she is walking better today. She had some knee discomfort following previous session. Denies pain at the moment.  She was compliant with home exercise program.  Response to previous treatment: appropriate muscle response  Functional change: ongoing    Pain: 0/10, currently   Location: Right hip    Patient goals: return to daily activities with strength     OBJECTIVE     Objective Measures updated at progress report unless specified.     Treatment     Harriet received the treatments listed below:      manual therapy techniques: Joint mobilizations were applied to the: Right LE for 00 minutes, including:  Not performed    neuromuscular re-education activities to improve: Balance, Coordination, Kinesthetic, Sense, and Proprioception for 50 minutes. The following activities were included:  Patient education:   - HEP compliance   - prognosis   - avoiding aggravating factors    Quad sets with towel roll; 5 second hold, 2 x 10 reps Bilateral  Short arc quads with medium bolster; 5  second hold, 2 x 10 reps Bilateral  Long arc quads at EOM; 5 second hold, 2 x 10 reps Bilateral  SLR; 2 x 10 reps with cueing for quad set prior to lift Bilateral  SL Hip Abduction; 2 x 10 reps Bilateral  HL Bridges with glute bias; 5 second hold, 2 x 10 reps    therapeutic activities to improve functional performance for 10 minutes, including:  Nustep for endogenous release of opioids to improve tolerance to therapeutic interventions for 10 minutes, Level 2    Patient Education and Home Exercises     Home Exercises Provided and Patient Education Provided     Education provided:   - See above    Written Home Exercises Provided: Patient instructed to cont prior HEP. Exercises were reviewed and Harriet was able to demonstrate them prior to the end of the session.  Harriet demonstrated good  understanding of the education provided. See EMR under Patient Instructions for exercises provided during therapy sessions    ASSESSMENT     She demonstrates increased difficulty with LLE side lying hip abduction > RLE. Able to complete 10 minutes on Nustep today demonstrating improvement in CV endurance and LE strength. Continue per POC towards treatment goals.   Harriet Is progressing well towards her goals.   Pt prognosis is Good.     Pt will continue to benefit from skilled outpatient physical therapy to address the deficits listed in the problem list box on initial evaluation, provide pt/family education and to maximize pt's level of independence in the home and community environment.     Pt's spiritual, cultural and educational needs considered and pt agreeable to plan of care and goals.     Anticipated barriers to physical therapy: scheduling, age    Goals:   Short Term Goals:  2-4 weeks  1.Report decreased back pain  < / =  0/10  to increase tolerance for daily activities and hobbies  2.Show increase in STS test with >/= 6 sit to stands   3.increase strength by 1/3 MMT grade in quads, gluts, and hips  to increase tolerance for ADL  and work activities.  4.Pt to tolerate HEP to improve ROM and independence with ADL's  5.Improve TUG test by 1-3 sec      Long Term Goals: 10-12 weeks  1.Report decreased back pain < / = 0/10  to increase tolerance for more functional mobility   2.Patient goal: increase strength for functional activities   3.Increase strength to 4+/5 in  quads, gluts, and hips to increase tolerance for ADL and work activities.  4.Pt will report at CJ level (20-40% impaired) on LEFS  to demonstrate increase in LE function with every day tasks.    5.Show increase in STS test with >/= 7-8 sit to stands  6.Improve TUG test by 2-4 sec     PLAN     Plan of Care Certification: 8/10/2023 to 11/10/2023.     Outpatient Physical Therapy 1-2 times weekly for 10-12 weeks to include the following interventions: Electrical Stimulation NMES, Gait Training, Manual Therapy, Neuromuscular Re-ed, Patient Education, Self Care, Therapeutic Activities, and Therapeutic Exercise.     Danita Abarca, PTA

## 2023-08-22 ENCOUNTER — DOCUMENTATION ONLY (OUTPATIENT)
Dept: REHABILITATION | Facility: HOSPITAL | Age: 88
End: 2023-08-22
Payer: COMMERCIAL

## 2023-08-22 NOTE — PROGRESS NOTES
Physical Therapy: No show/Cancellation of Visit  Date: 08/22/2023    Patient was a no show to today's PT appointment. Patient's next scheduled appointment is Thursday, 8/24/2023.     Initial Evaluation: 8/10/2023  Plan of Care Explanation: 11/10/2023  Cancel: 0  No show: 1    Therapist: Danita Abarca PTA

## 2023-08-24 ENCOUNTER — CLINICAL SUPPORT (OUTPATIENT)
Dept: REHABILITATION | Facility: HOSPITAL | Age: 88
End: 2023-08-24
Payer: COMMERCIAL

## 2023-08-24 DIAGNOSIS — M54.50 CHRONIC BILATERAL LOW BACK PAIN WITHOUT SCIATICA: Primary | ICD-10-CM

## 2023-08-24 DIAGNOSIS — R29.898 DECREASED STRENGTH OF LOWER EXTREMITY: ICD-10-CM

## 2023-08-24 DIAGNOSIS — G89.29 CHRONIC BILATERAL LOW BACK PAIN WITHOUT SCIATICA: Primary | ICD-10-CM

## 2023-08-24 PROCEDURE — 97530 THERAPEUTIC ACTIVITIES: CPT | Mod: CQ

## 2023-08-24 PROCEDURE — 97112 NEUROMUSCULAR REEDUCATION: CPT | Mod: CQ

## 2023-08-24 NOTE — PROGRESS NOTES
[facility]   Physical Therapy Treatment Note     Name: Harriet Rutherford  Clinic Number: [id number]    Therapy Diagnosis:   Encounter Diagnoses   Name Primary?    Chronic bilateral low back pain without sciatica Yes    Decreased strength of lower extremity      Physician: Manju Hawthorne MD    Visit Date: 8/24/2023    Physician Orders: PT Eval and Treat   Medical Diagnosis from Referral:   R53.1 (ICD-10-CM) - Decreased strength   M62.81 (ICD-10-CM) - Quadriceps weakness   R54 (ICD-10-CM) - Age-related physical debility      Evaluation Date: 8/10/2023  Authorization Period Expiration: 12/31/2023  Plan of Care Expiration: 11/10/2023  Progress Note Due: 9/10/2023  Visit # / Visits authorized: 3 / 20 + eval   FOTO: 1/3     PTA Visit #: 3 / 5     Time In: 10:55 am   Time Out: 11:55 am   Total Treatment Time: 60 minutes  Total Billable Time: 60 minutes (3 NMR, 1 TA)    Precautions: Standard and Fall    SUBJECTIVE     Patient reports: she is doing well today and is not having any pain. Pt mentioned that she rolled to reach her alarm clock and fell out of bed last week . She stated she hit her arm and the side of her chest but caught her feet underneath her and felt fine after .   She was compliant with home exercise program.  Response to previous treatment: appropriate muscle response  Functional change: ongoing    Pain: 0/10, currently   Location: Right hip    Patient goals: return to daily activities with strength     OBJECTIVE     Objective Measures updated at progress report unless specified.     Treatment     Harriet received the treatments listed below:      manual therapy techniques: Joint mobilizations were applied to the: Right LE for 00 minutes, including:  Not performed    neuromuscular re-education activities to improve: Balance, Coordination, Kinesthetic, Sense, and Proprioception for 50 minutes. The following activities were included:  Patient education:   - HEP compliance   -  prognosis   - avoiding aggravating factors    Quad sets with towel roll; 5 second hold, 2 x 10 reps Bilateral  Short arc quads with medium bolster; 5 second hold, 2 x 10 reps Bilateral  Long arc quads at EOM; 5 second hold, 2 x 10 reps Bilateral  SLR; 2 x 10 reps with cueing for quad set prior to lift Bilateral  SL Hip Abduction; 2 x 10 reps Bilateral  HL Bridges with glute bias; 5 second hold, 2 x 10 reps    therapeutic activities to improve functional performance for 10 minutes, including:  Nustep for endogenous release of opioids to improve tolerance to therapeutic interventions for 10 minutes, Level 2  Shuttle DL press 50# ( 2 black cords ) : 3 x 10 reps   Shuttle SL press 25# ( 1 black cord) : 3 x 10 reps   Lateral walks in // bars : x 5 laps     Patient Education and Home Exercises     Home Exercises Provided and Patient Education Provided     Education provided:   - See above    Written Home Exercises Provided: Patient instructed to cont prior HEP. Exercises were reviewed and Harriet was able to demonstrate them prior to the end of the session.  Harriet demonstrated good  understanding of the education provided. See EMR under Patient Instructions for exercises provided during therapy sessions    ASSESSMENT     Pt demonstrated a good tolerance to session today. Able to progress with shuttle leg press and lateral walking with good tolerance . She is quick to fatigue with SLR and required modifications with 2nd set to perform 2 sets of 5 due to fatigue.   Continue per POC towards treatment goals.   Harriet Is progressing well towards her goals.   Pt prognosis is Good.     Pt will continue to benefit from skilled outpatient physical therapy to address the deficits listed in the problem list box on initial evaluation, provide pt/family education and to maximize pt's level of independence in the home and community environment.   Pt's spiritual, cultural and educational needs considered and pt agreeable to plan of care and  goals.     Anticipated barriers to physical therapy: scheduling, age    Goals:   Short Term Goals:  2-4 weeks  1.Report decreased back pain  < / =  0/10  to increase tolerance for daily activities and hobbies  2.Show increase in STS test with >/= 6 sit to stands   3.increase strength by 1/3 MMT grade in quads, gluts, and hips  to increase tolerance for ADL and work activities.  4.Pt to tolerate HEP to improve ROM and independence with ADL's  5.Improve TUG test by 1-3 sec      Long Term Goals: 10-12 weeks  1.Report decreased back pain < / = 0/10  to increase tolerance for more functional mobility   2.Patient goal: increase strength for functional activities   3.Increase strength to 4+/5 in  quads, gluts, and hips to increase tolerance for ADL and work activities.  4.Pt will report at CJ level (20-40% impaired) on LEFS  to demonstrate increase in LE function with every day tasks.    5.Show increase in STS test with >/= 7-8 sit to stands  6.Improve TUG test by 2-4 sec     PLAN     Plan of Care Certification: 8/10/2023 to 11/10/2023.     Outpatient Physical Therapy 1-2 times weekly for 10-12 weeks to include the following interventions: Electrical Stimulation NMES, Gait Training, Manual Therapy, Neuromuscular Re-ed, Patient Education, Self Care, Therapeutic Activities, and Therapeutic Exercise.     Margaret German, PTA

## 2023-08-24 NOTE — PROGRESS NOTES
[facility]   Physical Therapy Treatment Note     Name: Harriet Rutherford  Clinic Number: [id number]    Therapy Diagnosis:   No diagnosis found.    Physician: Manju Hawthorne MD    Visit Date: 8/24/2023    Physician Orders: PT Eval and Treat   Medical Diagnosis from Referral:   R53.1 (ICD-10-CM) - Decreased strength   M62.81 (ICD-10-CM) - Quadriceps weakness   R54 (ICD-10-CM) - Age-related physical debility      Evaluation Date: 8/10/2023  Authorization Period Expiration: 12/31/2023  Plan of Care Expiration: 11/10/2023  Progress Note Due: 9/10/2023  Visit # / Visits authorized: 3 / 20 + eval   FOTO: 1/3     PTA Visit #: 3 / 5     Time In: ***  Time Out: ***  Total Treatment Time: *** minutes  Total Billable Time: *** minutes (***)    Precautions: Standard and Fall    SUBJECTIVE     Patient reports: ***  She was compliant with home exercise program.  Response to previous treatment: appropriate muscle response  Functional change: ongoing    Pain: 0/10, currently   Location: Right hip    Patient goals: return to daily activities with strength     OBJECTIVE     Objective Measures updated at progress report unless specified.     Treatment     Harriet received the treatments listed below:      manual therapy techniques: Joint mobilizations were applied to the: Right LE for 00 minutes, including:  Not performed    neuromuscular re-education activities to improve: Balance, Coordination, Kinesthetic, Sense, and Proprioception for *** minutes. The following activities were included:  Patient education:   - HEP compliance   - prognosis   - avoiding aggravating factors    Quad sets with towel roll; 5 second hold, 2 x 10 reps Bilateral  Short arc quads with medium bolster; 5 second hold, 2 x 10 reps Bilateral  Long arc quads at EOM; 5 second hold, 2 x 10 reps Bilateral  SLR; 2 x 10 reps with cueing for quad set prior to lift Bilateral  SL Hip Abduction; 2 x 10 reps Bilateral  HL Bridges with glute bias; 5  second hold, 2 x 10 reps    therapeutic activities to improve functional performance for *** minutes, including:  Nustep for endogenous release of opioids to improve tolerance to therapeutic interventions for 10 minutes, Level 2  Shuttle DL Press;  Lateral walks;    Patient Education and Home Exercises     Home Exercises Provided and Patient Education Provided     Education provided:   - See above    Written Home Exercises Provided: Patient instructed to cont prior HEP. Exercises were reviewed and Harriet was able to demonstrate them prior to the end of the session.  Harriet demonstrated good  understanding of the education provided. See EMR under Patient Instructions for exercises provided during therapy sessions    ASSESSMENT     ***  Harriet Is progressing well towards her goals.   Pt prognosis is Good.     Pt will continue to benefit from skilled outpatient physical therapy to address the deficits listed in the problem list box on initial evaluation, provide pt/family education and to maximize pt's level of independence in the home and community environment.     Pt's spiritual, cultural and educational needs considered and pt agreeable to plan of care and goals.     Anticipated barriers to physical therapy: scheduling, age    Goals:   Short Term Goals:  2-4 weeks  1.Report decreased back pain  < / =  0/10  to increase tolerance for daily activities and hobbies  2.Show increase in STS test with >/= 6 sit to stands   3.increase strength by 1/3 MMT grade in quads, gluts, and hips  to increase tolerance for ADL and work activities.  4.Pt to tolerate HEP to improve ROM and independence with ADL's  5.Improve TUG test by 1-3 sec      Long Term Goals: 10-12 weeks  1.Report decreased back pain < / = 0/10  to increase tolerance for more functional mobility   2.Patient goal: increase strength for functional activities   3.Increase strength to 4+/5 in  quads, gluts, and hips to increase tolerance for ADL and work activities.  4.Pt  will report at CJ level (20-40% impaired) on LEFS  to demonstrate increase in LE function with every day tasks.    5.Show increase in STS test with >/= 7-8 sit to stands  6.Improve TUG test by 2-4 sec     PLAN     Plan of Care Certification: 8/10/2023 to 11/10/2023.     Outpatient Physical Therapy 1-2 times weekly for 10-12 weeks to include the following interventions: Electrical Stimulation NMES, Gait Training, Manual Therapy, Neuromuscular Re-ed, Patient Education, Self Care, Therapeutic Activities, and Therapeutic Exercise.     Danita Abarca, PTA

## 2023-08-29 ENCOUNTER — CLINICAL SUPPORT (OUTPATIENT)
Dept: REHABILITATION | Facility: HOSPITAL | Age: 88
End: 2023-08-29
Payer: COMMERCIAL

## 2023-08-29 DIAGNOSIS — M54.50 CHRONIC BILATERAL LOW BACK PAIN WITHOUT SCIATICA: Primary | ICD-10-CM

## 2023-08-29 DIAGNOSIS — R29.898 DECREASED STRENGTH OF LOWER EXTREMITY: ICD-10-CM

## 2023-08-29 DIAGNOSIS — G89.29 CHRONIC BILATERAL LOW BACK PAIN WITHOUT SCIATICA: Primary | ICD-10-CM

## 2023-08-29 PROCEDURE — 97112 NEUROMUSCULAR REEDUCATION: CPT

## 2023-08-29 PROCEDURE — 97530 THERAPEUTIC ACTIVITIES: CPT

## 2023-08-29 NOTE — PROGRESS NOTES
[facility]   Physical Therapy Treatment Note     Name: Harriet Rutherford  Clinic Number: [id number]    Therapy Diagnosis:   Encounter Diagnoses   Name Primary?    Chronic bilateral low back pain without sciatica Yes    Decreased strength of lower extremity        Physician: Manju Hawthorne MD    Visit Date: 8/29/2023    Physician Orders: PT Eval and Treat   Medical Diagnosis from Referral:   R53.1 (ICD-10-CM) - Decreased strength   M62.81 (ICD-10-CM) - Quadriceps weakness   R54 (ICD-10-CM) - Age-related physical debility      Evaluation Date: 8/10/2023  Authorization Period Expiration: 12/31/2023  Plan of Care Expiration: 11/10/2023  Progress Note Due: 9/10/2023  Visit # / Visits authorized: 4 / 20 + eval   FOTO: 1/3     PTA Visit #: 0 / 5     Time In: 14:00  Time Out: 14:55   Total Treatment Time: 55 minutes  Total Billable Time: 55 minutes     Precautions: Standard and Fall    SUBJECTIVE     Patient reports: she did not sleep well last night and feeling a little fatigued.   She was compliant with home exercise program.  Response to previous treatment: appropriate muscle response  Functional change: ongoing    Pain: 0/10, currently   Location: Right hip    Patient goals: return to daily activities with strength     OBJECTIVE     Objective Measures updated at progress report unless specified.     97% o2, 77 HR  119/77    Treatment     Harriet received the treatments listed below:      manual therapy techniques: Joint mobilizations were applied to the: Right LE for 00 minutes, including:  Not performed    neuromuscular re-education activities to improve: Balance, Coordination, Kinesthetic, Sense, and Proprioception for 30 minutes. The following activities were included:  Patient education:   - HEP compliance   - prognosis   - avoiding aggravating factors    Short arc quads with medium bolster 2# each; 5 second hold, 2 x 10 reps Bilateral  Long arc quads at EOM 2#; 5 second hold, 2 x 10 reps  "Bilateral  SLR; 2 x 10 reps with cueing for quad set prior to lift Bilateral  Supine clams green TB 3x10x5"  Sidelying clams red TB 3x10 each    Not Performed  SL Hip Abduction; 2 x 10 reps Bilateral  HL Bridges with glute bias; 5 second hold, 2 x 10 reps    therapeutic activities to improve functional performance for 25 minutes, including:  Assessment  Vitals   Nustep for endogenous release of opioids to improve tolerance to therapeutic interventions for 10 minutes, Level 2  Shuttle DL press 50# ( 2 black cords ) : 3 x 10 reps   Shuttle SL press 25# ( 1 black cord) : 3 x 10 reps    NP   Lateral walks in // bars : x 5 laps     Patient Education and Home Exercises     Home Exercises Provided and Patient Education Provided     Education provided:   - See above    Written Home Exercises Provided: Patient instructed to cont prior HEP. Exercises were reviewed and Harriet was able to demonstrate them prior to the end of the session.  Harriet demonstrated good  understanding of the education provided. See EMR under Patient Instructions for exercises provided during therapy sessions    ASSESSMENT     Harriet did well today and progressed with exercises with weight and increased load. She did well and we discussed her sleeping and feeling fatigued. Vitals were normal today. Educated on continuing to progress. Will have to be out for a week due to helping with her daughter.     Harriet Is progressing well towards her goals.   Pt prognosis is Good.     Pt will continue to benefit from skilled outpatient physical therapy to address the deficits listed in the problem list box on initial evaluation, provide pt/family education and to maximize pt's level of independence in the home and community environment.   Pt's spiritual, cultural and educational needs considered and pt agreeable to plan of care and goals.     Anticipated barriers to physical therapy: scheduling, age    Goals:   Short Term Goals:  2-4 weeks  1.Report decreased back pain  " < / =  0/10  to increase tolerance for daily activities and hobbies  2.Show increase in STS test with >/= 6 sit to stands   3.increase strength by 1/3 MMT grade in quads, gluts, and hips  to increase tolerance for ADL and work activities.  4.Pt to tolerate HEP to improve ROM and independence with ADL's  5.Improve TUG test by 1-3 sec      Long Term Goals: 10-12 weeks  1.Report decreased back pain < / = 0/10  to increase tolerance for more functional mobility   2.Patient goal: increase strength for functional activities   3.Increase strength to 4+/5 in  quads, gluts, and hips to increase tolerance for ADL and work activities.  4.Pt will report at CJ level (20-40% impaired) on LEFS  to demonstrate increase in LE function with every day tasks.    5.Show increase in STS test with >/= 7-8 sit to stands  6.Improve TUG test by 2-4 sec     PLAN     Plan of Care Certification: 8/10/2023 to 11/10/2023.     Outpatient Physical Therapy 1-2 times weekly for 10-12 weeks to include the following interventions: Electrical Stimulation NMES, Gait Training, Manual Therapy, Neuromuscular Re-ed, Patient Education, Self Care, Therapeutic Activities, and Therapeutic Exercise.     Dawit Oro, PT

## 2023-08-30 ENCOUNTER — TELEPHONE (OUTPATIENT)
Dept: PRIMARY CARE CLINIC | Facility: CLINIC | Age: 88
End: 2023-08-30
Payer: COMMERCIAL

## 2023-08-30 DIAGNOSIS — F41.1 GENERALIZED ANXIETY DISORDER: Primary | ICD-10-CM

## 2023-08-30 RX ORDER — TEMAZEPAM 30 MG/1
30 CAPSULE ORAL NIGHTLY PRN
Qty: 30 CAPSULE | Refills: 0 | Status: SHIPPED | OUTPATIENT
Start: 2023-08-30 | End: 2023-09-29

## 2023-08-30 NOTE — TELEPHONE ENCOUNTER
----- Message from Manju Hawthorne MD sent at 2023  1:11 PM CDT -----  Regarding: Referral  Please let patient know I sent one-month supply of the temazepam to her pharmacy.  I do not prescribe daily controlled substances.  Due to her age, that medication should not be taken on a daily basis due to risks.  I have placed a referral for Psychiatry to discuss further and to see if there are other medications she can use instead.    Psychiatry Department [phone].

## 2023-09-08 ENCOUNTER — OFFICE VISIT (OUTPATIENT)
Dept: OPTOMETRY | Facility: CLINIC | Age: 88
End: 2023-09-08
Payer: COMMERCIAL

## 2023-09-08 DIAGNOSIS — H52.4 PRESBYOPIA: ICD-10-CM

## 2023-09-08 DIAGNOSIS — Z13.5 GLAUCOMA SCREENING: ICD-10-CM

## 2023-09-08 DIAGNOSIS — H04.123 DRY EYES, BILATERAL: Primary | ICD-10-CM

## 2023-09-08 PROCEDURE — 1101F PR PT FALLS ASSESS DOC 0-1 FALLS W/OUT INJ PAST YR: ICD-10-PCS | Mod: CPTII,S$GLB,, | Performed by: OPTOMETRIST

## 2023-09-08 PROCEDURE — 3288F FALL RISK ASSESSMENT DOCD: CPT | Mod: CPTII,S$GLB,, | Performed by: OPTOMETRIST

## 2023-09-08 PROCEDURE — 1159F PR MEDICATION LIST DOCUMENTED IN MEDICAL RECORD: ICD-10-PCS | Mod: CPTII,S$GLB,, | Performed by: OPTOMETRIST

## 2023-09-08 PROCEDURE — 1126F PR PAIN SEVERITY QUANTIFIED, NO PAIN PRESENT: ICD-10-PCS | Mod: CPTII,S$GLB,, | Performed by: OPTOMETRIST

## 2023-09-08 PROCEDURE — 99999 PR PBB SHADOW E&M-EST. PATIENT-LVL III: ICD-10-PCS | Mod: PBBFAC,,, | Performed by: OPTOMETRIST

## 2023-09-08 PROCEDURE — 1160F PR REVIEW ALL MEDS BY PRESCRIBER/CLIN PHARMACIST DOCUMENTED: ICD-10-PCS | Mod: CPTII,S$GLB,, | Performed by: OPTOMETRIST

## 2023-09-08 PROCEDURE — 92015 PR REFRACTION: ICD-10-PCS | Mod: S$GLB,,, | Performed by: OPTOMETRIST

## 2023-09-08 PROCEDURE — 1157F ADVNC CARE PLAN IN RCRD: CPT | Mod: CPTII,S$GLB,, | Performed by: OPTOMETRIST

## 2023-09-08 PROCEDURE — 1101F PT FALLS ASSESS-DOCD LE1/YR: CPT | Mod: CPTII,S$GLB,, | Performed by: OPTOMETRIST

## 2023-09-08 PROCEDURE — 3288F PR FALLS RISK ASSESSMENT DOCUMENTED: ICD-10-PCS | Mod: CPTII,S$GLB,, | Performed by: OPTOMETRIST

## 2023-09-08 PROCEDURE — 92015 DETERMINE REFRACTIVE STATE: CPT | Mod: S$GLB,,, | Performed by: OPTOMETRIST

## 2023-09-08 PROCEDURE — 92014 COMPRE OPH EXAM EST PT 1/>: CPT | Mod: S$GLB,,, | Performed by: OPTOMETRIST

## 2023-09-08 PROCEDURE — 1159F MED LIST DOCD IN RCRD: CPT | Mod: CPTII,S$GLB,, | Performed by: OPTOMETRIST

## 2023-09-08 PROCEDURE — 92014 PR EYE EXAM, EST PATIENT,COMPREHESV: ICD-10-PCS | Mod: S$GLB,,, | Performed by: OPTOMETRIST

## 2023-09-08 PROCEDURE — 1126F AMNT PAIN NOTED NONE PRSNT: CPT | Mod: CPTII,S$GLB,, | Performed by: OPTOMETRIST

## 2023-09-08 PROCEDURE — 1160F RVW MEDS BY RX/DR IN RCRD: CPT | Mod: CPTII,S$GLB,, | Performed by: OPTOMETRIST

## 2023-09-08 PROCEDURE — 1157F PR ADVANCE CARE PLAN OR EQUIV PRESENT IN MEDICAL RECORD: ICD-10-PCS | Mod: CPTII,S$GLB,, | Performed by: OPTOMETRIST

## 2023-09-08 PROCEDURE — 99999 PR PBB SHADOW E&M-EST. PATIENT-LVL III: CPT | Mod: PBBFAC,,, | Performed by: OPTOMETRIST

## 2023-09-08 NOTE — PROGRESS NOTES
HPI    DSL- 8/25/2022 Dr. Stratton    89 y/o female present to clinic for routine eye exam. Pt present to clinic   for concerns of eye allergies. No visional changes since last eye exam.   Denies floaters and flashes of lights.     Eyemeds  Allergy drops  Last edited by Quintin Ferreira on 9/8/2023  2:25 PM.            Assessment /Plan     For exam results, see Encounter Report.    Dry eyes, bilateral    Glaucoma screening    Presbyopia      Sp pciol/YAG OU--pt happy w otc readers  JOHN--advised SYS COMPLETE ATs QID+    PLAN:    Rtc 1 yr

## 2023-09-12 ENCOUNTER — CLINICAL SUPPORT (OUTPATIENT)
Dept: REHABILITATION | Facility: HOSPITAL | Age: 88
End: 2023-09-12
Payer: COMMERCIAL

## 2023-09-12 DIAGNOSIS — M54.50 CHRONIC BILATERAL LOW BACK PAIN WITHOUT SCIATICA: Primary | ICD-10-CM

## 2023-09-12 DIAGNOSIS — G89.29 CHRONIC BILATERAL LOW BACK PAIN WITHOUT SCIATICA: Primary | ICD-10-CM

## 2023-09-12 DIAGNOSIS — R29.898 DECREASED STRENGTH OF LOWER EXTREMITY: ICD-10-CM

## 2023-09-12 PROCEDURE — 97112 NEUROMUSCULAR REEDUCATION: CPT

## 2023-09-12 PROCEDURE — 97530 THERAPEUTIC ACTIVITIES: CPT

## 2023-09-14 ENCOUNTER — CLINICAL SUPPORT (OUTPATIENT)
Dept: REHABILITATION | Facility: HOSPITAL | Age: 88
End: 2023-09-14
Payer: COMMERCIAL

## 2023-09-14 DIAGNOSIS — M54.50 CHRONIC BILATERAL LOW BACK PAIN WITHOUT SCIATICA: Primary | ICD-10-CM

## 2023-09-14 DIAGNOSIS — R29.898 DECREASED STRENGTH OF LOWER EXTREMITY: ICD-10-CM

## 2023-09-14 DIAGNOSIS — G89.29 CHRONIC BILATERAL LOW BACK PAIN WITHOUT SCIATICA: Primary | ICD-10-CM

## 2023-09-14 PROCEDURE — 97530 THERAPEUTIC ACTIVITIES: CPT

## 2023-09-14 PROCEDURE — 97112 NEUROMUSCULAR REEDUCATION: CPT

## 2023-09-19 NOTE — PROGRESS NOTES
[facility]   Physical Therapy Treatment Note     Name: Harriet Rutherford  Clinic Number: [id number]    Therapy Diagnosis:   Encounter Diagnoses   Name Primary?    Chronic bilateral low back pain without sciatica Yes    Decreased strength of lower extremity        Physician: Manju Hawthorne MD    Visit Date: 9/14/2023    Physician Orders: PT Eval and Treat   Medical Diagnosis from Referral:   R53.1 (ICD-10-CM) - Decreased strength   M62.81 (ICD-10-CM) - Quadriceps weakness   R54 (ICD-10-CM) - Age-related physical debility      Evaluation Date: 8/10/2023  Authorization Period Expiration: 12/31/2023  Plan of Care Expiration: 11/10/2023  Progress Note Due: 9/10/2023  Visit # / Visits authorized: 6 / 20 + eval   FOTO: 1/3     PTA Visit #: 0 / 5     Time In: 11:00  Time Out: 11:45  Total Treatment Time: 45 minutes  Total Billable Time: 25 minutes     Precautions: Standard and Fall    SUBJECTIVE     Patient reports: a little tired today but doing okay.   She was compliant with home exercise program.  Response to previous treatment: appropriate muscle response  Functional change: ongoing    Pain: 0/10, currently   Location: Right hip    Patient goals: return to daily activities with strength     OBJECTIVE     Objective Measures updated at progress report unless specified.     97% o2, 77 HR  119/77    Treatment     Harriet received the treatments listed below:      manual therapy techniques: Joint mobilizations were applied to the: Right LE for 00 minutes, including:  Not performed    neuromuscular re-education activities to improve: Balance, Coordination, Kinesthetic, Sense, and Proprioception for 30 minutes. The following activities were included:  Patient education:   - HEP compliance   - prognosis   - avoiding aggravating factors    Long arc quads at EOM 3#; 5 second hold, 2 x 10 reps Bilateral  SLR 2# ; 3 x 10 reps with cueing for quad set prior to lift Bilateral  Leg ext machine 15# 3x10  Leg  "curl machine 15# 3x10    Not Performed   Supine clams green TB 3x10x5"  Sidelying clams red TB 3x10 each    Not Performed  SL Hip Abduction; 2 x 10 reps Bilateral  HL Bridges with glute bias; 5 second hold, 2 x 10 reps    therapeutic activities to improve functional performance for 16 minutes, including:  Assessment  Vitals   Nustep for endogenous release of opioids to improve tolerance to therapeutic interventions for 10 minutes, Level 2  Sit to stands 3x8-10    Np   Shuttle SL press 25# ( 1 black cord) : 3 x 10 reps  Lateral walks in // bars : x 5 laps     Patient Education and Home Exercises     Home Exercises Provided and Patient Education Provided     Education provided:   - See above    Written Home Exercises Provided: Patient instructed to cont prior HEP. Exercises were reviewed and Harriet was able to demonstrate them prior to the end of the session.  Harriet demonstrated good  understanding of the education provided. See EMR under Patient Instructions for exercises provided during therapy sessions    ASSESSMENT     Went through machine exercises to perform at the gym and to work on strengthening. A little fatigued today so did not complete the full session. Did well with exercises but not motivated to do exercises at home. Printed new HEP and educated on importance of compliance.     Harriet Is progressing well towards her goals.   Pt prognosis is Good.     Pt will continue to benefit from skilled outpatient physical therapy to address the deficits listed in the problem list box on initial evaluation, provide pt/family education and to maximize pt's level of independence in the home and community environment.   Pt's spiritual, cultural and educational needs considered and pt agreeable to plan of care and goals.     Anticipated barriers to physical therapy: scheduling, age    Goals:   Short Term Goals:  2-4 weeks  1.Report decreased back pain  < / =  0/10  to increase tolerance for daily activities and " hobbies  2.Show increase in STS test with >/= 6 sit to stands   3.increase strength by 1/3 MMT grade in quads, gluts, and hips  to increase tolerance for ADL and work activities.  4.Pt to tolerate HEP to improve ROM and independence with ADL's  5.Improve TUG test by 1-3 sec      Long Term Goals: 10-12 weeks  1.Report decreased back pain < / = 0/10  to increase tolerance for more functional mobility   2.Patient goal: increase strength for functional activities   3.Increase strength to 4+/5 in  quads, gluts, and hips to increase tolerance for ADL and work activities.  4.Pt will report at CJ level (20-40% impaired) on LEFS  to demonstrate increase in LE function with every day tasks.    5.Show increase in STS test with >/= 7-8 sit to stands  6.Improve TUG test by 2-4 sec     PLAN     Plan of Care Certification: 8/10/2023 to 11/10/2023.     Outpatient Physical Therapy 1-2 times weekly for 10-12 weeks to include the following interventions: Electrical Stimulation NMES, Gait Training, Manual Therapy, Neuromuscular Re-ed, Patient Education, Self Care, Therapeutic Activities, and Therapeutic Exercise.     Dawit Oro, PT

## 2023-09-19 NOTE — PROGRESS NOTES
[facility]   Physical Therapy Treatment Note     Name: Harriet Rutherford  Clinic Number: [id number]    Therapy Diagnosis:   Encounter Diagnoses   Name Primary?    Chronic bilateral low back pain without sciatica Yes    Decreased strength of lower extremity        Physician: Manju Hawthorne MD    Visit Date: 9/12/2023    Physician Orders: PT Eval and Treat   Medical Diagnosis from Referral:   R53.1 (ICD-10-CM) - Decreased strength   M62.81 (ICD-10-CM) - Quadriceps weakness   R54 (ICD-10-CM) - Age-related physical debility      Evaluation Date: 8/10/2023  Authorization Period Expiration: 12/31/2023  Plan of Care Expiration: 11/10/2023  Progress Note Due: 9/10/2023  Visit # / Visits authorized: 4 / 20 + eval   FOTO: 1/3     PTA Visit #: 0 / 5     Time In: 13:00  Time Out: 13:55  Total Treatment Time: 55 minutes  Total Billable Time: 25 minutes     Precautions: Standard and Fall    SUBJECTIVE     Patient reports: doing okay, no pain still. Back from trip assisting daughter.   She was compliant with home exercise program.  Response to previous treatment: appropriate muscle response  Functional change: ongoing    Pain: 0/10, currently   Location: Right hip    Patient goals: return to daily activities with strength     OBJECTIVE     Objective Measures updated at progress report unless specified.     97% o2, 77 HR  119/77    Treatment     Harriet received the treatments listed below:      manual therapy techniques: Joint mobilizations were applied to the: Right LE for 00 minutes, including:  Not performed    neuromuscular re-education activities to improve: Balance, Coordination, Kinesthetic, Sense, and Proprioception for 30 minutes. The following activities were included:  Patient education:   - HEP compliance   - prognosis   - avoiding aggravating factors    Short arc quads with medium bolster 3# each; 5 second hold, 2 x 10 reps Bilateral  Long arc quads at EOM 3#; 5 second hold, 2 x 10 reps  "Bilateral  SLR 2# ; 3 x 10 reps with cueing for quad set prior to lift Bilateral  Supine clams green TB 3x10x5"  Sidelying clams red TB 3x10 each    Not Performed  SL Hip Abduction; 2 x 10 reps Bilateral  HL Bridges with glute bias; 5 second hold, 2 x 10 reps    therapeutic activities to improve functional performance for 25 minutes, including:  Assessment  Vitals   Nustep for endogenous release of opioids to improve tolerance to therapeutic interventions for 10 minutes, Level 2  Sit to stands 3x8-10  Shuttle SL press 25# ( 1 black cord) : 3 x 10 reps    NP   Lateral walks in // bars : x 5 laps     Patient Education and Home Exercises     Home Exercises Provided and Patient Education Provided     Education provided:   - See above    Written Home Exercises Provided: Patient instructed to cont prior HEP. Exercises were reviewed and Harriet was able to demonstrate them prior to the end of the session.  Harriet demonstrated good  understanding of the education provided. See EMR under Patient Instructions for exercises provided during therapy sessions    ASSESSMENT     Harriet did well and continued with strengthening exercises. Incorporated sit to stands with some fatigue but did well. Continue to work on strengthening and discussed return to gym activities.     Harriet Is progressing well towards her goals.   Pt prognosis is Good.     Pt will continue to benefit from skilled outpatient physical therapy to address the deficits listed in the problem list box on initial evaluation, provide pt/family education and to maximize pt's level of independence in the home and community environment.   Pt's spiritual, cultural and educational needs considered and pt agreeable to plan of care and goals.     Anticipated barriers to physical therapy: scheduling, age    Goals:   Short Term Goals:  2-4 weeks  1.Report decreased back pain  < / =  0/10  to increase tolerance for daily activities and hobbies  2.Show increase in STS test with >/= 6 sit " to stands   3.increase strength by 1/3 MMT grade in quads, gluts, and hips  to increase tolerance for ADL and work activities.  4.Pt to tolerate HEP to improve ROM and independence with ADL's  5.Improve TUG test by 1-3 sec      Long Term Goals: 10-12 weeks  1.Report decreased back pain < / = 0/10  to increase tolerance for more functional mobility   2.Patient goal: increase strength for functional activities   3.Increase strength to 4+/5 in  quads, gluts, and hips to increase tolerance for ADL and work activities.  4.Pt will report at CJ level (20-40% impaired) on LEFS  to demonstrate increase in LE function with every day tasks.    5.Show increase in STS test with >/= 7-8 sit to stands  6.Improve TUG test by 2-4 sec     PLAN     Plan of Care Certification: 8/10/2023 to 11/10/2023.     Outpatient Physical Therapy 1-2 times weekly for 10-12 weeks to include the following interventions: Electrical Stimulation NMES, Gait Training, Manual Therapy, Neuromuscular Re-ed, Patient Education, Self Care, Therapeutic Activities, and Therapeutic Exercise.     Dawit Oro, PT

## 2023-10-17 ENCOUNTER — TELEPHONE (OUTPATIENT)
Dept: PRIMARY CARE CLINIC | Facility: CLINIC | Age: 88
End: 2023-10-17
Payer: COMMERCIAL

## 2023-10-17 NOTE — TELEPHONE ENCOUNTER
----- Message from Memo Martínez sent at 10/17/2023 10:41 AM CDT -----  Contact: [facility]  Requesting an RX refill or new RX.  Is this a refill or new RX: refill   RX name and strength (copy/paste from chart):   temazepam 30mg  Is this a 30 day or 90 day RX: 90  Pharmacy name and phone # (copy/paste from chart):    [id number] - [facility] [id number] [facility] [address]  Phone: [phone] Fax: [phone]

## 2023-10-23 RX ORDER — TEMAZEPAM 30 MG/1
30 CAPSULE ORAL NIGHTLY PRN
Qty: 90 CAPSULE | Refills: 0 | Status: SHIPPED | OUTPATIENT
Start: 2023-10-23 | End: 2023-10-25 | Stop reason: SDUPTHER

## 2023-10-23 NOTE — TELEPHONE ENCOUNTER
Spoke to pharmacy, stated pt is requesting Temazepam refill. [facility] in [place] still open for another month, pended rx if okay

## 2023-10-23 NOTE — TELEPHONE ENCOUNTER
----- Message from Tom Martínez sent at 10/23/2023 11:06 AM CDT -----  Contact: shannon (pharm) [phone]  [facility] from [facility] requesting a call for status of fax from 10/17/2023 for temazepam 40mg.    Please call and advise

## 2023-10-25 DIAGNOSIS — G47.00 INSOMNIA, UNSPECIFIED TYPE: Primary | ICD-10-CM

## 2023-10-25 RX ORDER — TEMAZEPAM 30 MG/1
30 CAPSULE ORAL NIGHTLY PRN
Qty: 90 CAPSULE | Refills: 0 | Status: SHIPPED | OUTPATIENT
Start: 2023-10-25 | End: 2024-01-25 | Stop reason: SDUPTHER

## 2023-11-03 DIAGNOSIS — E78.5 HYPERLIPIDEMIA, UNSPECIFIED HYPERLIPIDEMIA TYPE: Chronic | ICD-10-CM

## 2023-11-03 DIAGNOSIS — I15.2 HYPERTENSION DUE TO ENDOCRINE DISORDER: Chronic | ICD-10-CM

## 2023-11-03 DIAGNOSIS — E03.9 HYPOTHYROIDISM, UNSPECIFIED TYPE: Chronic | ICD-10-CM

## 2023-11-03 DIAGNOSIS — F41.9 ANXIETY AND DEPRESSION: ICD-10-CM

## 2023-11-03 DIAGNOSIS — R06.09 DOE (DYSPNEA ON EXERTION): ICD-10-CM

## 2023-11-03 DIAGNOSIS — F32.A ANXIETY AND DEPRESSION: ICD-10-CM

## 2023-11-03 RX ORDER — ATORVASTATIN CALCIUM 40 MG/1
40 TABLET, FILM COATED ORAL
Qty: 90 TABLET | Refills: 3 | Status: SHIPPED | OUTPATIENT
Start: 2023-11-03 | End: 2023-11-03 | Stop reason: SDUPTHER

## 2023-11-03 RX ORDER — ATORVASTATIN CALCIUM 40 MG/1
40 TABLET, FILM COATED ORAL DAILY
Qty: 90 TABLET | Refills: 3 | Status: SHIPPED | OUTPATIENT
Start: 2023-11-03

## 2023-11-03 NOTE — TELEPHONE ENCOUNTER
----- Message from Gail Chowdary sent at 11/3/2023  4:44 PM CDT -----  Contact: [phone]  Requesting an RX refill or new RX.  Is this a refill or new RX:   RX name and strength (t): atorvastatin (LIPITOR) 40 MG tablet   Is this a 30 day or 90 day RX: 90  Pharmacy name and phone #  [id number] - [place], [place] - [id number] [facility] [address]  Phone: [phone] Fax: [phone]       Pharmacy state if the Rx can be call in.  Does not got the E-prescribe.      Health Maintenance Summary     Topic Due On Due Status Completed On    PAP SMEAR - CERVICAL CANCER SCREENING Feb 12, 2019 Not Due Feb 12, 2016    Immunization - TDAP Pregnancy  Hidden     IMMUNIZATION - DTaP/Tdap/Td Oct 3, 2024 Not Due Oct 3, 2014    Immunization-Influenza  Completed Feb 16, 2017          Patient is up to date, no discussion needed .

## 2023-11-16 DIAGNOSIS — K21.9 GASTROESOPHAGEAL REFLUX DISEASE, UNSPECIFIED WHETHER ESOPHAGITIS PRESENT: ICD-10-CM

## 2023-11-16 RX ORDER — LEVOTHYROXINE SODIUM 175 UG/1
175 TABLET ORAL DAILY
Qty: 90 TABLET | Refills: 3 | Status: SHIPPED | OUTPATIENT
Start: 2023-11-16 | End: 2023-11-20 | Stop reason: SDUPTHER

## 2023-11-16 RX ORDER — PANTOPRAZOLE SODIUM 40 MG/1
40 TABLET, DELAYED RELEASE ORAL DAILY
Qty: 30 TABLET | Refills: 2 | Status: SHIPPED | OUTPATIENT
Start: 2023-11-16 | End: 2023-11-20 | Stop reason: SDUPTHER

## 2023-11-20 ENCOUNTER — PATIENT MESSAGE (OUTPATIENT)
Dept: PRIMARY CARE CLINIC | Facility: CLINIC | Age: 88
End: 2023-11-20
Payer: COMMERCIAL

## 2023-11-20 DIAGNOSIS — K21.9 GASTROESOPHAGEAL REFLUX DISEASE, UNSPECIFIED WHETHER ESOPHAGITIS PRESENT: ICD-10-CM

## 2023-11-20 RX ORDER — HYDROXYZINE HYDROCHLORIDE 25 MG/1
25 TABLET, FILM COATED ORAL 3 TIMES DAILY PRN
Qty: 60 TABLET | Refills: 3 | Status: SHIPPED | OUTPATIENT
Start: 2023-11-20

## 2023-11-20 RX ORDER — PANTOPRAZOLE SODIUM 40 MG/1
40 TABLET, DELAYED RELEASE ORAL DAILY
Qty: 30 TABLET | Refills: 2 | Status: SHIPPED | OUTPATIENT
Start: 2023-11-20 | End: 2023-12-17

## 2023-11-20 RX ORDER — LEVOTHYROXINE SODIUM 175 UG/1
175 TABLET ORAL DAILY
Qty: 90 TABLET | Refills: 3 | Status: SHIPPED | OUTPATIENT
Start: 2023-11-20

## 2023-12-17 DIAGNOSIS — K21.9 GASTROESOPHAGEAL REFLUX DISEASE, UNSPECIFIED WHETHER ESOPHAGITIS PRESENT: ICD-10-CM

## 2023-12-17 RX ORDER — PANTOPRAZOLE SODIUM 40 MG/1
40 TABLET, DELAYED RELEASE ORAL
Qty: 90 TABLET | Refills: 0 | Status: SHIPPED | OUTPATIENT
Start: 2023-12-17

## 2023-12-18 NOTE — TELEPHONE ENCOUNTER
Refill Decision Note   Harriet Rutherford  is requesting a refill authorization.  Brief Assessment and Rationale for Refill:  Approve     Medication Therapy Plan:       Medication Reconciliation Completed: No   Comments:     No Care Gaps recommended.     Note composed:6:56 PM 12/17/2023

## 2023-12-21 ENCOUNTER — PATIENT MESSAGE (OUTPATIENT)
Dept: PRIMARY CARE CLINIC | Facility: CLINIC | Age: 88
End: 2023-12-21
Payer: COMMERCIAL

## 2023-12-21 DIAGNOSIS — F32.A ANXIETY AND DEPRESSION: ICD-10-CM

## 2023-12-21 DIAGNOSIS — N39.3 STRESS INCONTINENCE: ICD-10-CM

## 2023-12-21 DIAGNOSIS — E03.9 HYPOTHYROIDISM, UNSPECIFIED TYPE: Chronic | ICD-10-CM

## 2023-12-21 DIAGNOSIS — E78.5 HYPERLIPIDEMIA, UNSPECIFIED HYPERLIPIDEMIA TYPE: Chronic | ICD-10-CM

## 2023-12-21 DIAGNOSIS — R06.09 DOE (DYSPNEA ON EXERTION): ICD-10-CM

## 2023-12-21 DIAGNOSIS — F41.9 ANXIETY AND DEPRESSION: ICD-10-CM

## 2023-12-21 DIAGNOSIS — I15.2 HYPERTENSION DUE TO ENDOCRINE DISORDER: Chronic | ICD-10-CM

## 2023-12-22 RX ORDER — FENOFIBRATE 160 MG/1
160 TABLET ORAL DAILY
Qty: 90 TABLET | Refills: 1 | Status: SHIPPED | OUTPATIENT
Start: 2023-12-22

## 2023-12-22 RX ORDER — IMIPRAMINE HYDROCHLORIDE 25 MG/1
25 TABLET, FILM COATED ORAL 3 TIMES DAILY
Qty: 270 TABLET | Refills: 0 | Status: SHIPPED | OUTPATIENT
Start: 2023-12-22 | End: 2024-12-21

## 2024-01-25 DIAGNOSIS — G47.00 INSOMNIA, UNSPECIFIED TYPE: ICD-10-CM

## 2024-01-26 RX ORDER — TEMAZEPAM 30 MG/1
30 CAPSULE ORAL NIGHTLY PRN
Qty: 90 CAPSULE | Refills: 0 | Status: SHIPPED | OUTPATIENT
Start: 2024-01-26 | End: 2024-04-25

## 2024-02-01 ENCOUNTER — OFFICE VISIT (OUTPATIENT)
Dept: PODIATRY | Facility: CLINIC | Age: 89
End: 2024-02-01
Payer: COMMERCIAL

## 2024-02-01 VITALS
HEART RATE: 89 BPM | BODY MASS INDEX: 33.12 KG/M2 | HEIGHT: 67 IN | DIASTOLIC BLOOD PRESSURE: 74 MMHG | WEIGHT: 211 LBS | SYSTOLIC BLOOD PRESSURE: 123 MMHG

## 2024-02-01 DIAGNOSIS — M79.671 RIGHT FOOT PAIN: Primary | ICD-10-CM

## 2024-02-01 DIAGNOSIS — L84 CORN OR CALLUS: ICD-10-CM

## 2024-02-01 PROCEDURE — 99203 OFFICE O/P NEW LOW 30 MIN: CPT | Mod: S$GLB,,, | Performed by: PODIATRIST

## 2024-02-01 PROCEDURE — 1125F AMNT PAIN NOTED PAIN PRSNT: CPT | Mod: CPTII,S$GLB,, | Performed by: PODIATRIST

## 2024-02-01 PROCEDURE — 3288F FALL RISK ASSESSMENT DOCD: CPT | Mod: CPTII,S$GLB,, | Performed by: PODIATRIST

## 2024-02-01 PROCEDURE — 1160F RVW MEDS BY RX/DR IN RCRD: CPT | Mod: CPTII,S$GLB,, | Performed by: PODIATRIST

## 2024-02-01 PROCEDURE — 1157F ADVNC CARE PLAN IN RCRD: CPT | Mod: CPTII,S$GLB,, | Performed by: PODIATRIST

## 2024-02-01 PROCEDURE — 99999 PR PBB SHADOW E&M-EST. PATIENT-LVL III: CPT | Mod: PBBFAC,,, | Performed by: PODIATRIST

## 2024-02-01 PROCEDURE — 1101F PT FALLS ASSESS-DOCD LE1/YR: CPT | Mod: CPTII,S$GLB,, | Performed by: PODIATRIST

## 2024-02-01 PROCEDURE — 1159F MED LIST DOCD IN RCRD: CPT | Mod: CPTII,S$GLB,, | Performed by: PODIATRIST

## 2024-02-05 DIAGNOSIS — N81.9 MIXED URINARY INCONTINENCE DUE TO FEMALE GENITAL PROLAPSE: ICD-10-CM

## 2024-02-05 DIAGNOSIS — R41.3 MEMORY CHANGE: ICD-10-CM

## 2024-02-05 DIAGNOSIS — I15.2 HYPERTENSION DUE TO ENDOCRINE DISORDER: Chronic | ICD-10-CM

## 2024-02-05 DIAGNOSIS — F32.A ANXIETY AND DEPRESSION: ICD-10-CM

## 2024-02-05 DIAGNOSIS — N39.46 MIXED URINARY INCONTINENCE DUE TO FEMALE GENITAL PROLAPSE: ICD-10-CM

## 2024-02-05 DIAGNOSIS — E03.9 HYPOTHYROIDISM, UNSPECIFIED TYPE: Chronic | ICD-10-CM

## 2024-02-05 DIAGNOSIS — R06.09 DOE (DYSPNEA ON EXERTION): ICD-10-CM

## 2024-02-05 DIAGNOSIS — F41.9 ANXIETY AND DEPRESSION: ICD-10-CM

## 2024-02-05 DIAGNOSIS — E78.5 HYPERLIPIDEMIA, UNSPECIFIED HYPERLIPIDEMIA TYPE: Chronic | ICD-10-CM

## 2024-02-05 DIAGNOSIS — I10 HYPERTENSION, UNSPECIFIED TYPE: Chronic | ICD-10-CM

## 2024-02-06 RX ORDER — ESCITALOPRAM OXALATE 20 MG/1
20 TABLET ORAL DAILY
Qty: 90 TABLET | Refills: 3 | Status: SHIPPED | OUTPATIENT
Start: 2024-02-06

## 2024-02-06 RX ORDER — BUPROPION HYDROCHLORIDE 150 MG/1
150 TABLET ORAL DAILY
Qty: 90 TABLET | Refills: 3 | Status: SHIPPED | OUTPATIENT
Start: 2024-02-06

## 2024-02-06 RX ORDER — VALSARTAN 320 MG/1
320 TABLET ORAL DAILY
Qty: 90 TABLET | Refills: 3 | Status: SHIPPED | OUTPATIENT
Start: 2024-02-06

## 2024-02-06 RX ORDER — DONEPEZIL HYDROCHLORIDE 5 MG/1
5 TABLET, FILM COATED ORAL NIGHTLY
Qty: 90 TABLET | Refills: 3 | Status: SHIPPED | OUTPATIENT
Start: 2024-02-06 | End: 2025-02-05

## 2024-02-06 RX ORDER — OXYBUTYNIN CHLORIDE 10 MG/1
10 TABLET, EXTENDED RELEASE ORAL DAILY
Qty: 30 TABLET | Refills: 3 | Status: SHIPPED | OUTPATIENT
Start: 2024-02-06 | End: 2024-05-09

## 2024-02-23 NOTE — PROGRESS NOTES
Chief Complaint   Patient presents with    Callouses     Patient present in clinic complaining of Corns and calluses hallux medial bilateral corn between right 1,2 digit.       HPI:   Harriet Rutherford is a 88 y.o. female who presents to clinic with concerns of a painful Callouses (Patient present in clinic complaining of Corns and calluses hallux medial bilateral corn between right 1,2 digit.)    , chronic condition with exacerbation for the past 1.2 weeks.  No acute trauma recalled.     PCP:  Manju Hawthorne MD        Patient Active Problem List   Diagnosis    Hypothyroidism    OP (osteoporosis)    HANSA (stress urinary incontinence, female)    Chronic back pain    Urgency-frequency syndrome    Vitreous detachment of both eyes    Hypertension    ALMENDAREZ (dyspnea on exertion)    Hyperlipidemia    Hypokalemia    Hallux valgus of right foot    Elevated alkaline phosphatase level    Elevated serum GGT level    Chronic constipation    History of colon polyps    Adrenal adenoma    Elevated liver enzymes    NAFLD (nonalcoholic fatty liver disease)    PAD (peripheral artery disease)    Leg swelling    Left posterior capsular opacification    Pseudophakia    Post-operative state    PIA (obstructive sleep apnea)    Other insomnia    Memory change    Lumbar pain    Decreased strength    Impaired gait    Depression    Mixed urinary incontinence due to female genital prolapse    Stage 3 chronic kidney disease, unspecified whether stage 3a or 3b CKD    Chronic bilateral low back pain without sciatica    Decreased strength of lower extremity       Current Outpatient Medications on File Prior to Visit   Medication Sig Dispense Refill    amLODIPine (NORVASC) 10 MG tablet Take 1 tablet (10 mg total) by mouth once daily. 90 tablet 3    aspirin (ECOTRIN) 81 MG EC tablet Take 81 mg by mouth once daily.      atorvastatin (LIPITOR) 40 MG tablet Take 1 tablet (40 mg total) by mouth once daily. 90 tablet 3    CA CITRATE/MGOX/VIT D3/B6/MIN  "(CITRACAL PLUS ORAL) Take 1,200 mg by mouth once daily.      diclofenac sodium (SOLARAZE) 3 % gel Apply to affected area 4-5 times daily prn pain 100 g 3    fenofibrate 160 MG Tab Take 1 tablet (160 mg total) by mouth once daily. 90 tablet 1    hydroCHLOROthiazide (HYDRODIURIL) 25 MG tablet Take 0.5 tablets (12.5 mg total) by mouth once daily. 90 tablet 1    hydrOXYzine HCL (ATARAX) 25 MG tablet Take 1 tablet (25 mg total) by mouth 3 (three) times daily as needed for Itching. 60 tablet 3    imipramine (TOFRANIL) 25 MG tablet Take 1 tablet (25 mg total) by mouth 3 (three) times daily. 270 tablet 0    levothyroxine (SYNTHROID, LEVOTHROID) 175 MCG tablet Take 1 tablet (175 mcg total) by mouth once daily. 90 tablet 3    multivitamin capsule Take 1 capsule by mouth once daily.      pantoprazole (PROTONIX) 40 MG tablet TAKE 1 TABLET(40 MG) BY MOUTH EVERY DAY 90 tablet 0    temazepam (RESTORIL) 30 mg capsule Take 1 capsule (30 mg total) by mouth nightly as needed for Insomnia. 90 capsule 0    alendronate (FOSAMAX) 70 MG tablet Take 1 tablet (70 mg total) by mouth every 7 days. 4 tablet 11     No current facility-administered medications on file prior to visit.       Review of patient's allergies indicates:   Allergen Reactions    Lisinopril Other (See Comments)     cough    Sulfur      Other reaction(s): Urticaria    Sulfa (sulfonamide antibiotics) Rash             ROS:  General ROS: negative for - chills, fatigue or fever  Cardiovascular ROS: no chest pain or dyspnea on exertion  Musculoskeletal ROS: negative for - joint pain or joint stiffness   Skin: Negative for rash, negative for nail or hair changes. Positive for  Corn/callus on the foot.       EXAM:  Vitals:    02/01/24 1304   BP: 123/74   Pulse: 89   Weight: 95.7 kg (211 lb)   Height: 5' 7" (1.702 m)        General: alert and orient and in no apparent distress.      Vasc:   Palpable pedal pulses  feet appropriately warm to touch.   Capillary refill time within " normal limits.   Edema:   1+    Neuro:   Epicritic sensation intact.   Tinels sign:  Absent  Mulders click: Absent  SWMF: Present    MSK:     overlapping toes  Muscle strength:  5/5  Joints:  without crepitus        Derm:      Hyperkeratosis in between right 1st and 2nd toe.  No open wounds.    No other open lesions, macerations, or rashes noted  Erythema:  mild at the affected location 2/2 irritation.    Bruising:  absent          ASSESSMENT / PLAN:  Problem List Items Addressed This Visit    None  Visit Diagnoses       Right foot pain    -  Primary    Corn or callus                I counseled the patient on the patient's conditions, their implications and medical management.  Chronic condition with exacerbation  [place] trimmed.  Toe spacers to try.  Avoid skin on skin contact in between the toes.   Shoe and activity modification as needed for relief.   Avoid tight shoes, especially at toe box.   Loosen laces at forefoot.     Call or return to clinic prn if these symptoms worsen or fail to improve as anticipated.       I spent a total of 30 minutes on the day of the visit.  This includes face to face time and non-face to face time preparing to see the patient (eg, review of tests), obtaining and/or reviewing separately obtained history, documenting clinical information in the electronic or other health record, independently interpreting results and communicating results to the patient/family/caregiver, or care coordinator.

## 2024-02-26 DIAGNOSIS — F41.9 ANXIETY AND DEPRESSION: ICD-10-CM

## 2024-02-26 DIAGNOSIS — I15.2 HYPERTENSION DUE TO ENDOCRINE DISORDER: Chronic | ICD-10-CM

## 2024-02-26 DIAGNOSIS — E78.5 HYPERLIPIDEMIA, UNSPECIFIED HYPERLIPIDEMIA TYPE: Chronic | ICD-10-CM

## 2024-02-26 DIAGNOSIS — R06.09 DOE (DYSPNEA ON EXERTION): ICD-10-CM

## 2024-02-26 DIAGNOSIS — F32.A ANXIETY AND DEPRESSION: ICD-10-CM

## 2024-02-26 DIAGNOSIS — E03.9 HYPOTHYROIDISM, UNSPECIFIED TYPE: Chronic | ICD-10-CM

## 2024-02-26 RX ORDER — AMLODIPINE BESYLATE 10 MG/1
10 TABLET ORAL DAILY
Qty: 90 TABLET | Refills: 3 | Status: SHIPPED | OUTPATIENT
Start: 2024-02-26

## 2024-03-12 ENCOUNTER — OFFICE VISIT (OUTPATIENT)
Dept: PRIMARY CARE CLINIC | Facility: CLINIC | Age: 89
End: 2024-03-12
Payer: COMMERCIAL

## 2024-03-12 ENCOUNTER — HOSPITAL ENCOUNTER (OUTPATIENT)
Dept: RADIOLOGY | Facility: HOSPITAL | Age: 89
Discharge: HOME | End: 2024-03-12
Attending: STUDENT IN AN ORGANIZED HEALTH CARE EDUCATION/TRAINING PROGRAM
Payer: COMMERCIAL

## 2024-03-12 VITALS
BODY MASS INDEX: 35.25 KG/M2 | HEART RATE: 90 BPM | DIASTOLIC BLOOD PRESSURE: 72 MMHG | SYSTOLIC BLOOD PRESSURE: 118 MMHG | OXYGEN SATURATION: 92 % | WEIGHT: 225.06 LBS

## 2024-03-12 DIAGNOSIS — R06.02 SHORTNESS OF BREATH ON EXERTION: Primary | ICD-10-CM

## 2024-03-12 DIAGNOSIS — I10 HYPERTENSION, UNSPECIFIED TYPE: Chronic | ICD-10-CM

## 2024-03-12 DIAGNOSIS — R06.02 SHORTNESS OF BREATH ON EXERTION: ICD-10-CM

## 2024-03-12 PROCEDURE — 71046 X-RAY EXAM CHEST 2 VIEWS: CPT | Mod: 26,,, | Performed by: RADIOLOGY

## 2024-03-12 PROCEDURE — 1126F AMNT PAIN NOTED NONE PRSNT: CPT | Mod: CPTII,S$GLB,, | Performed by: STUDENT IN AN ORGANIZED HEALTH CARE EDUCATION/TRAINING PROGRAM

## 2024-03-12 PROCEDURE — 99214 OFFICE O/P EST MOD 30 MIN: CPT | Mod: S$GLB,,, | Performed by: STUDENT IN AN ORGANIZED HEALTH CARE EDUCATION/TRAINING PROGRAM

## 2024-03-12 PROCEDURE — 1159F MED LIST DOCD IN RCRD: CPT | Mod: CPTII,S$GLB,, | Performed by: STUDENT IN AN ORGANIZED HEALTH CARE EDUCATION/TRAINING PROGRAM

## 2024-03-12 PROCEDURE — 1160F RVW MEDS BY RX/DR IN RCRD: CPT | Mod: CPTII,S$GLB,, | Performed by: STUDENT IN AN ORGANIZED HEALTH CARE EDUCATION/TRAINING PROGRAM

## 2024-03-12 PROCEDURE — 71046 X-RAY EXAM CHEST 2 VIEWS: CPT | Mod: TC

## 2024-03-12 PROCEDURE — 1157F ADVNC CARE PLAN IN RCRD: CPT | Mod: CPTII,S$GLB,, | Performed by: STUDENT IN AN ORGANIZED HEALTH CARE EDUCATION/TRAINING PROGRAM

## 2024-03-12 PROCEDURE — 99999 PR PBB SHADOW E&M-EST. PATIENT-LVL V: CPT | Mod: PBBFAC,,, | Performed by: STUDENT IN AN ORGANIZED HEALTH CARE EDUCATION/TRAINING PROGRAM

## 2024-03-13 ENCOUNTER — HOSPITAL ENCOUNTER (OUTPATIENT)
Dept: CARDIOLOGY | Facility: HOSPITAL | Age: 89
Discharge: HOME | End: 2024-03-13
Attending: STUDENT IN AN ORGANIZED HEALTH CARE EDUCATION/TRAINING PROGRAM
Payer: COMMERCIAL

## 2024-03-13 VITALS — HEIGHT: 67 IN | BODY MASS INDEX: 35.31 KG/M2 | WEIGHT: 225 LBS

## 2024-03-13 DIAGNOSIS — R06.02 SHORTNESS OF BREATH ON EXERTION: ICD-10-CM

## 2024-03-13 LAB
ASCENDING AORTA: 2.54 CM
AV INDEX (PROSTH): 0.73
AV MEAN GRADIENT: 5 MMHG
AV PEAK GRADIENT: 7 MMHG
AV VALVE AREA BY VELOCITY RATIO: 2.46 CM²
AV VALVE AREA: 2.26 CM²
AV VELOCITY RATIO: 0.8
BSA FOR ECHO PROCEDURE: 2.2 M2
CV ECHO LV RWT: 0.48 CM
DOP CALC AO PEAK VEL: 1.34 M/S
DOP CALC AO VTI: 26.3 CM
DOP CALC LVOT AREA: 3.1 CM2
DOP CALC LVOT DIAMETER: 1.98 CM
DOP CALC LVOT PEAK VEL: 1.07 M/S
DOP CALC LVOT STROKE VOLUME: 59.4 CM3
DOP CALCLVOT PEAK VEL VTI: 19.3 CM
E WAVE DECELERATION TIME: 188.59 MSEC
E/A RATIO: 1
E/E' RATIO: 11 M/S
ECHO LV POSTERIOR WALL: 0.92 CM (ref 0.6–1.1)
FRACTIONAL SHORTENING: 43 % (ref 28–44)
INTERVENTRICULAR SEPTUM: 0.93 CM (ref 0.6–1.1)
IVRT: 114.18 MSEC
LA MAJOR: 4.59 CM
LA MINOR: 4.82 CM
LA WIDTH: 3.2 CM
LEFT ATRIUM SIZE: 4.44 CM
LEFT ATRIUM VOLUME INDEX MOD: 20.4 ML/M2
LEFT ATRIUM VOLUME INDEX: 26.7 ML/M2
LEFT ATRIUM VOLUME MOD: 43.5 CM3
LEFT ATRIUM VOLUME: 56.79 CM3
LEFT INTERNAL DIMENSION IN SYSTOLE: 2.2 CM (ref 2.1–4)
LEFT VENTRICLE DIASTOLIC VOLUME INDEX: 29.6 ML/M2
LEFT VENTRICLE DIASTOLIC VOLUME: 63.05 ML
LEFT VENTRICLE MASS INDEX: 50 G/M2
LEFT VENTRICLE SYSTOLIC VOLUME INDEX: 7.6 ML/M2
LEFT VENTRICLE SYSTOLIC VOLUME: 16.24 ML
LEFT VENTRICULAR INTERNAL DIMENSION IN DIASTOLE: 3.83 CM (ref 3.5–6)
LEFT VENTRICULAR MASS: 106.32 G
LV LATERAL E/E' RATIO: 11 M/S
LV SEPTAL E/E' RATIO: 11 M/S
LVOT MG: 1.87 MMHG
LVOT MV: 0.63 CM/S
MV PEAK A VEL: 0.66 M/S
MV PEAK E VEL: 0.66 M/S
MV STENOSIS PRESSURE HALF TIME: 54.69 MS
MV VALVE AREA P 1/2 METHOD: 4.02 CM2
OHS LV EJECTION FRACTION SIMPSONS BIPLANE MOD: 65 %
PISA TR MAX VEL: 2.59 M/S
PULM VEIN S/D RATIO: 1.3
PV PEAK D VEL: 0.3 M/S
PV PEAK S VEL: 0.39 M/S
RA MAJOR: 4.62 CM
RA PRESSURE ESTIMATED: 3 MMHG
RA WIDTH: 2.77 CM
RIGHT VENTRICULAR END-DIASTOLIC DIMENSION: 3.57 CM
RV TB RVSP: 6 MMHG
RV TISSUE DOPPLER FREE WALL SYSTOLIC VELOCITY 1 (APICAL 4 CHAMBER VIEW): 7.88 CM/S
SINUS: 2.54 CM
STJ: 2.63 CM
TDI LATERAL: 0.06 M/S
TDI SEPTAL: 0.06 M/S
TDI: 0.06 M/S
TR MAX PG: 27 MMHG
TRICUSPID ANNULAR PLANE SYSTOLIC EXCURSION: 1.78 CM
TV REST PULMONARY ARTERY PRESSURE: 30 MMHG
Z-SCORE OF LEFT VENTRICULAR DIMENSION IN END DIASTOLE: -5.72
Z-SCORE OF LEFT VENTRICULAR DIMENSION IN END SYSTOLE: -5

## 2024-03-13 PROCEDURE — 93306 TTE W/DOPPLER COMPLETE: CPT | Mod: 26,,, | Performed by: STUDENT IN AN ORGANIZED HEALTH CARE EDUCATION/TRAINING PROGRAM

## 2024-03-13 PROCEDURE — 93306 TTE W/DOPPLER COMPLETE: CPT

## 2024-03-17 NOTE — PROGRESS NOTES
Subjective:       Patient ID: Harriet Rutherford is a 88 y.o. female.   Chief Complaint: Shortness of Breath      Shortness of breath:  Worse over the past few weeks/months.   Recent URI a few weeks ago  SOB is with exertion  Known former smoker (stopped in 2016)  No cough  No fevers  No cp    HTN: on amlodipine 10mg, hctz 12.5 mg, and valsartan 320 mg daily. Tolerating without issues.    Review of Systems   Constitutional:  Negative for fatigue and fever.   Respiratory:  Positive for shortness of breath. Negative for cough.    Cardiovascular:  Negative for chest pain.   Gastrointestinal:  Negative for abdominal pain, diarrhea, nausea and vomiting.   Musculoskeletal:  Negative for back pain.   Neurological:  Negative for weakness.              Objective:        Physical Exam  HENT:      Head: Normocephalic and atraumatic.      Nose: Nose normal.      Mouth/Throat:      Mouth: Mucous membranes are moist.      Pharynx: Oropharynx is clear.   Eyes:      Extraocular Movements: Extraocular movements intact.      Conjunctiva/sclera: Conjunctivae normal.      Pupils: Pupils are equal, round, and reactive to light.   Cardiovascular:      Rate and Rhythm: Normal rate and regular rhythm.   Pulmonary:      Effort: Pulmonary effort is normal.      Breath sounds: Normal breath sounds.   Abdominal:      General: There is no distension.      Palpations: Abdomen is soft.      Tenderness: There is no abdominal tenderness.   Musculoskeletal:         General: No swelling. Normal range of motion.      Cervical back: Normal range of motion.      Right lower leg: No edema.      Left lower leg: No edema.   Skin:     General: Skin is warm.      Findings: No lesion or rash.   Neurological:      General: No focal deficit present.      Mental Status: She is alert and oriented to person, place, and time.      Motor: No weakness.   Psychiatric:         Mood and Affect: Mood normal.         Thought Content: Thought content normal.         Assessment:          Problem List Items Addressed This Visit          Cardiac/Vascular    Hypertension (Chronic)     Other Visit Diagnoses       Shortness of breath on exertion    -  Primary    Relevant Orders    Echo Saline Bubble? No (Completed)    X-Ray Chest PA And Lateral (Completed)    Ambulatory referral/consult to Cardiology    B-TYPE NATRIURETIC PEPTIDE (Completed)    COMPREHENSIVE METABOLIC PANEL (Completed)    CBC W/ AUTO DIFFERENTIAL (Completed)              Plan:         1. Shortness of breath on exertion  -     Echo Saline Bubble? No; Future  -     X-Ray Chest PA And Lateral; Future; Expected date: 03/12/2024  -     Ambulatory referral/consult to Cardiology; Future; Expected date: 03/19/2024  -     B-TYPE NATRIURETIC PEPTIDE; Future; Expected date: 03/12/2024  -     COMPREHENSIVE METABOLIC PANEL; Future; Expected date: 03/12/2024  -     CBC W/ AUTO DIFFERENTIAL; Future; Expected date: 03/12/2024  Follow up Echo and chest xray  Follow up lab work  Referral placed to cardiology    2. Hypertension, unspecified type  Stable on medications, continue regimen            Manju Hawthorne MD

## 2024-03-18 ENCOUNTER — OFFICE VISIT (OUTPATIENT)
Dept: UROLOGY | Facility: CLINIC | Age: 89
End: 2024-03-18
Payer: COMMERCIAL

## 2024-03-18 VITALS
SYSTOLIC BLOOD PRESSURE: 122 MMHG | WEIGHT: 224.88 LBS | HEART RATE: 87 BPM | HEIGHT: 67 IN | DIASTOLIC BLOOD PRESSURE: 75 MMHG | BODY MASS INDEX: 35.29 KG/M2

## 2024-03-18 DIAGNOSIS — N39.3 SUI (STRESS URINARY INCONTINENCE, FEMALE): Primary | ICD-10-CM

## 2024-03-18 DIAGNOSIS — N39.46 MIXED URINARY INCONTINENCE DUE TO FEMALE GENITAL PROLAPSE: ICD-10-CM

## 2024-03-18 DIAGNOSIS — N32.81 URGENCY-FREQUENCY SYNDROME: ICD-10-CM

## 2024-03-18 DIAGNOSIS — N81.9 MIXED URINARY INCONTINENCE DUE TO FEMALE GENITAL PROLAPSE: ICD-10-CM

## 2024-03-18 PROCEDURE — 1159F MED LIST DOCD IN RCRD: CPT | Mod: CPTII,S$GLB,, | Performed by: UROLOGY

## 2024-03-18 PROCEDURE — 1157F ADVNC CARE PLAN IN RCRD: CPT | Mod: CPTII,S$GLB,, | Performed by: UROLOGY

## 2024-03-18 PROCEDURE — 3288F FALL RISK ASSESSMENT DOCD: CPT | Mod: CPTII,S$GLB,, | Performed by: UROLOGY

## 2024-03-18 PROCEDURE — 99214 OFFICE O/P EST MOD 30 MIN: CPT | Mod: S$GLB,,, | Performed by: UROLOGY

## 2024-03-18 PROCEDURE — 99999 PR PBB SHADOW E&M-EST. PATIENT-LVL IV: CPT | Mod: PBBFAC,,, | Performed by: UROLOGY

## 2024-03-18 PROCEDURE — 1126F AMNT PAIN NOTED NONE PRSNT: CPT | Mod: CPTII,S$GLB,, | Performed by: UROLOGY

## 2024-03-18 PROCEDURE — 1101F PT FALLS ASSESS-DOCD LE1/YR: CPT | Mod: CPTII,S$GLB,, | Performed by: UROLOGY

## 2024-03-18 RX ORDER — VIBEGRON 75 MG/1
1 TABLET, FILM COATED ORAL DAILY
Qty: 30 TABLET | Refills: 11 | Status: SHIPPED | OUTPATIENT
Start: 2024-03-18 | End: 2025-03-18

## 2024-03-18 NOTE — PROGRESS NOTES
CC: s/p Bulkimed injection for HANSA, hx of mixed urinary incontinence    Harriet Rutherford is a 88 y.o. woman who is here for the evaluation of Follow-up (PT STATES SHE IS HERE FOR A FOLLOW UP VISIT. STATES SHE IS HAVING INCONTINENCE AGAIN AND STATES THE BOTOX IS NO LONGER WORKING.)  Her PCP, Manju Hawthorne MD     Date: 01/18/2023  Pre-Op Diagnosis: HANSA  Post-Op Diagnosis: same  Procedure(s) Performed:   1.  Cystoscopy with transurethral injection of urethral bulking agent (Bulkamid)  Findings: good coaptation of the urethra    C/o frequency, urgency and urge incontinence.  Uses #4 x 2 pads a day   She is on imipramine TID and oxybutynin xl daily.  She is very pleased with the outcome after urethral bulking injection.    s/p Monarc Sling surgery done by me back in 1/2/08, c/o mixed incontinence. Thus she was started on impramine 25 mg TID and Detrol [place].  With these medications, her urine control is very good.  However, she reported that Detrol [place] costs more than $230 a month.  She is interested in alternative medication that is much cheaper than Detrol [place]      Urination symptoms: Positive for frequency, urgency, nocturia and mixed urinary incontinence.  Denies flank pain, dysuria, hematuria       Past Medical History:   Diagnosis Date    Breast cyst     Cataract     Chronic back pain     Hepatic steatosis 10/19/2016    Hyperlipidemia LDL goal <130     Hypertension     Hypothyroidism     OP (osteoporosis)     Smoker     Stress incontinence     Urinary tract infection      Past Surgical History:   Procedure Laterality Date    BLADDER SURGERY      lifted     BREAST CYST ASPIRATION      CATARACT EXTRACTION      Both eyes    COLONOSCOPY N/A 05/19/2016    Procedure: COLONOSCOPY;  Surgeon: Wander Mahajan MD;  Location: [address]);  Service: Endoscopy;  Laterality: N/A;  Per Dr. Mahajan use Prepopik     COSMETIC SURGERY  1920    CYSTOSCOPY      CYSTOSCOPY WITH INJECTION OF PERIURETHRAL BULKING AGENT N/A 01/18/2023     Procedure: CYSTOSCOPY, WITH PERIURETHRAL BULKING AGENT INJECTION;  Surgeon: Patel Kumar MD;  Location: [facility] OR [address] [place] [address];  Service: Urology;  Laterality: N/A;  30 MIN    EYE SURGERY      FOOT SURGERY Right 2015    OSTEOTOMY-METATARSAL    HYSTERECTOMY      OOPHORECTOMY      TONSILLECTOMY       Social History     Tobacco Use    Smoking status: Former     Current packs/day: 0.00     Average packs/day: 1 pack/day for 65.9 years (65.9 ttl pk-yrs)     Types: Cigarettes     Start date: 1950     Quit date: 2016     Years since quittin.6    Smokeless tobacco: Never   Substance Use Topics    Alcohol use: Yes     Alcohol/week: 1.0 standard drink of alcohol     Types: 1 Glasses of wine per week     Comment: RARELY    Drug use: No     Family History   Problem Relation Age of Onset    Cataracts Mother     Cataracts Father     Hypertension Father     Stroke Father     Heart attack Father     Heart disease Father     Heart failure Father     Alcohol abuse Father     Cataracts Sister     Cataracts Sister     Cancer Sister          throid cancer    Cancer Brother     Cataracts Brother     Cataracts Maternal Grandmother     Heart attack Paternal Grandfather     Heart disease Paternal Grandfather     Heart failure Paternal Grandfather     Breast cancer Daughter     Breast cancer Daughter     Cancer Sister         Deveased. Multiple mylomz    Hearing loss Sister     Amblyopia Neg Hx     Blindness Neg Hx     Glaucoma Neg Hx     Macular degeneration Neg Hx     Strabismus Neg Hx     Retinal detachment Neg Hx     Anesthesia problems Neg Hx      Allergy:  Review of patient's allergies indicates:   Allergen Reactions    Lisinopril Other (See Comments)     cough    Sulfur      Other reaction(s): Urticaria    Sulfa (sulfonamide antibiotics) Rash     Outpatient Encounter Medications as of 3/18/2024   Medication Sig Dispense Refill    amLODIPine (NORVASC) 10 MG tablet Take 1 tablet (10 mg total) by mouth once  daily. 90 tablet 3    aspirin (ECOTRIN) 81 MG EC tablet Take 81 mg by mouth once daily.      atorvastatin (LIPITOR) 40 MG tablet Take 1 tablet (40 mg total) by mouth once daily. 90 tablet 3    buPROPion (WELLBUTRIN XL) 150 MG TB24 tablet Take 1 tablet (150 mg total) by mouth once daily. 90 tablet 3    CA CITRATE/MGOX/VIT D3/B6/MIN (CITRACAL PLUS ORAL) Take 1,200 mg by mouth once daily.      diclofenac sodium (SOLARAZE) 3 % gel Apply to affected area 4-5 times daily prn pain 100 g 3    donepeziL (ARICEPT) 5 MG tablet Take 1 tablet (5 mg total) by mouth every evening. 90 tablet 3    EScitalopram oxalate (LEXAPRO) 20 MG tablet Take 1 tablet (20 mg total) by mouth once daily. 90 tablet 3    fenofibrate 160 MG Tab Take 1 tablet (160 mg total) by mouth once daily. 90 tablet 1    hydroCHLOROthiazide (HYDRODIURIL) 25 MG tablet Take 0.5 tablets (12.5 mg total) by mouth once daily. 90 tablet 1    hydrOXYzine HCL (ATARAX) 25 MG tablet Take 1 tablet (25 mg total) by mouth 3 (three) times daily as needed for Itching. 60 tablet 3    imipramine (TOFRANIL) 25 MG tablet Take 1 tablet (25 mg total) by mouth 3 (three) times daily. 270 tablet 0    levothyroxine (SYNTHROID, LEVOTHROID) 175 MCG tablet Take 1 tablet (175 mcg total) by mouth once daily. 90 tablet 3    multivitamin capsule Take 1 capsule by mouth once daily.      oxybutynin (DITROPAN-XL) 10 MG 24 hr tablet Take 1 tablet (10 mg total) by mouth once daily. 30 tablet 3    pantoprazole (PROTONIX) 40 MG tablet TAKE 1 TABLET(40 MG) BY MOUTH EVERY DAY 90 tablet 0    temazepam (RESTORIL) 30 mg capsule Take 1 capsule (30 mg total) by mouth nightly as needed for Insomnia. 90 capsule 0    valsartan (DIOVAN) 320 MG tablet Take 1 tablet (320 mg total) by mouth once daily. 90 tablet 3    alendronate (FOSAMAX) 70 MG tablet Take 1 tablet (70 mg total) by mouth every 7 days. 4 tablet 11    vibegron (GEMTESA) 75 mg Tab Take 1 tablet (75 mg total) by mouth once daily. 30 tablet 11     No  facility-administered encounter medications on file as of 3/18/2024.     Review of Systems   ROS  Physical Exam     Vitals:    03/18/24 1532   BP: 122/75   Pulse: 87     Physical Exam      LABS:  Lab Results   Component Value Date    CREATININE 1.2 03/12/2024    CREATININE 1.2 07/31/2023    CREATININE 1.2 01/09/2023     Urine Culture, Routine   Date Value Ref Range Status   01/09/2023 No significant growth  Final   07/19/2017 ESCHERICHIA COLI  >100,000 cfu/ml    Final     Hemoglobin A1C   Date Value Ref Range Status   07/31/2023 5.2 4.0 - 5.6 % Final     Comment:     ADA Screening Guidelines:  5.7-6.4%  Consistent with prediabetes  >or=6.5%  Consistent with diabetes    High levels of fetal hemoglobin interfere with the HbA1C  assay. Heterozygous hemoglobin variants (HbS, HgC, etc)do  not significantly interfere with this assay.   However, presence of multiple variants may affect accuracy.     09/27/2022 5.2 4.0 - 5.6 % Final     Comment:     ADA Screening Guidelines:  5.7-6.4%  Consistent with prediabetes  >or=6.5%  Consistent with diabetes    High levels of fetal hemoglobin interfere with the HbA1C  assay. Heterozygous hemoglobin variants (HbS, HgC, etc)do  not significantly interfere with this assay.   However, presence of multiple variants may affect accuracy.       Radiology:    Assessment and Plan:  Harriet was seen today for follow-up.    Diagnoses and all orders for this visit:    HANSA (stress urinary incontinence, female)    Urgency-frequency syndrome  -     vibegron (GEMTESA) 75 mg Tab; Take 1 tablet (75 mg total) by mouth once daily.  -     Urine culture; Future    Mixed urinary incontinence due to female genital prolapse        Doing well on HANSA since bulkamid urethral injection.  However, she is still bothered by OAB and urge incontinence.  Has been on imipramine TID and oxybutynin xl 10 mg daily  Use premarin cream applied to the urethra QOD.    Recommend to add Gemtesa daily in addition to oxybutynin  xl.    Alternative options including sacral neuromodulation (SNM) explained in detail as well as cysto botox injection, which I don't prefer as much as SNM.    I spent 25 minutes with the patient of which more than half was spent in direct consultation with the patient in regards to our treatment and plan.     Follow-up:  Follow up in about 6 months (around 9/18/2024).

## 2024-03-22 ENCOUNTER — HOSPITAL ENCOUNTER (OUTPATIENT)
Dept: RADIOLOGY | Facility: HOSPITAL | Age: 89
Discharge: HOME | End: 2024-03-22
Attending: STUDENT IN AN ORGANIZED HEALTH CARE EDUCATION/TRAINING PROGRAM
Payer: COMMERCIAL

## 2024-03-22 ENCOUNTER — OFFICE VISIT (OUTPATIENT)
Dept: CARDIOLOGY | Facility: CLINIC | Age: 89
End: 2024-03-22
Payer: COMMERCIAL

## 2024-03-22 VITALS
DIASTOLIC BLOOD PRESSURE: 72 MMHG | HEIGHT: 67 IN | HEART RATE: 89 BPM | SYSTOLIC BLOOD PRESSURE: 116 MMHG | WEIGHT: 225.06 LBS | BODY MASS INDEX: 35.33 KG/M2

## 2024-03-22 VITALS — BODY MASS INDEX: 35.16 KG/M2 | HEIGHT: 67 IN | WEIGHT: 224 LBS

## 2024-03-22 DIAGNOSIS — R06.02 SHORTNESS OF BREATH ON EXERTION: ICD-10-CM

## 2024-03-22 DIAGNOSIS — I20.89 ANGINAL EQUIVALENT: ICD-10-CM

## 2024-03-22 DIAGNOSIS — I73.9 PAD (PERIPHERAL ARTERY DISEASE): ICD-10-CM

## 2024-03-22 DIAGNOSIS — I15.2 HYPERTENSION DUE TO ENDOCRINE DISORDER: ICD-10-CM

## 2024-03-22 DIAGNOSIS — I10 PRIMARY HYPERTENSION: Chronic | ICD-10-CM

## 2024-03-22 DIAGNOSIS — E66.01 SEVERE OBESITY (BMI 35.0-39.9) WITH COMORBIDITY: ICD-10-CM

## 2024-03-22 DIAGNOSIS — E78.2 MIXED HYPERLIPIDEMIA: ICD-10-CM

## 2024-03-22 DIAGNOSIS — Z12.31 ENCOUNTER FOR SCREENING MAMMOGRAM FOR BREAST CANCER: ICD-10-CM

## 2024-03-22 DIAGNOSIS — I50.33 ACUTE ON CHRONIC DIASTOLIC CONGESTIVE HEART FAILURE: Primary | ICD-10-CM

## 2024-03-22 DIAGNOSIS — R06.09 DOE (DYSPNEA ON EXERTION): ICD-10-CM

## 2024-03-22 PROCEDURE — 99999 PR PBB SHADOW E&M-EST. PATIENT-LVL IV: CPT | Mod: PBBFAC,,, | Performed by: INTERNAL MEDICINE

## 2024-03-22 PROCEDURE — 1157F ADVNC CARE PLAN IN RCRD: CPT | Mod: CPTII,S$GLB,, | Performed by: INTERNAL MEDICINE

## 2024-03-22 PROCEDURE — 77063 BREAST TOMOSYNTHESIS BI: CPT | Mod: 26,,, | Performed by: RADIOLOGY

## 2024-03-22 PROCEDURE — 3288F FALL RISK ASSESSMENT DOCD: CPT | Mod: CPTII,S$GLB,, | Performed by: INTERNAL MEDICINE

## 2024-03-22 PROCEDURE — 77067 SCR MAMMO BI INCL CAD: CPT | Mod: 26,,, | Performed by: RADIOLOGY

## 2024-03-22 PROCEDURE — 1160F RVW MEDS BY RX/DR IN RCRD: CPT | Mod: CPTII,S$GLB,, | Performed by: INTERNAL MEDICINE

## 2024-03-22 PROCEDURE — 1101F PT FALLS ASSESS-DOCD LE1/YR: CPT | Mod: CPTII,S$GLB,, | Performed by: INTERNAL MEDICINE

## 2024-03-22 PROCEDURE — 99205 OFFICE O/P NEW HI 60 MIN: CPT | Mod: S$GLB,,, | Performed by: INTERNAL MEDICINE

## 2024-03-22 PROCEDURE — 77067 SCR MAMMO BI INCL CAD: CPT | Mod: TC

## 2024-03-22 PROCEDURE — 93000 ELECTROCARDIOGRAM COMPLETE: CPT | Mod: S$GLB,,, | Performed by: INTERNAL MEDICINE

## 2024-03-22 PROCEDURE — 1159F MED LIST DOCD IN RCRD: CPT | Mod: CPTII,S$GLB,, | Performed by: INTERNAL MEDICINE

## 2024-03-22 RX ORDER — DAPAGLIFLOZIN 10 MG/1
10 TABLET, FILM COATED ORAL DAILY
Qty: 30 TABLET | Refills: 11 | Status: ACTIVE | OUTPATIENT
Start: 2024-03-22 | End: 2025-03-23

## 2024-03-22 NOTE — PROGRESS NOTES
Subjective:   03/22/2024     Patient ID:  Harriet Rutherford is a 88 y.o. female who presents for evaulation of Shortness of Breath      Comes in today for evaluation of severe dyspnea on exertion, this has been present for several months, continues to be grew worse though.  She does not have a history of heart problems.  A recent echocardiogram showed normal systolic function, no diastolic dysfunction noted.  A recent BNP was normal.  Laboratory was otherwise unremarkable.      A chest x-ray was unremarkable also.      Review of her chart shows that she does have a diagnosis previously of pheochromocytoma.  I do not see that this was ever resolved by Endocrine.    Hypercholesterolemia is treated with high-dose statin therapy, last LDL cholesterol 101.      Extensive chart review performed.      Past Medical History:   Diagnosis Date    Breast cyst     Cataract     Chronic back pain     Hepatic steatosis 10/19/2016    Hyperlipidemia LDL goal <130     Hypertension     Hypothyroidism     OP (osteoporosis)     Smoker     Stress incontinence     Urinary tract infection        Review of patient's allergies indicates:   Allergen Reactions    Lisinopril Other (See Comments)     cough    Sulfur      Other reaction(s): Urticaria    Sulfa (sulfonamide antibiotics) Rash         Current Outpatient Medications:     alendronate (FOSAMAX) 70 MG tablet, Take 1 tablet (70 mg total) by mouth every 7 days., Disp: 4 tablet, Rfl: 11    amLODIPine (NORVASC) 10 MG tablet, Take 1 tablet (10 mg total) by mouth once daily., Disp: 90 tablet, Rfl: 3    aspirin (ECOTRIN) 81 MG EC tablet, Take 81 mg by mouth once daily., Disp: , Rfl:     atorvastatin (LIPITOR) 40 MG tablet, Take 1 tablet (40 mg total) by mouth once daily., Disp: 90 tablet, Rfl: 3    buPROPion (WELLBUTRIN XL) 150 MG TB24 tablet, Take 1 tablet (150 mg total) by mouth once daily., Disp: 90 tablet, Rfl: 3    CA CITRATE/MGOX/VIT D3/B6/MIN (CITRACAL PLUS ORAL), Take 1,200  mg by mouth once daily., Disp: , Rfl:     diclofenac sodium (SOLARAZE) 3 % gel, Apply to affected area 4-5 times daily prn pain, Disp: 100 g, Rfl: 3    donepeziL (ARICEPT) 5 MG tablet, Take 1 tablet (5 mg total) by mouth every evening., Disp: 90 tablet, Rfl: 3    EScitalopram oxalate (LEXAPRO) 20 MG tablet, Take 1 tablet (20 mg total) by mouth once daily., Disp: 90 tablet, Rfl: 3    fenofibrate 160 MG Tab, Take 1 tablet (160 mg total) by mouth once daily., Disp: 90 tablet, Rfl: 1    hydroCHLOROthiazide (HYDRODIURIL) 25 MG tablet, Take 0.5 tablets (12.5 mg total) by mouth once daily., Disp: 90 tablet, Rfl: 1    hydrOXYzine HCL (ATARAX) 25 MG tablet, Take 1 tablet (25 mg total) by mouth 3 (three) times daily as needed for Itching., Disp: 60 tablet, Rfl: 3    imipramine (TOFRANIL) 25 MG tablet, Take 1 tablet (25 mg total) by mouth 3 (three) times daily., Disp: 270 tablet, Rfl: 0    levothyroxine (SYNTHROID, LEVOTHROID) 175 MCG tablet, Take 1 tablet (175 mcg total) by mouth once daily., Disp: 90 tablet, Rfl: 3    multivitamin capsule, Take 1 capsule by mouth once daily., Disp: , Rfl:     oxybutynin (DITROPAN-XL) 10 MG 24 hr tablet, Take 1 tablet (10 mg total) by mouth once daily., Disp: 30 tablet, Rfl: 3    pantoprazole (PROTONIX) 40 MG tablet, TAKE 1 TABLET(40 MG) BY MOUTH EVERY DAY, Disp: 90 tablet, Rfl: 0    temazepam (RESTORIL) 30 mg capsule, Take 1 capsule (30 mg total) by mouth nightly as needed for Insomnia., Disp: 90 capsule, Rfl: 0    valsartan (DIOVAN) 320 MG tablet, Take 1 tablet (320 mg total) by mouth once daily., Disp: 90 tablet, Rfl: 3    vibegron (GEMTESA) 75 mg Tab, Take 1 tablet (75 mg total) by mouth once daily., Disp: 30 tablet, Rfl: 11    dapagliflozin propanediol (FARXIGA) 10 mg tablet, Take 1 tablet (10 mg total) by mouth once daily., Disp: 30 tablet, Rfl: 11     Objective:   Review of Systems   Cardiovascular:  Positive for dyspnea on exertion. Negative for chest pain,  claudication, cyanosis, irregular heartbeat, leg swelling, near-syncope, orthopnea, palpitations, paroxysmal nocturnal dyspnea and syncope.         Vitals:    03/22/24 1306   BP: 116/72   Pulse: 89     Wt Readings from Last 3 Encounters:   03/22/24 102.1 kg (225 lb 1.4 oz)   03/22/24 101.6 kg (224 lb)   03/18/24 102 kg (224 lb 13.9 oz)     Temp Readings from Last 3 Encounters:   01/18/23 97.5 °F (36.4 °C) (Temporal)   12/22/22 97.3 °F (36.3 °C) (Temporal)   09/27/22 97.9 °F (36.6 °C) (Oral)     BP Readings from Last 3 Encounters:   03/22/24 116/72   03/18/24 122/75   03/12/24 118/72     Pulse Readings from Last 3 Encounters:   03/22/24 89   03/18/24 87   03/12/24 90             Physical Exam  Vitals reviewed.   Constitutional:       General: She is not in acute distress.     Appearance: She is well-developed.   HENT:      Head: Normocephalic and atraumatic.      Nose: Nose normal.   Eyes:      Conjunctiva/sclera: Conjunctivae normal.      Pupils: Pupils are equal, round, and reactive to light.   Neck:      Vascular: Hepatojugular reflux present. No carotid bruit or JVD.   Cardiovascular:      Rate and Rhythm: Normal rate and regular rhythm.      Pulses: Normal pulses and intact distal pulses.      Heart sounds: Normal heart sounds. No murmur heard.     No friction rub. No gallop.   Pulmonary:      Effort: Pulmonary effort is normal. No respiratory distress.      Breath sounds: Normal breath sounds. No wheezing or rales.   Chest:      Chest wall: No tenderness.   Abdominal:      General: Bowel sounds are normal. There is no distension.      Palpations: Abdomen is soft.      Tenderness: There is no abdominal tenderness.   Musculoskeletal:         General: No tenderness or deformity. Normal range of motion.      Cervical back: Normal range of motion and neck supple.      Right lower leg: Edema present.      Left lower leg: Edema present.   Skin:     General: Skin is warm and dry.      Findings: No erythema or rash.    Neurological:      Mental Status: She is alert and oriented to person, place, and time.      Cranial Nerves: No cranial nerve deficit.      Motor: No abnormal muscle tone.      Coordination: Coordination normal.   Psychiatric:         Behavior: Behavior normal.         Thought Content: Thought content normal.         Judgment: Judgment normal.         Lab Results   Component Value Date    CHOL 177 07/31/2023    CHOL 176 09/27/2022    CHOL 174 04/19/2022     Lab Results   Component Value Date    HDL 49 07/31/2023    HDL 54 09/27/2022    HDL 51 04/19/2022     Lab Results   Component Value Date    LDLCALC 101.4 07/31/2023    LDLCALC 88.4 09/27/2022    LDLCALC 90.2 04/19/2022     Lab Results   Component Value Date    ALT 35 03/12/2024    AST 30 03/12/2024    AST 22 07/31/2023    AST 24 10/04/2022     Lab Results   Component Value Date    CREATININE 1.2 03/12/2024    BUN 20 03/12/2024     03/12/2024    K 4.5 03/12/2024    CO2 24 03/12/2024    CO2 24 07/31/2023    CO2 27 01/09/2023     Lab Results   Component Value Date    HGB 13.3 03/12/2024    HCT 42.8 03/12/2024    HCT 40.7 07/31/2023    HCT 40.2 01/09/2023               EKG today shows normal sinus rhythm, right bundle-branch block which is in bleed, poor R-wave progression.          Assessment and Plan:     Acute on chronic diastolic congestive heart failure  -     dapagliflozin propanediol (FARXIGA) 10 mg tablet; Take 1 tablet (10 mg total) by mouth once daily.  Dispense: 30 tablet; Refill: 11  -     BASIC METABOLIC PANEL; Future; Expected date: 04/22/2024    Shortness of breath on exertion  -     Ambulatory referral/consult to Cardiology  -     IN OFFICE EKG 12-LEAD (to Muse)  -     Cardiac PET Scan Stress; Future; Expected date: 03/23/2024    Severe obesity (BMI 35.0-39.9) with comorbidity  -     IN OFFICE EKG 12-LEAD (to Muse)    PAD (peripheral artery disease)  -     IN OFFICE EKG 12-LEAD (to Muse)    ALMENDAREZ (dyspnea on exertion)  -     IN OFFICE EKG  12-LEAD (to Muse)    Mixed hyperlipidemia  -     IN OFFICE EKG 12-LEAD (to Muse)    Primary hypertension  -     IN OFFICE EKG 12-LEAD (to Muse)    Anginal equivalent  -     Cardiac PET Scan Stress; Future; Expected date: 03/23/2024    Hypertension due to endocrine disorder       The patient does have evidence for volume overload with positive hepatojugular reflux.  This could be causing shortness of breath due to diastolic dysfunction.  Will treat as diastolic dysfunction with the addition of Farxiga toHer medications.  PET stress test will be obtained to rule out significant coronary artery disease.    Follow up in about 3 months (around 6/22/2024).          Future Appointments   Date Time Provider Department Center   3/28/2024  9:30 AM CARDIAC, PET IMAGING [facility] ZENY Lucas [facility]   6/24/2024  8:00 AM Manju Hawthorne MD [facility] JAYLON Ocasio

## 2024-03-25 ENCOUNTER — TELEPHONE (OUTPATIENT)
Dept: PRIMARY CARE CLINIC | Facility: CLINIC | Age: 89
End: 2024-03-25
Payer: COMMERCIAL

## 2024-03-25 NOTE — TELEPHONE ENCOUNTER
----- Message from Manju Hawthorne MD sent at 3/25/2024  4:43 PM CDT -----  Please call and let pt know her she needs an additional imaging with ultrasound in order to complete her breast cancer screening. Her breast tissue did not appear equal on the mammogram so this imaging will help the radiologist make sure everything is ok. The breast center should be contacting her soon to get set up.

## 2024-03-26 ENCOUNTER — TELEPHONE (OUTPATIENT)
Dept: CARDIOLOGY | Facility: HOSPITAL | Age: 89
End: 2024-03-26
Payer: COMMERCIAL

## 2024-03-26 LAB
OHS QRS DURATION: 80 MS
OHS QTC CALCULATION: 450 MS

## 2024-03-28 ENCOUNTER — HOSPITAL ENCOUNTER (OUTPATIENT)
Dept: CARDIOLOGY | Facility: HOSPITAL | Age: 89
Discharge: HOME | End: 2024-03-28
Attending: INTERNAL MEDICINE
Payer: COMMERCIAL

## 2024-03-28 VITALS
SYSTOLIC BLOOD PRESSURE: 156 MMHG | HEART RATE: 80 BPM | WEIGHT: 225 LBS | DIASTOLIC BLOOD PRESSURE: 73 MMHG | BODY MASS INDEX: 35.31 KG/M2 | HEIGHT: 67 IN

## 2024-03-28 DIAGNOSIS — I20.89 ANGINAL EQUIVALENT: ICD-10-CM

## 2024-03-28 DIAGNOSIS — R06.02 SHORTNESS OF BREATH ON EXERTION: ICD-10-CM

## 2024-03-28 LAB
CFR FLOW - ANTERIOR: 2.24
CFR FLOW - INFERIOR: 2.22
CFR FLOW - LATERAL: 2.07
CFR FLOW - MAX: 2.87
CFR FLOW - MIN: 1.72
CFR FLOW - SEPTAL: 2.5
CFR FLOW - WHOLE HEART: 2.26
CV PHARM DOSE: 0.4 MG
CV STRESS BASE HR: 80 BPM
DIASTOLIC BLOOD PRESSURE: 73 MMHG
EJECTION FRACTION- HIGH: 59 %
END DIASTOLIC INDEX-HIGH: 155 ML/M2
END DIASTOLIC INDEX-LOW: 91 ML/M2
END SYSTOLIC INDEX-HIGH: 78 ML/M2
END SYSTOLIC INDEX-LOW: 40 ML/M2
NUC REST DIASTOLIC VOLUME INDEX: 74
NUC REST EJECTION FRACTION: 76
NUC REST SYSTOLIC VOLUME INDEX: 18
NUC STRESS DIASTOLIC VOLUME INDEX: 71
NUC STRESS EJECTION FRACTION: 76 %
NUC STRESS SYSTOLIC VOLUME INDEX: 17
OHS CV CPX 85 PERCENT MAX PREDICTED HEART RATE MALE: 114
OHS CV CPX MAX PREDICTED HEART RATE: 134
OHS CV CPX PATIENT IS FEMALE: 1
OHS CV CPX PATIENT IS MALE: 0
OHS CV CPX PEAK DIASTOLIC BLOOD PRESSURE: 41 MMHG
OHS CV CPX PEAK HEAR RATE: 76 BPM
OHS CV CPX PEAK RATE PRESSURE PRODUCT: 9424
OHS CV CPX PEAK SYSTOLIC BLOOD PRESSURE: 124 MMHG
OHS CV CPX PERCENT MAX PREDICTED HEART RATE ACHIEVED: 58
OHS CV CPX RATE PRESSURE PRODUCT PRESENTING: NORMAL
REST FLOW - ANTERIOR: 0.49 CC/MIN/G
REST FLOW - INFERIOR: 0.43 CC/MIN/G
REST FLOW - LATERAL: 0.57 CC/MIN/G
REST FLOW - MAX: 0.7 CC/MIN/G
REST FLOW - MIN: 0.26 CC/MIN/G
REST FLOW - SEPTAL: 0.43 CC/MIN/G
REST FLOW - WHOLE HEART: 0.48 CC/MIN/G
RETIRED EF AND QEF - SEE NOTES: 47 %
STRESS FLOW - ANTERIOR: 1.09 CC/MIN/G
STRESS FLOW - INFERIOR: 0.95 CC/MIN/G
STRESS FLOW - LATERAL: 1.18 CC/MIN/G
STRESS FLOW - MAX: 1.51 CC/MIN/G
STRESS FLOW - MIN: 0.52 CC/MIN/G
STRESS FLOW - SEPTAL: 1.07 CC/MIN/G
STRESS FLOW - WHOLE HEART: 1.07 CC/MIN/G
SYSTOLIC BLOOD PRESSURE: 156 MMHG

## 2024-03-28 PROCEDURE — 63600175 PHARM REV CODE 636 W HCPCS: Performed by: INTERNAL MEDICINE

## 2024-03-28 PROCEDURE — 93018 CV STRESS TEST I&R ONLY: CPT | Mod: ,,, | Performed by: INTERNAL MEDICINE

## 2024-03-28 PROCEDURE — 78434 AQMBF PET REST & RX STRESS: CPT

## 2024-03-28 PROCEDURE — 78431 MYOCRD IMG PET RST&STRS CT: CPT | Mod: 26,,, | Performed by: INTERNAL MEDICINE

## 2024-03-28 PROCEDURE — A9555 RB82 RUBIDIUM: HCPCS | Performed by: INTERNAL MEDICINE

## 2024-03-28 PROCEDURE — 93016 CV STRESS TEST SUPVJ ONLY: CPT | Mod: ,,, | Performed by: INTERNAL MEDICINE

## 2024-03-28 PROCEDURE — 78434 AQMBF PET REST & RX STRESS: CPT | Mod: 26,,, | Performed by: INTERNAL MEDICINE

## 2024-03-28 RX ORDER — REGADENOSON 0.08 MG/ML
0.4 INJECTION, SOLUTION INTRAVENOUS
Status: COMPLETED | OUTPATIENT
Start: 2024-03-28 | End: 2024-03-28

## 2024-03-28 RX ADMIN — RUBIDIUM CHLORIDE RB-82 30.6 MILLICURIE: 150 INJECTION, SOLUTION INTRAVENOUS at 09:03

## 2024-03-28 RX ADMIN — RUBIDIUM CHLORIDE RB-82 31.3 MILLICURIE: 150 INJECTION, SOLUTION INTRAVENOUS at 09:03

## 2024-03-28 RX ADMIN — REGADENOSON 0.4 MG: 0.08 INJECTION, SOLUTION INTRAVENOUS at 09:03

## 2024-03-30 ENCOUNTER — NURSE TRIAGE (OUTPATIENT)
Dept: ADMINISTRATIVE | Facility: CLINIC | Age: 89
End: 2024-03-30
Payer: COMMERCIAL

## 2024-03-30 DIAGNOSIS — N30.00 ACUTE CYSTITIS WITHOUT HEMATURIA: Primary | ICD-10-CM

## 2024-03-30 RX ORDER — NITROFURANTOIN 25; 75 MG/1; MG/1
100 CAPSULE ORAL 2 TIMES DAILY
Qty: 10 CAPSULE | Refills: 0 | Status: SHIPPED | OUTPATIENT
Start: 2024-03-30 | End: 2024-04-04

## 2024-03-30 NOTE — TELEPHONE ENCOUNTER
Patient states she has received the results of her urinalysis on today, 3/30/24, that indicates she has a UTI. Patient calling to request an antibiotic for treatment of a UTI. Patient states her labs were ordered by her Urologist, Dr. Patel Kumar.    On Call Provider, Dr. Bhaskar Rowland, advises that he will send in a prescription for Macrobid to patient's Pharmacy today, 3/30/24.    Patient advised that a prescription for Macrobid will be submitted to her Pharmacy today for . Patient states understanding of care advice.     Reason for Disposition   [1] POSITIVE urine test (i.e., NI + or LE + or WBC > 10) AND [2] NO standing order to call in prescription for antibiotic    Additional Information   Negative: [1] Diagnosed urinary tract infection AND [2] female taking antibiotic   Negative: [1] Diagnosed urinary tract infection AND [2] male taking antibiotic   Negative: Patient sounds very sick or weak to the triager   Negative: [1] POSITIVE urine test (i.e., NI + or LE+ or WBC > 10) AND [2] fever > 100.4 F (38.0 C)   Negative: [1] POSITIVE urine test AND [2] side (flank) or lower back pain present   Negative: [1] POSITIVE urine test AND [2] pregnant   Negative: [1] NEGATIVE urine test (i.e., NI - and LE - and WBC < 10) AND [2] urine symptoms continue or are  worsening    Protocols used: Urinalysis Results Follow-up Call-A-

## 2024-03-30 NOTE — PROGRESS NOTES
Patient called nursing after hours as her urine culture from clinic resulted with E Coli. Instructed call line to inform patient that I will send her Nitrofurantoin to [facility] in [place].    Patient may call back with any further questions or concerns.

## 2024-04-08 ENCOUNTER — TELEPHONE (OUTPATIENT)
Dept: UROLOGY | Facility: CLINIC | Age: 89
End: 2024-04-08
Payer: COMMERCIAL

## 2024-04-08 NOTE — TELEPHONE ENCOUNTER
Dr. Rowland sent Macrobid for her UTI.   s/p L3-S1 laminectomy with L4-L5 fusion on 1/14. Continue with post-op management per ortho including:  -Pain control  -Bowel regimen  -Incentive spirometer  -OOB and ambulate   -DVT ppx

## 2024-04-10 ENCOUNTER — HOSPITAL ENCOUNTER (OUTPATIENT)
Dept: RADIOLOGY | Facility: HOSPITAL | Age: 89
Discharge: HOME | End: 2024-04-10
Attending: STUDENT IN AN ORGANIZED HEALTH CARE EDUCATION/TRAINING PROGRAM
Payer: COMMERCIAL

## 2024-04-10 DIAGNOSIS — R92.8 ABNORMAL MAMMOGRAM: ICD-10-CM

## 2024-04-10 PROCEDURE — 77061 BREAST TOMOSYNTHESIS UNI: CPT | Mod: 26,LT,, | Performed by: RADIOLOGY

## 2024-04-10 PROCEDURE — 77061 BREAST TOMOSYNTHESIS UNI: CPT | Mod: TC,LT

## 2024-04-10 PROCEDURE — 77065 DX MAMMO INCL CAD UNI: CPT | Mod: TC,LT

## 2024-04-10 PROCEDURE — 77065 DX MAMMO INCL CAD UNI: CPT | Mod: 26,LT,, | Performed by: RADIOLOGY

## 2024-04-16 ENCOUNTER — PATIENT MESSAGE (OUTPATIENT)
Dept: PRIMARY CARE CLINIC | Facility: CLINIC | Age: 89
End: 2024-04-16
Payer: COMMERCIAL

## 2024-04-22 ENCOUNTER — LAB VISIT (OUTPATIENT)
Dept: LAB | Facility: HOSPITAL | Age: 89
End: 2024-04-22
Attending: INTERNAL MEDICINE
Payer: COMMERCIAL

## 2024-04-22 DIAGNOSIS — I50.33 ACUTE ON CHRONIC DIASTOLIC CONGESTIVE HEART FAILURE: ICD-10-CM

## 2024-04-22 LAB
ANION GAP SERPL CALC-SCNC: 11 MMOL/L (ref 8–16)
BUN SERPL-MCNC: 31 MG/DL (ref 8–23)
CALCIUM SERPL-MCNC: 10 MG/DL (ref 8.7–10.5)
CHLORIDE SERPL-SCNC: 106 MMOL/L (ref 95–110)
CO2 SERPL-SCNC: 23 MMOL/L (ref 23–29)
CREAT SERPL-MCNC: 1.2 MG/DL (ref 0.5–1.4)
EST. GFR  (NO RACE VARIABLE): 44.1 ML/MIN/1.73 M^2
GLUCOSE SERPL-MCNC: 104 MG/DL (ref 70–110)
POTASSIUM SERPL-SCNC: 4.5 MMOL/L (ref 3.5–5.1)
SODIUM SERPL-SCNC: 140 MMOL/L (ref 136–145)

## 2024-04-22 PROCEDURE — 80048 BASIC METABOLIC PNL TOTAL CA: CPT | Performed by: INTERNAL MEDICINE

## 2024-04-22 PROCEDURE — 36415 COLL VENOUS BLD VENIPUNCTURE: CPT | Performed by: INTERNAL MEDICINE

## 2024-05-08 DIAGNOSIS — N81.9 MIXED URINARY INCONTINENCE DUE TO FEMALE GENITAL PROLAPSE: ICD-10-CM

## 2024-05-08 DIAGNOSIS — N39.46 MIXED URINARY INCONTINENCE DUE TO FEMALE GENITAL PROLAPSE: ICD-10-CM

## 2024-05-09 RX ORDER — OXYBUTYNIN CHLORIDE 10 MG/1
10 TABLET, EXTENDED RELEASE ORAL
Qty: 30 TABLET | Refills: 3 | Status: SHIPPED | OUTPATIENT
Start: 2024-05-09

## 2024-06-24 ENCOUNTER — PATIENT MESSAGE (OUTPATIENT)
Dept: PRIMARY CARE CLINIC | Facility: CLINIC | Age: 89
End: 2024-06-24
Payer: COMMERCIAL

## 2024-07-25 ENCOUNTER — OFFICE VISIT (OUTPATIENT)
Dept: GASTROENTEROLOGY | Facility: CLINIC | Age: 89
End: 2024-07-25
Payer: COMMERCIAL

## 2024-07-25 VITALS
DIASTOLIC BLOOD PRESSURE: 65 MMHG | BODY MASS INDEX: 34.29 KG/M2 | SYSTOLIC BLOOD PRESSURE: 113 MMHG | HEIGHT: 67 IN | WEIGHT: 218.5 LBS | HEART RATE: 89 BPM

## 2024-07-25 DIAGNOSIS — K59.04 CHRONIC IDIOPATHIC CONSTIPATION: Primary | ICD-10-CM

## 2024-07-25 DIAGNOSIS — Z86.010 HISTORY OF COLON POLYPS: ICD-10-CM

## 2024-07-25 PROCEDURE — 1159F MED LIST DOCD IN RCRD: CPT | Mod: CPTII,S$GLB,,

## 2024-07-25 PROCEDURE — 1157F ADVNC CARE PLAN IN RCRD: CPT | Mod: CPTII,S$GLB,,

## 2024-07-25 PROCEDURE — 3288F FALL RISK ASSESSMENT DOCD: CPT | Mod: CPTII,S$GLB,,

## 2024-07-25 PROCEDURE — 1160F RVW MEDS BY RX/DR IN RCRD: CPT | Mod: CPTII,S$GLB,,

## 2024-07-25 PROCEDURE — 1101F PT FALLS ASSESS-DOCD LE1/YR: CPT | Mod: CPTII,S$GLB,,

## 2024-07-25 PROCEDURE — 99204 OFFICE O/P NEW MOD 45 MIN: CPT | Mod: S$GLB,,,

## 2024-07-25 PROCEDURE — 99999 PR PBB SHADOW E&M-EST. PATIENT-LVL V: CPT | Mod: PBBFAC,,,

## 2024-07-25 PROCEDURE — 1125F AMNT PAIN NOTED PAIN PRSNT: CPT | Mod: CPTII,S$GLB,,

## 2024-07-25 RX ORDER — PREDNISONE 20 MG/1
20 TABLET ORAL
COMMUNITY
Start: 2024-06-10

## 2024-07-25 RX ORDER — MUPIROCIN 20 MG/G
OINTMENT TOPICAL
COMMUNITY
Start: 2024-07-16

## 2024-07-25 RX ORDER — TRIAMCINOLONE ACETONIDE 1 MG/G
CREAM TOPICAL
COMMUNITY
Start: 2024-07-16

## 2024-07-25 NOTE — PROGRESS NOTES
"GENERAL GI PATIENT INTAKE:    COVID symptoms in the last 7 days (runny nose, sore throat, congestion, cough, fever): No  PCP: Manju Hawthorne  If not PCP-  number given to establish [phone]: No    ALLERGIES REVIEWED:  Yes    CHIEF COMPLAINT:    Chief Complaint   Patient presents with    Constipation       VITAL SIGNS:  Ht 5' 7" (1.702 m)   Wt 99.1 kg (218 lb 7.6 oz)   BMI 34.22 kg/m²      Change in medical, surgical, family or social history: No      REVIEWED MEDICATION LIST RECONCILED INCLUDING ABOVE MEDS:  Yes     "

## 2024-07-25 NOTE — PROGRESS NOTES
Gastroenterology Clinic Consultation Note    Reason for Visit:  The primary encounter diagnosis was Chronic idiopathic constipation. A diagnosis of History of colon polyps was also pertinent to this visit.    PCP:   Manju Hawthorne   No address on file      Initial HPI   This is a 88 y.o. female presenting for chronic constipation   Patient reports she has suffered with constipation issues for awhile now but in the past month here BM's have gotten less frequent and she does not feel completely done after the BM. She reports the consistency of the bowel movements are very pasty in nature and require her to wipe more causing discomfort. She reports she tries her best to eat roughage in her diet to assist with BM. She drinks approximately 3-4 glasses of water per day and due to immobility issues and using a walker, she is not very active. She has tried Miralax, Mag citrate, castor oil recently to have more regular bowel movements but they have no relieved her constipation making her fearful for an obstruction. Denies unintentional weight loss, fever, chills, nausea, vomiting, diarrhea, esophageal reflux, regurgitation, hematemesis, difficulties swallowing, changes in bowel habits, changes in stool caliber, blood in stool, and abdominal pain. Her last colonoscopy was in 2016 showed one polyp, no further screening needed due to advanced age.     ROS:  Review of Systems   Constitutional:  Negative for chills, fever, malaise/fatigue and weight loss.   Respiratory:  Negative for shortness of breath.    Cardiovascular:  Negative for chest pain.   Gastrointestinal:  Positive for constipation. Negative for abdominal pain, blood in stool, diarrhea, heartburn, melena, nausea and vomiting.   Musculoskeletal:  Positive for joint pain and myalgias.   Neurological:  Negative for dizziness and weakness.      Medical History:  has a past medical history of Breast cyst, Cataract, Chronic back pain, Hepatic steatosis (10/19/2016),  Hyperlipidemia LDL goal <130, Hypertension, Hypothyroidism, OP (osteoporosis), Smoker, Stress incontinence, and Urinary tract infection.    Surgical History:  has a past surgical history that includes Bladder surgery; Cystoscopy; Hysterectomy; Oophorectomy; Cataract extraction; Foot surgery (Right, 11/04/2015); Colonoscopy (N/A, 05/19/2016); Breast cyst aspiration; Cystoscopy with injection of periurethral bulking agent (N/A, 01/18/2023); Eye surgery; Tonsillectomy (1943); and Cosmetic surgery (1920).    Family History: family history includes Alcohol abuse in her father; Breast cancer in her daughter and daughter; Cancer in her brother, sister, and sister; Cataracts in her brother, father, maternal grandmother, mother, sister, and sister; Hearing loss in her sister; Heart attack in her father and paternal grandfather; Heart disease in her father and paternal grandfather; Heart failure in her father and paternal grandfather; Hypertension in her father; Stroke in her father..       Review of patient's allergies indicates:   Allergen Reactions    Lisinopril Other (See Comments)     cough    Sulfur      Other reaction(s): Urticaria    Sulfa (sulfonamide antibiotics) Rash       Current Outpatient Medications on File Prior to Visit   Medication Sig Dispense Refill    amLODIPine (NORVASC) 10 MG tablet Take 1 tablet (10 mg total) by mouth once daily. 90 tablet 3    aspirin (ECOTRIN) 81 MG EC tablet Take 81 mg by mouth once daily.      atorvastatin (LIPITOR) 40 MG tablet Take 1 tablet (40 mg total) by mouth once daily. 90 tablet 3    buPROPion (WELLBUTRIN XL) 150 MG TB24 tablet Take 1 tablet (150 mg total) by mouth once daily. 90 tablet 3    CA CITRATE/MGOX/VIT D3/B6/MIN (CITRACAL PLUS ORAL) Take 1,200 mg by mouth once daily.      dapagliflozin propanediol (FARXIGA) 10 mg tablet Take 1 tablet (10 mg total) by mouth once daily. 30 tablet 11    diclofenac sodium (SOLARAZE) 3 % gel Apply to affected area 4-5 times daily prn  "pain 100 g 3    donepeziL (ARICEPT) 5 MG tablet Take 1 tablet (5 mg total) by mouth every evening. 90 tablet 3    EScitalopram oxalate (LEXAPRO) 20 MG tablet Take 1 tablet (20 mg total) by mouth once daily. 90 tablet 3    fenofibrate 160 MG Tab Take 1 tablet (160 mg total) by mouth once daily. 90 tablet 0    hydroCHLOROthiazide (HYDRODIURIL) 25 MG tablet Take 0.5 tablets (12.5 mg total) by mouth once daily. 90 tablet 1    hydrOXYzine HCL (ATARAX) 25 MG tablet Take 1 tablet (25 mg total) by mouth 3 (three) times daily as needed for Itching. 60 tablet 3    imipramine (TOFRANIL) 25 MG tablet Take 1 tablet (25 mg total) by mouth 3 (three) times daily. 270 tablet 0    levothyroxine (SYNTHROID, LEVOTHROID) 175 MCG tablet Take 1 tablet (175 mcg total) by mouth once daily. 90 tablet 3    multivitamin capsule Take 1 capsule by mouth once daily.      mupirocin (BACTROBAN) 2 % ointment Apply topically.      oxybutynin (DITROPAN-XL) 10 MG 24 hr tablet TAKE 1 TABLET(10 MG) BY MOUTH EVERY DAY 30 tablet 3    pantoprazole (PROTONIX) 40 MG tablet TAKE 1 TABLET(40 MG) BY MOUTH EVERY DAY 90 tablet 0    predniSONE (DELTASONE) 20 MG tablet 20 mg.      triamcinolone acetonide 0.1% (KENALOG) 0.1 % cream Apply topically.      valsartan (DIOVAN) 320 MG tablet Take 1 tablet (320 mg total) by mouth once daily. 90 tablet 3    vibegron (GEMTESA) 75 mg Tab Take 1 tablet (75 mg total) by mouth once daily. 30 tablet 11    alendronate (FOSAMAX) 70 MG tablet Take 1 tablet (70 mg total) by mouth every 7 days. 4 tablet 11     No current facility-administered medications on file prior to visit.     Objective Findings:    Vital Signs:  /65   Pulse 89   Ht 5' 7" (1.702 m)   Wt 99.1 kg (218 lb 7.6 oz)   BMI 34.22 kg/m²   Body mass index is 34.22 kg/m².    Physical Exam:  Physical Exam  Vitals and nursing note reviewed.   Constitutional:       General: She is not in acute distress.     Appearance: Normal appearance. She is not ill-appearing. "   HENT:      Head: Normocephalic and atraumatic.      Right Ear: External ear normal.      Left Ear: External ear normal.      Nose: Nose normal.   Eyes:      General: No scleral icterus.     Extraocular Movements: Extraocular movements intact.   Cardiovascular:      Rate and Rhythm: Normal rate.   Pulmonary:      Effort: Pulmonary effort is normal. No respiratory distress.   Abdominal:      General: Bowel sounds are normal. There is no distension.      Palpations: Abdomen is soft.      Tenderness: There is no guarding.   Musculoskeletal:         General: Normal range of motion.      Cervical back: Normal range of motion.      Comments: Walks with walker    Skin:     General: Skin is warm.   Neurological:      Mental Status: She is alert and oriented to person, place, and time.   Psychiatric:         Mood and Affect: Mood normal.         Behavior: Behavior is cooperative.         Thought Content: Thought content normal.         Labs:  Lab Results   Component Value Date    WBC 6.81 03/12/2024    HGB 13.3 03/12/2024    HCT 42.8 03/12/2024     03/12/2024    CHOL 177 07/31/2023    TRIG 133 07/31/2023    HDL 49 07/31/2023    ALKPHOS 103 03/12/2024    ALT 35 03/12/2024    AST 30 03/12/2024     04/22/2024    K 4.5 04/22/2024     04/22/2024    CREATININE 1.2 04/22/2024    BUN 31 (H) 04/22/2024    CO2 23 04/22/2024    TSH 0.647 07/31/2023    INR 0.9 05/31/2016    HGBA1C 5.2 07/31/2023       Imaging reviewed: NA      Endoscopy reviewed:   Colonoscopy 05/19/2016  Findings:       The perianal exam findings include non-thrombosed external        hemorrhoids and skin tags.        The colon (entire examined portion) was moderately tortuous.        The terminal ileum appeared normal.        Scattered small and large-mouthed diverticula were found in the        sigmoid colon. The sigmoid colon was narrowed throughout with areas        of possible stenosis.        A 3 mm polyp was found in the rectum. The polyp was  sessile. The        polyp was removed with a cold biopsy forceps. Resection and        retrieval were complete.        A diffuse area of moderately erythematous and        vascular-pattern-decreased mucosa was found in the entire colon.        Multiple random biopsies were obtained in the rectum, in the sigmoid        colon, in the descending colon, in the transverse colon, in the        ascending colon and in the cecum with cold forceps for histology.   Impression:           - Non-thrombosed external hemorrhoids and perianal                         skin tags found on perianal exam.                         - Tortuous colon.                         - The examined portion of the ileum was normal.                         - Diverticulosis in the sigmoid colon.                         - Generalized narrowing of the sigmoid colon.                         - One 3 mm polyp in the rectum. Resected and                         retrieved.                         - Erythematous and vascular-pattern-decreased                         mucosa in the entire examined colon.                         - Multiple random biopsies were obtained in the                         rectum, in the sigmoid colon, in the descending                         colon, in the transverse colon, in the ascending                         colon and in the cecum.                         - Overall, very difficult procedure requiring three                         different endoscopes for completion and extended                         procedure time.   Recommendation:       - Discharge patient to home.                         - Patient has a contact number available for                         emergencies. The signs and symptoms of potential                         delayed complications were discussed with the                         patient. Return to normal activities tomorrow.                         Written discharge instructions were provided to the              "            patient.                         - Resume previous diet.                         - Continue present medications.                         - Await pathology results.                         - Repeat colonoscopy is recommended. The                         colonoscopy date will be determined after pathology                         results from today's exam become available for                         review.                         - Telephone endoscopist for pathology results in 1                         week.   Attending Participation:        I personally performed the entire procedure.   Wander Mahajan MD   5/19/2016 2:17:22 PM       Assessment:  1. Chronic idiopathic constipation    2. History of colon polyps      Orders Placed This Encounter    linaCLOtide (LINZESS) 145 mcg Cap capsule       Plan Constipation:   Start daily fiber. Take 1 tsp of fiber powder (psyllium or other sugar-free powder).  Mix in 8 oz of water.  Take x 3-5 days.  Then, increase fiber by 1 tsp every 3-5 days until stool is easy to pass.  Stop and continue at that dose.   Do not exceed 6 tsps/day. GOAL:  More well-formed stool (one continuous well-formed piece vs. Pellets) and minimize straining with initiation. Can cause increased gas.   Start miralax daily (17g per dose). Start at once per day and can titrate up to twice daily. Decrease and/or stop Miralax if stools become softer/liquid in consistency.   Increase water intake to 8-10 glasses of water per day  Stay active. 30-45 minutes of moderate activity 3-4 times per week. (Mobility=motility)   Start Linzess daily - It is common for Linzess to cause diarrhea the first 7-10 days after starting it.  Linzess is always more effective the first 7-10 days of use, and it then "wears off" afterward, meaning that the diarrhea improves.  The goal is 1 BM every day, or every other day, without "accidents" or urgency.  It is ok if the BM are still loose or watery as long as it is not " more than once per day, with accidents, or urgent       Thank you for allowing me to participate in this patient's care.    Sincerely,     TELLY MORALES-C  Gastroenterology Department  [facility] [address] Office [phone]

## 2024-07-25 NOTE — PATIENT INSTRUCTIONS
Treatment for Constipation    How can I treat my constipation?  You can most often treat your constipation at home by doing the following:    Change what you eat and drink  Changing what you eat and drink may make your stools softer and easier to pass. To help relieve your symptoms  eat more high-fiber foods  drink plenty of water and other liquids if you eat more fiber or take a fiber supplement    Read about what you should eat and drink to help relieve constipation. Depending on your age and sex, adults should get 22 to 34 grams of fiber a day.    Get regular physical activity  Getting regular physical activity may help relieve your symptoms.    Try bowel training  Your doctor may suggest that you try to train yourself to have a bowel movement at the same time each day to help you become more regular. For example, trying to have a bowel movement 15 to 45 minutes after breakfast may help, because eating helps your colon move stool.  Make sure you give yourself enough time to have a bowel movement, and use the bathroom as soon as you feel the need to go. Try to relax your muscles or put your feet on a footstool to make yourself more comfortable.    Stop taking certain medicines or dietary supplements  If you think certain medicines or dietary supplements are causing your constipation, talk with your doctor. He or she may change the dose or suggest a different medicine that does not cause constipation. Dont change or stop any medicine or supplement without talking with a health care professional.    Take over-the-counter medicines  Your health care professional may recommend using a laxative for a short time. He or she will tell you what type of laxative is best for you  fiber supplements (Citrucel, FiberCon, Metamucil)  osmotic agents (Milk of Magnesia, Miralax)  stool softeners  (Colace, Docusate)  lubricants, such as mineral oil (Fleet)  stimulants (Correctol, Dulcolax)    You should only use stimulants if your  constipation is severe or other treatments have not worked.  If youve been taking laxatives for a long time and cant have a bowel movement without taking a laxative, talk with your doctor about how you can slowly stop using them. If you stop taking laxatives, over time, your colon should start moving stool normally.    How do doctors treat constipation?  If self-care treatments dont work, your doctor may prescribe a medicine to treat your constipation. If youre taking an over-the-counter or prescription medicine or supplement that can cause constipation, your doctor may suggest you stop taking it, change the dose, or switch to a different one. Talk with your doctor before changing or stopping any medicines.    Prescription medicines  Your doctor may prescribe one of the following medicines for constipation  Lubiprostone -- a medicine prescribed to increase fluid in your digestive tract, which can help reduce pain in your abdomen, make your stool softer, and increase how often you have bowel movements  linaclotide  or plecanatide -- medicines that help make your bowel movements regular if you have irritable bowel syndrome with constipation or long-lasting constipation without a known cause  Prucalopride -- a medicine that helps your colon move stool if you have long-lasting constipation without a known cause    How can I prevent constipation?  You can help prevent constipation by doing some of the same things that treat constipation:    get enough fiber in your diet  drink plenty of water and other liquids  get regular physical activity  try to have a bowel movement at the same time every day

## 2024-07-26 ENCOUNTER — PATIENT MESSAGE (OUTPATIENT)
Dept: GASTROENTEROLOGY | Facility: CLINIC | Age: 89
End: 2024-07-26
Payer: COMMERCIAL

## 2024-07-26 DIAGNOSIS — G47.00 INSOMNIA, UNSPECIFIED TYPE: ICD-10-CM

## 2024-07-26 NOTE — TELEPHONE ENCOUNTER
No care due was identified.  [facility] Embedded Care Due Messages. Reference number: [id number].   7/26/2024 5:08:09 PM CDT

## 2024-07-29 ENCOUNTER — HOSPITAL ENCOUNTER (OUTPATIENT)
Dept: RADIOLOGY | Facility: HOSPITAL | Age: 89
Discharge: HOME | End: 2024-07-29
Attending: STUDENT IN AN ORGANIZED HEALTH CARE EDUCATION/TRAINING PROGRAM
Payer: COMMERCIAL

## 2024-07-29 ENCOUNTER — OFFICE VISIT (OUTPATIENT)
Dept: PRIMARY CARE CLINIC | Facility: CLINIC | Age: 89
End: 2024-07-29
Payer: COMMERCIAL

## 2024-07-29 VITALS
BODY MASS INDEX: 33.94 KG/M2 | WEIGHT: 216.69 LBS | HEART RATE: 85 BPM | SYSTOLIC BLOOD PRESSURE: 128 MMHG | DIASTOLIC BLOOD PRESSURE: 70 MMHG | OXYGEN SATURATION: 94 %

## 2024-07-29 DIAGNOSIS — Z79.899 OTHER LONG TERM (CURRENT) DRUG THERAPY: ICD-10-CM

## 2024-07-29 DIAGNOSIS — I15.2 HYPERTENSION DUE TO ENDOCRINE DISORDER: ICD-10-CM

## 2024-07-29 DIAGNOSIS — Z00.00 ANNUAL PHYSICAL EXAM: Primary | ICD-10-CM

## 2024-07-29 DIAGNOSIS — J06.9 VIRAL URI WITH COUGH: ICD-10-CM

## 2024-07-29 DIAGNOSIS — J30.9 NASAL SINUS INFLAMMATION DUE TO ALLERGY: ICD-10-CM

## 2024-07-29 DIAGNOSIS — E78.2 MIXED HYPERLIPIDEMIA: ICD-10-CM

## 2024-07-29 PROCEDURE — 1157F ADVNC CARE PLAN IN RCRD: CPT | Mod: CPTII,S$GLB,, | Performed by: STUDENT IN AN ORGANIZED HEALTH CARE EDUCATION/TRAINING PROGRAM

## 2024-07-29 PROCEDURE — 99214 OFFICE O/P EST MOD 30 MIN: CPT | Mod: 25,S$GLB,, | Performed by: STUDENT IN AN ORGANIZED HEALTH CARE EDUCATION/TRAINING PROGRAM

## 2024-07-29 PROCEDURE — 99397 PER PM REEVAL EST PAT 65+ YR: CPT | Mod: S$GLB,,, | Performed by: STUDENT IN AN ORGANIZED HEALTH CARE EDUCATION/TRAINING PROGRAM

## 2024-07-29 PROCEDURE — 1160F RVW MEDS BY RX/DR IN RCRD: CPT | Mod: CPTII,S$GLB,, | Performed by: STUDENT IN AN ORGANIZED HEALTH CARE EDUCATION/TRAINING PROGRAM

## 2024-07-29 PROCEDURE — 1126F AMNT PAIN NOTED NONE PRSNT: CPT | Mod: CPTII,S$GLB,, | Performed by: STUDENT IN AN ORGANIZED HEALTH CARE EDUCATION/TRAINING PROGRAM

## 2024-07-29 PROCEDURE — 71046 X-RAY EXAM CHEST 2 VIEWS: CPT | Mod: TC

## 2024-07-29 PROCEDURE — 99999 PR PBB SHADOW E&M-EST. PATIENT-LVL V: CPT | Mod: PBBFAC,,, | Performed by: STUDENT IN AN ORGANIZED HEALTH CARE EDUCATION/TRAINING PROGRAM

## 2024-07-29 PROCEDURE — 71046 X-RAY EXAM CHEST 2 VIEWS: CPT | Mod: 26,,, | Performed by: RADIOLOGY

## 2024-07-29 PROCEDURE — 1159F MED LIST DOCD IN RCRD: CPT | Mod: CPTII,S$GLB,, | Performed by: STUDENT IN AN ORGANIZED HEALTH CARE EDUCATION/TRAINING PROGRAM

## 2024-07-29 RX ORDER — ALBUTEROL SULFATE 90 UG/1
2 INHALANT RESPIRATORY (INHALATION) EVERY 6 HOURS PRN
Qty: 18 G | Refills: 1 | Status: SHIPPED | OUTPATIENT
Start: 2024-07-29 | End: 2024-09-19

## 2024-07-29 RX ORDER — TEMAZEPAM 30 MG/1
30 CAPSULE ORAL NIGHTLY PRN
Qty: 90 CAPSULE | Refills: 0 | Status: SHIPPED | OUTPATIENT
Start: 2024-07-29

## 2024-07-29 RX ORDER — FLUTICASONE PROPIONATE 50 MCG
2 SPRAY, SUSPENSION (ML) NASAL 2 TIMES DAILY
Qty: 16 G | Refills: 1 | Status: SHIPPED | OUTPATIENT
Start: 2024-07-29 | End: 2024-09-16

## 2024-07-29 NOTE — PROGRESS NOTES
Subjective:     Chief Complaint: Annual Exam       Patient ID: Harriet Rutherford is a 89 y.o. female       Chronic medical issues:    GERD worse over the last few months. Has taken omeprazole recently with improvement. No dysphagia. No weight loss.     Hypothyroidism:  currently on 200 mcg synthroid     Depression: she is taking lexapro 20 and wellbutrin 150 mg. Feels much better.      HTN:  Amlodipine 10 mg, hydrochlorothiazide 25 mg stable; occasionally experiences shortness a breath with long exertion.  no chest pain      HLD: Stable on statin     Stress incontinence: Follows with Dr. Kumar     PAD:  Leg swelling: improved; none currently; eliminated all salt from her diet     Hypothyroidism:  On Synthroid 175 mcg every morning.  Tolerating medication well.  Denies symptoms of Fatigue, Cold intolerance, Weight gain, Constipation, Dry skin, Myalgia  Due for TSH recheck.    CKD3: Most recent GFR 44.  Improved from previous check.     Memory disorder:  Donepezil 5 mg nightly.    Separate EM concern:  Patient endorses sinus congestion, cough, and postnasal drip over the past several days.  No fevers.  Not taking anything over-the-counter.  No chest pain or shortness of breath.      Past Medical History:  Past Medical History:   Diagnosis Date    Breast cyst     Cataract     Chronic back pain     Hepatic steatosis 10/19/2016    Hyperlipidemia LDL goal <130     Hypertension     Hypothyroidism     OP (osteoporosis)     Smoker     Stress incontinence     Urinary tract infection        Home Medications:  Prior to Admission medications    Medication Sig Start Date End Date Taking? Authorizing Provider   alendronate (FOSAMAX) 70 MG tablet Take 1 tablet (70 mg total) by mouth every 7 days. 11/14/22 11/14/23 Yes Arpita Guevara,    amLODIPine (NORVASC) 10 MG tablet TAKE ONE TABLET BY MOUTH EVERY DAY 5/19/23  Yes Jael Villanueva NP   aspirin (ECOTRIN) 81 MG EC tablet Take 81 mg by mouth once daily.   Yes Historical Provider    atorvastatin (LIPITOR) 40 MG tablet Take 1 tablet (40 mg total) by mouth once daily. 10/22/22  Yes Arpita Guevara DO   buPROPion (WELLBUTRIN XL) 150 MG TB24 tablet Take 1 tablet (150 mg total) by mouth once daily. 10/23/22  Yes Arpita Guevara DO   CA CITRATE/MGOX/VIT D3/B6/MIN (CITRACAL PLUS ORAL) Take 1,200 mg by mouth once daily.   Yes Historical Provider   diclofenac sodium (SOLARAZE) 3 % gel Apply to affected area 4-5 times daily prn pain 4/28/21  Yes Arpita Gueavra DO   donepeziL (ARICEPT) 5 MG tablet Take 1 tablet (5 mg total) by mouth every evening. 9/27/22 9/27/23 Yes Arpita Guevara DO   EScitalopram oxalate (LEXAPRO) 20 MG tablet TAKE ONE TABLET BY MOUTH EVERY DAY. 3/20/23  Yes Arpita Guevara DO   fenofibrate 160 MG Tab TAKE ONE TABLET BY MOUTH EVERY DAY. 3/20/23  Yes Arpita Guevara DO   hydroCHLOROthiazide (HYDRODIURIL) 25 MG tablet Take 0.5 tablets (12.5 mg total) by mouth once daily. 10/4/22  Yes Arpita Guevara DO   hydrOXYzine HCl (ATARAX) 25 MG tablet Take 1 tablet (25 mg total) by mouth 3 (three) times daily as needed for Itching. 12/5/17  Yes Arpita Guevara DO   imipramine (TOFRANIL) 25 MG tablet Take 1 tablet (25 mg total) by mouth 3 (three) times daily. 11/15/22 11/15/23 Yes Patel Kumar MD   levothyroxine (SYNTHROID, LEVOTHROID) 175 MCG tablet TAKE ONE TABLET BY MOUTH EVERY DAY. 3/20/23  Yes Arpita Guevara DO   multivitamin capsule Take 1 capsule by mouth once daily.   Yes Historical Provider   oxybutynin (DITROPAN-XL) 10 MG 24 hr tablet Take 1 tablet (10 mg total) by mouth once daily. 12/22/22 12/22/23 Yes Patel Kumar MD   temazepam (RESTORIL) 30 mg capsule Take one capsule by mouth every evening 5/23/23  Yes Arpita Guevara DO   valsartan (DIOVAN) 320 MG tablet TAKE ONE TABLET BY MOUTH EVERY DAY. 10/5/22  Yes Arpita Guevara DO   conjugated estrogens (PREMARIN) vaginal cream Place 1 g vaginally every other day. 11/15/22 12/15/22  Patel Kumar MD    pantoprazole (PROTONIX) 40 MG tablet Take 1 tablet (40 mg total) by mouth once daily. 23  Manju Hawthorne MD       Allergies:  Review of patient's allergies indicates:   Allergen Reactions    Lisinopril Other (See Comments)     cough    Sulfur      Other reaction(s): Urticaria    Sulfa (sulfonamide antibiotics) Rash       Social History:  Social History     Tobacco Use    Smoking status: Former     Current packs/day: 0.00     Average packs/day: 1 pack/day for 65.9 years (65.9 ttl pk-yrs)     Types: Cigarettes     Start date: 1950     Quit date: 2016     Years since quittin.1    Smokeless tobacco: Never   Substance Use Topics    Alcohol use: Yes     Alcohol/week: 1.0 standard drink of alcohol     Types: 1 Glasses of wine per week     Comment: RARELY    Drug use: No         Review of Systems   Constitutional:  Negative for diaphoresis, fatigue and fever.   HENT:  Positive for rhinorrhea. Negative for congestion, ear pain, sinus pain, sneezing, sore throat and voice change.    Eyes:  Negative for discharge, redness and itching.   Respiratory:  Positive for cough. Negative for shortness of breath and wheezing.    Cardiovascular:  Negative for chest pain.   Gastrointestinal:  Negative for abdominal pain.   Musculoskeletal:  Negative for gait problem and joint swelling.   Skin:  Negative for color change, pallor, rash and wound.   Neurological:  Negative for weakness.           Objective:   /70 (BP Location: Right arm)   Pulse 85   Wt 98.3 kg (216 lb 11.4 oz)   SpO2 (!) 94%   BMI 33.94 kg/m²        General: AAO x3, no apparent distress  HEENT: PERRL, OP clear  CV: RRR, no m/r/g  Pulm: Lungs CTAB, no crackles, no wheezes  Abd: s/NT/ND +BS  Extremities: no c/c/e    Labs:         Assessment/Plan     1. Annual physical exam    2. Nasal sinus inflammation due to allergy    3. Viral URI with cough  -     Discontinue: fluticasone propionate (FLONASE) 50 mcg/actuation nasal spray; 2  sprays (100 mcg total) by Each Nostril route 2 (two) times a day.  Dispense: 16 g; Refill: 1  -     X-Ray Chest PA And Lateral; Future; Expected date: 07/29/2024  -     albuterol (VENTOLIN HFA) 90 mcg/actuation inhaler; Inhale 2 puffs into the lungs every 6 (six) hours as needed for Wheezing. Rescue  Dispense: 18 g; Refill: 1  - discussed etiology of symptoms and treatment:  Recommend low dose intranasal steroids and Antihistamine, and nasal saline rinse. OTC cough suppressants and lozenges prn. Pt instructed to notify clinic if fever develops, or no improvement.    4. 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 Mixed hyperlipidemia  Stable on medications, continue regimen           Manju Hawthorne MD  Department of Internal Medicine - [facility]

## 2024-08-02 ENCOUNTER — PATIENT MESSAGE (OUTPATIENT)
Dept: PRIMARY CARE CLINIC | Facility: CLINIC | Age: 89
End: 2024-08-02
Payer: COMMERCIAL

## 2024-08-19 ENCOUNTER — PATIENT MESSAGE (OUTPATIENT)
Dept: GASTROENTEROLOGY | Facility: CLINIC | Age: 89
End: 2024-08-19
Payer: COMMERCIAL

## 2024-08-20 NOTE — TELEPHONE ENCOUNTER
No care due was identified.  [facility] Embedded Care Due Messages. Reference number: [id number].   8/20/2024 8:06:47 AM CDT

## 2024-08-21 RX ORDER — LEVOTHYROXINE SODIUM 175 UG/1
175 TABLET ORAL
Qty: 90 TABLET | Refills: 3 | Status: SHIPPED | OUTPATIENT
Start: 2024-08-21

## 2024-08-21 NOTE — TELEPHONE ENCOUNTER
Refill Routing Note   Medication(s) are not appropriate for processing by [facility] for the following reason(s):        Required labs outdated: TSH    [facility] action(s):  Defer               Appointments  past 12m or future 3m with PCP    Date Provider   Last Visit   7/29/2024 Manju Hawthorne MD   Next Visit   Visit date not found Manju Hawthorne MD   ED visits in past 90 days: 0        Note composed:7:55 PM 08/20/2024

## 2024-09-15 DIAGNOSIS — J06.9 VIRAL URI WITH COUGH: ICD-10-CM

## 2024-09-15 NOTE — TELEPHONE ENCOUNTER
Care Due:                  Date            Visit Type   Department     Provider  --------------------------------------------------------------------------------                                EP -                              PRIMARY      [facility] PRIMARY  Last Visit: 07-      CARE ([facility])   ARABELLA Hawthorne                              EP -                              PRIMARY      [facility] PRIMARY  Next Visit: 02-      CARE ([facility])   ARABELLA Hawthorne                                                            Last  Test          Frequency    Reason                     Performed    Due Date  --------------------------------------------------------------------------------    Lipid Panel.  12 months..  atorvastatin, fenofibrate  07- 07-    TSH.........  12 months..  levothyroxine............  07- 07-    [employer] Embedded Care Due Messages. Reference number: [id number].   9/15/2024 8:06:28 AM CDT

## 2024-09-16 RX ORDER — FLUTICASONE PROPIONATE 50 MCG
SPRAY, SUSPENSION (ML) NASAL
Qty: 16 G | Refills: 1 | Status: SHIPPED | OUTPATIENT
Start: 2024-09-16

## 2024-09-16 NOTE — TELEPHONE ENCOUNTER
Refill Routing Note   Medication(s) are not appropriate for processing by [facility] for the following reason(s):        New or recently adjusted medication    [facility] action(s):  Defer     Requires labs : Yes      Medication Therapy Plan: FOVS      Appointments  past 12m or future 3m with PCP    Date Provider   Last Visit   7/29/2024 Manju Hawthorne MD   Next Visit   2/24/2025 Manju Hawthorne MD   ED visits in past 90 days: 0        Note composed:7:23 AM 09/16/2024

## 2024-09-18 DIAGNOSIS — J06.9 VIRAL URI WITH COUGH: ICD-10-CM

## 2024-09-18 NOTE — TELEPHONE ENCOUNTER
No care due was identified.  [facility] Embedded Care Due Messages. Reference number: [id number].   9/18/2024 11:44:04 AM CDT

## 2024-09-19 RX ORDER — ALBUTEROL SULFATE 90 UG/1
2 INHALANT RESPIRATORY (INHALATION) EVERY 6 HOURS PRN
Qty: 8.5 G | Refills: 1 | Status: SHIPPED | OUTPATIENT
Start: 2024-09-19

## 2024-09-19 NOTE — TELEPHONE ENCOUNTER
Refill Routing Note   Medication(s) are not appropriate for processing by [facility] for the following reason(s):        New or recently adjusted medication:     [facility] action(s):  Defer      Medication Therapy Plan:         Appointments  past 12m or future 3m with PCP    Date Provider   Last Visit   7/29/2024 Manju Hawthorne MD   Next Visit   2/24/2025 Manju Hawthorne MD   ED visits in past 90 days: 0        Note composed:10:11 AM 09/19/2024

## 2024-10-13 DIAGNOSIS — E78.5 HYPERLIPIDEMIA, UNSPECIFIED HYPERLIPIDEMIA TYPE: Chronic | ICD-10-CM

## 2024-10-13 DIAGNOSIS — I15.2 HYPERTENSION DUE TO ENDOCRINE DISORDER: Chronic | ICD-10-CM

## 2024-10-13 DIAGNOSIS — E03.9 HYPOTHYROIDISM, UNSPECIFIED TYPE: Chronic | ICD-10-CM

## 2024-10-13 DIAGNOSIS — F32.A ANXIETY AND DEPRESSION: ICD-10-CM

## 2024-10-13 DIAGNOSIS — R06.09 DOE (DYSPNEA ON EXERTION): ICD-10-CM

## 2024-10-13 DIAGNOSIS — F41.9 ANXIETY AND DEPRESSION: ICD-10-CM

## 2024-10-13 NOTE — TELEPHONE ENCOUNTER
No care due was identified.  [facility] Embedded Care Due Messages. Reference number: [id number].   10/13/2024 12:43:59 PM CDT

## 2024-10-14 NOTE — TELEPHONE ENCOUNTER
Refill Routing Note   Medication(s) are not appropriate for processing by [facility] for the following reason(s):        Required labs outdated    [facility] action(s):  Defer             Appointments  past 12m or future 3m with PCP    Date Provider   Last Visit   7/29/2024 Manju Hawthorne MD   Next Visit   2/24/2025 Manju Hawthorne MD   ED visits in past 90 days: 0        Note composed:7:36 PM 10/13/2024

## 2024-10-15 RX ORDER — FENOFIBRATE 160 MG/1
TABLET ORAL
Qty: 90 TABLET | Refills: 1 | Status: SHIPPED | OUTPATIENT
Start: 2024-10-15

## 2024-10-30 DIAGNOSIS — I10 HYPERTENSION, UNSPECIFIED TYPE: Chronic | ICD-10-CM

## 2024-10-30 RX ORDER — VALSARTAN 320 MG/1
320 TABLET ORAL
Qty: 90 TABLET | Refills: 1 | Status: SHIPPED | OUTPATIENT
Start: 2024-10-30

## 2024-10-31 DIAGNOSIS — G47.00 INSOMNIA, UNSPECIFIED TYPE: ICD-10-CM

## 2024-10-31 RX ORDER — TEMAZEPAM 30 MG/1
30 CAPSULE ORAL NIGHTLY PRN
Qty: 90 CAPSULE | Refills: 0 | Status: SHIPPED | OUTPATIENT
Start: 2024-10-31

## 2024-11-07 DIAGNOSIS — E03.9 HYPOTHYROIDISM, UNSPECIFIED TYPE: Chronic | ICD-10-CM

## 2024-11-07 DIAGNOSIS — R06.09 DOE (DYSPNEA ON EXERTION): ICD-10-CM

## 2024-11-07 DIAGNOSIS — F41.9 ANXIETY AND DEPRESSION: ICD-10-CM

## 2024-11-07 DIAGNOSIS — F32.A ANXIETY AND DEPRESSION: ICD-10-CM

## 2024-11-07 DIAGNOSIS — E78.5 HYPERLIPIDEMIA, UNSPECIFIED HYPERLIPIDEMIA TYPE: Chronic | ICD-10-CM

## 2024-11-07 DIAGNOSIS — I15.2 HYPERTENSION DUE TO ENDOCRINE DISORDER: Chronic | ICD-10-CM

## 2024-11-07 RX ORDER — BUPROPION HYDROCHLORIDE 150 MG/1
150 TABLET ORAL DAILY
Qty: 90 TABLET | Refills: 2 | Status: SHIPPED | OUTPATIENT
Start: 2024-11-07

## 2024-11-08 NOTE — TELEPHONE ENCOUNTER
No care due was identified.  [facility] Embedded Care Due Messages. Reference number: [id number].   11/07/2024 7:16:27 PM CST

## 2024-11-08 NOTE — TELEPHONE ENCOUNTER
Refill Decision Note   Harriet Rutherford  is requesting a refill authorization.  Brief Assessment and Rationale for Refill:  Approve     Medication Therapy Plan:         Comments:     Note composed:10:29 PM 11/07/2024

## 2024-11-22 ENCOUNTER — PATIENT MESSAGE (OUTPATIENT)
Dept: ADMINISTRATIVE | Facility: HOSPITAL | Age: 89
End: 2024-11-22
Payer: COMMERCIAL

## 2024-11-25 ENCOUNTER — PATIENT OUTREACH (OUTPATIENT)
Dept: ADMINISTRATIVE | Facility: HOSPITAL | Age: 89
End: 2024-11-25
Payer: COMMERCIAL

## 2024-11-25 DIAGNOSIS — Z78.0 POSTMENOPAUSAL: Primary | ICD-10-CM

## 2024-11-25 NOTE — PROGRESS NOTES
Health Maintenance Due   Topic Date Due    Lipid Panel  07/31/2024    DEXA Scan  11/07/2024     Chart review completed. HM Updated. Triggered Links. Immunizations reviewed and updated. Care Everywhere Updated. Care Team Updated.  Placed DXA order. Outreached patient via portal to assist with scheduling screening.

## 2024-12-20 ENCOUNTER — HOSPITAL ENCOUNTER (OUTPATIENT)
Dept: RADIOLOGY | Facility: CLINIC | Age: 89
Discharge: HOME | End: 2024-12-20
Attending: STUDENT IN AN ORGANIZED HEALTH CARE EDUCATION/TRAINING PROGRAM
Payer: COMMERCIAL

## 2024-12-20 DIAGNOSIS — Z78.0 POSTMENOPAUSAL: ICD-10-CM

## 2024-12-20 PROCEDURE — 77080 DXA BONE DENSITY AXIAL: CPT | Mod: TC

## 2024-12-20 PROCEDURE — 77080 DXA BONE DENSITY AXIAL: CPT | Mod: 26,,, | Performed by: INTERNAL MEDICINE

## 2025-01-15 DIAGNOSIS — E03.9 HYPOTHYROIDISM, UNSPECIFIED TYPE: Chronic | ICD-10-CM

## 2025-01-15 DIAGNOSIS — F41.9 ANXIETY AND DEPRESSION: ICD-10-CM

## 2025-01-15 DIAGNOSIS — F32.A ANXIETY AND DEPRESSION: ICD-10-CM

## 2025-01-15 DIAGNOSIS — I15.2 HYPERTENSION DUE TO ENDOCRINE DISORDER: Chronic | ICD-10-CM

## 2025-01-15 DIAGNOSIS — R06.09 DOE (DYSPNEA ON EXERTION): ICD-10-CM

## 2025-01-15 DIAGNOSIS — E78.5 HYPERLIPIDEMIA, UNSPECIFIED HYPERLIPIDEMIA TYPE: Chronic | ICD-10-CM

## 2025-01-15 RX ORDER — FENOFIBRATE 160 MG/1
TABLET ORAL
Qty: 90 TABLET | Refills: 3 | Status: SHIPPED | OUTPATIENT
Start: 2025-01-15

## 2025-01-15 NOTE — TELEPHONE ENCOUNTER
Refill Routing Note   Medication(s) are not appropriate for processing by [facility] for the following reason(s):        Required labs outdated    [facility] action(s):  Defer             Appointments  past 12m or future 3m with PCP    Date Provider   Last Visit   7/29/2024 Manju Hawthorne MD   Next Visit   2/24/2025 Manju Hawthorne MD   ED visits in past 90 days: 0        Note composed:8:16 AM 01/15/2025

## 2025-01-15 NOTE — TELEPHONE ENCOUNTER
Unable to retrieve patient chart and identify care due.  [facility] Embedded Care Due Messages. Reference number: [id number].   1/15/2025 8:06:04 AM CST

## 2025-02-07 ENCOUNTER — OFFICE VISIT (OUTPATIENT)
Dept: UROLOGY | Facility: CLINIC | Age: 89
End: 2025-02-07
Payer: COMMERCIAL

## 2025-02-07 VITALS
WEIGHT: 210.75 LBS | HEART RATE: 81 BPM | SYSTOLIC BLOOD PRESSURE: 118 MMHG | HEIGHT: 67 IN | DIASTOLIC BLOOD PRESSURE: 57 MMHG | BODY MASS INDEX: 33.08 KG/M2

## 2025-02-07 DIAGNOSIS — N39.46 MIXED URINARY INCONTINENCE DUE TO FEMALE GENITAL PROLAPSE: ICD-10-CM

## 2025-02-07 DIAGNOSIS — N81.9 MIXED URINARY INCONTINENCE DUE TO FEMALE GENITAL PROLAPSE: ICD-10-CM

## 2025-02-07 DIAGNOSIS — N32.81 URGENCY-FREQUENCY SYNDROME: ICD-10-CM

## 2025-02-07 PROCEDURE — 99213 OFFICE O/P EST LOW 20 MIN: CPT | Mod: S$GLB,,, | Performed by: UROLOGY

## 2025-02-07 PROCEDURE — 3288F FALL RISK ASSESSMENT DOCD: CPT | Mod: CPTII,S$GLB,, | Performed by: UROLOGY

## 2025-02-07 PROCEDURE — 99999 PR PBB SHADOW E&M-EST. PATIENT-LVL IV: CPT | Mod: PBBFAC,,, | Performed by: UROLOGY

## 2025-02-07 PROCEDURE — 1157F ADVNC CARE PLAN IN RCRD: CPT | Mod: CPTII,S$GLB,, | Performed by: UROLOGY

## 2025-02-07 PROCEDURE — 1159F MED LIST DOCD IN RCRD: CPT | Mod: CPTII,S$GLB,, | Performed by: UROLOGY

## 2025-02-07 PROCEDURE — 1126F AMNT PAIN NOTED NONE PRSNT: CPT | Mod: CPTII,S$GLB,, | Performed by: UROLOGY

## 2025-02-07 PROCEDURE — 1101F PT FALLS ASSESS-DOCD LE1/YR: CPT | Mod: CPTII,S$GLB,, | Performed by: UROLOGY

## 2025-02-07 RX ORDER — OXYBUTYNIN CHLORIDE 10 MG/1
10 TABLET, EXTENDED RELEASE ORAL DAILY
Qty: 90 TABLET | Refills: 3 | Status: SHIPPED | OUTPATIENT
Start: 2025-02-07 | End: 2026-02-07

## 2025-02-07 RX ORDER — VIBEGRON 75 MG/1
1 TABLET, FILM COATED ORAL DAILY
Qty: 90 TABLET | Refills: 3 | Status: SHIPPED | OUTPATIENT
Start: 2025-02-07 | End: 2026-02-07

## 2025-02-07 NOTE — PROGRESS NOTES
CC: s/p Bulkimed injection for HANSA, hx of mixed urinary incontinence    Harriet Rutherford is a 89 y.o. woman who is here for the evaluation of Follow-up (Pt states she is here for a medication follow up.)  Her PCP, Manju Hawthorne MD     Date: 01/18/2023  Pre-Op Diagnosis: HANSA  Post-Op Diagnosis: same  Procedure(s) Performed:   1.  Cystoscopy with transurethral injection of urethral bulking agent (Bulkamid)  Findings: good coaptation of the urethra    C/o frequency, urgency and urge incontinence.  Uses #4 x 2 pads a day   She is on imipramine TID and oxybutynin xl daily.  She is very pleased with the outcome after urethral bulking injection.    s/p Monarc Sling surgery done by me back in 1/2/08, c/o mixed incontinence. Thus she was started on impramine 25 mg TID and Detrol LA.  With these medications, her urine control is very good.  However, she reported that Detrol LA costs more than $230 a month.  She is interested in alternative medication that is much cheaper than Detrol LA      Urination symptoms: Positive for frequency, urgency, nocturia and mixed urinary incontinence.  Denies flank pain, dysuria, hematuria       Past Medical History:   Diagnosis Date    Breast cyst     Cataract     Chronic back pain     Hepatic steatosis 10/19/2016    Hyperlipidemia LDL goal <130     Hypertension     Hypothyroidism     OP (osteoporosis)     Smoker     Stress incontinence     Urinary tract infection      Past Surgical History:   Procedure Laterality Date    BLADDER SURGERY      lifted     BREAST CYST ASPIRATION      CATARACT EXTRACTION      Both eyes    COLONOSCOPY N/A 05/19/2016    Procedure: COLONOSCOPY;  Surgeon: Wander Mahajan MD;  Location: [address];  Service: Endoscopy;  Laterality: N/A;  Per Dr. Mahajan use Prepopik     COSMETIC SURGERY  1920    CYSTOSCOPY      CYSTOSCOPY WITH INJECTION OF PERIURETHRAL BULKING AGENT N/A 01/18/2023    Procedure: CYSTOSCOPY, WITH PERIURETHRAL BULKING AGENT INJECTION;   Surgeon: Patel Kumar MD;  Location: [address];  Service: Urology;  Laterality: N/A;  30 MIN    EYE SURGERY      FOOT SURGERY Right 2015    OSTEOTOMY-METATARSAL    HYSTERECTOMY      OOPHORECTOMY      TONSILLECTOMY       Social History     Tobacco Use    Smoking status: Former     Current packs/day: 0.00     Average packs/day: 1 pack/day for 65.9 years (65.9 ttl pk-yrs)     Types: Cigarettes     Start date: 1950     Quit date: 2016     Years since quittin.5    Smokeless tobacco: Never   Substance Use Topics    Alcohol use: Yes     Alcohol/week: 1.0 standard drink of alcohol     Types: 1 Glasses of wine per week     Comment: RARELY    Drug use: No     Family History   Problem Relation Name Age of Onset    Cataracts Mother      Cataracts Father Ed Anders     Hypertension Father Ed Anders     Stroke Father Ed Anders     Heart attack Father Ed Anders     Heart disease Father Ed Anders     Heart failure Father Ed Anders     Alcohol abuse Father Ed Anders     Cataracts Sister 2     Cataracts Sister Evelin Flores     Cancer Sister Evelin Flores          throid cancer    Cancer Sister Aldo Anders         Deveased. Multiple mylomz    Hearing loss Sister Annalee Anders Christine     Cancer Brother 2     Cataracts Brother 2     Cataracts Maternal Grandmother      Heart attack Paternal Grandfather Benedicto Gibsonherty     Heart disease Paternal Grandfather Benedicto Anders     Heart failure Paternal Grandfather Benedicto Anders     Breast cancer Daughter Eva Rutherford Evaristo.     Breast cancer Daughter Danni Postwillie     Amblyopia Neg Hx      Blindness Neg Hx      Glaucoma Neg Hx      Macular degeneration Neg Hx      Strabismus Neg Hx      Retinal detachment Neg Hx      Anesthesia problems Neg Hx      Colon cancer Neg Hx      Esophageal cancer Neg Hx       Allergy:  Review of patient's allergies indicates:   Allergen Reactions    Lisinopril Other (See  Comments)     cough    Sulfur      Other reaction(s): Urticaria    Sulfa (sulfonamide antibiotics) Rash     Outpatient Encounter Medications as of 2/7/2025   Medication Sig Dispense Refill    albuterol (PROVENTIL/VENTOLIN HFA) 90 mcg/actuation inhaler Inhale 2 puffs into the lungs every 6 (six) hours as needed for Wheezing. 8.5 g 1    amLODIPine (NORVASC) 10 MG tablet Take 1 tablet (10 mg total) by mouth once daily. 90 tablet 3    aspirin (ECOTRIN) 81 MG EC tablet Take 81 mg by mouth once daily.      atorvastatin (LIPITOR) 40 MG tablet Take 1 tablet (40 mg total) by mouth once daily. 90 tablet 3    buPROPion (WELLBUTRIN XL) 150 MG TB24 tablet Take 1 tablet (150 mg total) by mouth once daily. 90 tablet 2    CA CITRATE/MGOX/VIT D3/B6/MIN (CITRACAL PLUS ORAL) Take 1,200 mg by mouth once daily.      dapagliflozin propanediol (FARXIGA) 10 mg tablet Take 1 tablet (10 mg total) by mouth once daily. 30 tablet 11    diclofenac sodium (SOLARAZE) 3 % gel Apply to affected area 4-5 times daily prn pain 100 g 3    EScitalopram oxalate (LEXAPRO) 20 MG tablet Take 1 tablet (20 mg total) by mouth once daily. 90 tablet 3    fenofibrate 160 MG Tab TAKE 1 TABLET(160 MG) BY MOUTH DAILY 90 tablet 3    fluticasone propionate (FLONASE) 50 mcg/actuation nasal spray SHAKE LIQUID AND USE 2 SPRAYS(100 MCG) IN EACH NOSTRIL TWICE DAILY 16 g 1    hydroCHLOROthiazide (HYDRODIURIL) 25 MG tablet Take 0.5 tablets (12.5 mg total) by mouth once daily. 90 tablet 1    hydrOXYzine HCL (ATARAX) 25 MG tablet Take 1 tablet (25 mg total) by mouth 3 (three) times daily as needed for Itching. 60 tablet 3    levothyroxine (SYNTHROID, LEVOTHROID) 175 MCG tablet TAKE 1 TABLET(175 MCG) BY MOUTH EVERY DAY 90 tablet 3    linaCLOtide (LINZESS) 145 mcg Cap capsule Take 1 capsule (145 mcg total) by mouth before breakfast. 30 capsule 11    multivitamin capsule Take 1 capsule by mouth once daily.      pantoprazole (PROTONIX) 40 MG tablet TAKE 1 TABLET(40 MG) BY MOUTH  EVERY DAY 90 tablet 0    predniSONE (DELTASONE) 20 MG tablet 20 mg.      temazepam (RESTORIL) 30 mg capsule Take 1 capsule (30 mg total) by mouth nightly as needed. 90 capsule 0    valsartan (DIOVAN) 320 MG tablet TAKE 1 TABLET(320 MG) BY MOUTH EVERY DAY 90 tablet 1    [DISCONTINUED] oxybutynin (DITROPAN-XL) 10 MG 24 hr tablet TAKE 1 TABLET(10 MG) BY MOUTH EVERY DAY 30 tablet 3    [DISCONTINUED] vibegron (GEMTESA) 75 mg Tab Take 1 tablet (75 mg total) by mouth once daily. 30 tablet 11    alendronate (FOSAMAX) 70 MG tablet Take 1 tablet (70 mg total) by mouth every 7 days. 4 tablet 11    donepeziL (ARICEPT) 5 MG tablet Take 1 tablet (5 mg total) by mouth every evening. 90 tablet 3    mupirocin (BACTROBAN) 2 % ointment Apply topically.      oxybutynin (DITROPAN-XL) 10 MG 24 hr tablet Take 1 tablet (10 mg total) by mouth once daily. 90 tablet 3    triamcinolone acetonide 0.1% (KENALOG) 0.1 % cream Apply topically.      vibegron (GEMTESA) 75 mg Tab Take 1 tablet (75 mg total) by mouth once daily. 90 tablet 3    [DISCONTINUED] imipramine (TOFRANIL) 25 MG tablet Take 1 tablet (25 mg total) by mouth 3 (three) times daily. 270 tablet 0     No facility-administered encounter medications on file as of 2/7/2025.     Review of Systems   ROS  Physical Exam     Vitals:    02/07/25 1034   BP: (!) 118/57   Pulse: 81     Physical Exam      LABS:  Lab Results   Component Value Date    CREATININE 1.2 04/22/2024    CREATININE 1.2 03/12/2024    CREATININE 1.2 07/31/2023     Urine Culture, Routine   Date Value Ref Range Status   03/28/2024 ESCHERICHIA CECILE  >100,000 cfu/ml   (A)  Final   01/09/2023 No significant growth  Final     Hemoglobin A1C   Date Value Ref Range Status   07/31/2023 5.2 4.0 - 5.6 % Final     Comment:     ADA Screening Guidelines:  5.7-6.4%  Consistent with prediabetes  >or=6.5%  Consistent with diabetes    High levels of fetal hemoglobin interfere with the HbA1C  assay. Heterozygous hemoglobin variants (HbS, HgC,  etc)do  not significantly interfere with this assay.   However, presence of multiple variants may affect accuracy.     09/27/2022 5.2 4.0 - 5.6 % Final     Comment:     ADA Screening Guidelines:  5.7-6.4%  Consistent with prediabetes  >or=6.5%  Consistent with diabetes    High levels of fetal hemoglobin interfere with the HbA1C  assay. Heterozygous hemoglobin variants (HbS, HgC, etc)do  not significantly interfere with this assay.   However, presence of multiple variants may affect accuracy.       Radiology:    Assessment and Plan:  Harriet was seen today for follow-up.    Diagnoses and all orders for this visit:    Mixed urinary incontinence due to female genital prolapse  -     oxybutynin (DITROPAN-XL) 10 MG 24 hr tablet; Take 1 tablet (10 mg total) by mouth once daily.    Urgency-frequency syndrome  -     vibegron (GEMTESA) 75 mg Tab; Take 1 tablet (75 mg total) by mouth once daily.          Doing well on HANSA since bulkamid urethral injection.  Imipramine stopped.    However, she is still bothered by OAB and urge incontinence.  Continue Gemtesa and oxybutynin xl for her OAB.  Overall her symptoms are stable.  In the future, she can get more refills from her PCP.    Follow-up:  Follow up if symptoms worsen or fail to improve.

## 2025-02-12 ENCOUNTER — PATIENT MESSAGE (OUTPATIENT)
Dept: PRIMARY CARE CLINIC | Facility: CLINIC | Age: 89
End: 2025-02-12
Payer: COMMERCIAL

## 2025-02-24 ENCOUNTER — OFFICE VISIT (OUTPATIENT)
Dept: PRIMARY CARE CLINIC | Facility: CLINIC | Age: 89
End: 2025-02-24
Payer: COMMERCIAL

## 2025-02-24 VITALS
WEIGHT: 207 LBS | OXYGEN SATURATION: 93 % | BODY MASS INDEX: 32.42 KG/M2 | HEART RATE: 86 BPM | DIASTOLIC BLOOD PRESSURE: 70 MMHG | SYSTOLIC BLOOD PRESSURE: 110 MMHG

## 2025-02-24 DIAGNOSIS — E03.9 HYPOTHYROIDISM, UNSPECIFIED TYPE: Chronic | ICD-10-CM

## 2025-02-24 DIAGNOSIS — E78.5 HYPERLIPIDEMIA, UNSPECIFIED HYPERLIPIDEMIA TYPE: Chronic | ICD-10-CM

## 2025-02-24 DIAGNOSIS — F41.9 ANXIETY AND DEPRESSION: ICD-10-CM

## 2025-02-24 DIAGNOSIS — G47.00 INSOMNIA, UNSPECIFIED TYPE: ICD-10-CM

## 2025-02-24 DIAGNOSIS — R06.09 DOE (DYSPNEA ON EXERTION): ICD-10-CM

## 2025-02-24 DIAGNOSIS — I15.2 HYPERTENSION DUE TO ENDOCRINE DISORDER: Chronic | ICD-10-CM

## 2025-02-24 DIAGNOSIS — F32.A ANXIETY AND DEPRESSION: ICD-10-CM

## 2025-02-24 DIAGNOSIS — R41.3 MEMORY CHANGE: ICD-10-CM

## 2025-02-24 PROBLEM — E66.01 SEVERE OBESITY (BMI 35.0-39.9) WITH COMORBIDITY: Status: RESOLVED | Noted: 2024-03-22 | Resolved: 2025-02-24

## 2025-02-24 PROCEDURE — 99214 OFFICE O/P EST MOD 30 MIN: CPT | Mod: S$GLB,,, | Performed by: STUDENT IN AN ORGANIZED HEALTH CARE EDUCATION/TRAINING PROGRAM

## 2025-02-24 PROCEDURE — 1159F MED LIST DOCD IN RCRD: CPT | Mod: CPTII,S$GLB,, | Performed by: STUDENT IN AN ORGANIZED HEALTH CARE EDUCATION/TRAINING PROGRAM

## 2025-02-24 PROCEDURE — 1160F RVW MEDS BY RX/DR IN RCRD: CPT | Mod: CPTII,S$GLB,, | Performed by: STUDENT IN AN ORGANIZED HEALTH CARE EDUCATION/TRAINING PROGRAM

## 2025-02-24 PROCEDURE — 1157F ADVNC CARE PLAN IN RCRD: CPT | Mod: CPTII,S$GLB,, | Performed by: STUDENT IN AN ORGANIZED HEALTH CARE EDUCATION/TRAINING PROGRAM

## 2025-02-24 PROCEDURE — 99999 PR PBB SHADOW E&M-EST. PATIENT-LVL IV: CPT | Mod: PBBFAC,,, | Performed by: STUDENT IN AN ORGANIZED HEALTH CARE EDUCATION/TRAINING PROGRAM

## 2025-02-24 RX ORDER — AMLODIPINE BESYLATE 10 MG/1
10 TABLET ORAL DAILY
Qty: 90 TABLET | Refills: 3 | Status: SHIPPED | OUTPATIENT
Start: 2025-02-24

## 2025-02-24 RX ORDER — HYDROXYZINE HYDROCHLORIDE 25 MG/1
25 TABLET, FILM COATED ORAL 3 TIMES DAILY PRN
Qty: 60 TABLET | Refills: 3 | Status: SHIPPED | OUTPATIENT
Start: 2025-02-24

## 2025-02-24 RX ORDER — DONEPEZIL HYDROCHLORIDE 5 MG/1
5 TABLET, FILM COATED ORAL NIGHTLY
Qty: 90 TABLET | Refills: 3 | Status: SHIPPED | OUTPATIENT
Start: 2025-02-24 | End: 2026-02-24

## 2025-02-24 RX ORDER — ESCITALOPRAM OXALATE 20 MG/1
20 TABLET ORAL DAILY
Qty: 90 TABLET | Refills: 3 | Status: SHIPPED | OUTPATIENT
Start: 2025-02-24

## 2025-02-24 NOTE — PROGRESS NOTES
Subjective:     Chief Complaint: Follow-up       Patient ID: Harriet Rutherford is a 89 y.o. female       Chronic medical issues:    GERD-Protonix 40 mg daily    Chronic constipation: Trulance 3 mg, [facility]    Hypothyroidism:  currently on 200 mcg synthroid     Depression: she is taking lexapro 20 and wellbutrin 150 mg. Feels much better.      HTN:  Amlodipine 10 mg, hydrochlorothiazide 25 mg stable; occasionally experiences shortness a breath with long exertion.  no chest pain      HLD: Stable on statin     Stress incontinence: Follows with Dr. Kumar     PAD:  Leg swelling: improved; none currently; eliminated all salt from her diet     Hypothyroidism:  On Synthroid 175 mcg every morning.  Tolerating medication well.  Denies symptoms of Fatigue, Cold intolerance, Weight gain, Constipation, Dry skin, Myalgia  Due for TSH recheck.    CKD3: Most recent GFR 44.  Improved from previous check.     Memory disorder:  Donepezil 5 mg nightly.        Past Medical History:  Past Medical History:   Diagnosis Date    Breast cyst     Cataract     Chronic back pain     Hepatic steatosis 10/19/2016    Hyperlipidemia LDL goal <130     Hypertension     Hypothyroidism     OP (osteoporosis)     Smoker     Stress incontinence     Urinary tract infection        Home Medications:  Prior to Admission medications    Medication Sig Start Date End Date Taking? Authorizing Provider   alendronate (FOSAMAX) 70 MG tablet Take 1 tablet (70 mg total) by mouth every 7 days. 11/14/22 11/14/23 Yes Arpita Guevara, DO   amLODIPine (NORVASC) 10 MG tablet TAKE ONE TABLET BY MOUTH EVERY DAY 5/19/23  Yes Jael Villanueva, FRANKLYN   aspirin (ECOTRIN) 81 MG EC tablet Take 81 mg by mouth once daily.   Yes Historical Provider   atorvastatin (LIPITOR) 40 MG tablet Take 1 tablet (40 mg total) by mouth once daily. 10/22/22  Yes Arpita Guevara, DO   buPROPion (WELLBUTRIN XL) 150 MG TB24 tablet Take 1 tablet (150 mg total) by mouth once daily. 10/23/22  Yes  Arpita Guevara DO   CA CITRATE/MGOX/VIT D3/B6/MIN (CITRACAL PLUS ORAL) Take 1,200 mg by mouth once daily.   Yes Historical Provider   diclofenac sodium (SOLARAZE) 3 % gel Apply to affected area 4-5 times daily prn pain 4/28/21  Yes Arpita Guevara DO   donepeziL (ARICEPT) 5 MG tablet Take 1 tablet (5 mg total) by mouth every evening. 9/27/22 9/27/23 Yes Arpita Guevara DO   EScitalopram oxalate (LEXAPRO) 20 MG tablet TAKE ONE TABLET BY MOUTH EVERY DAY. 3/20/23  Yes Arpita Guevara DO   fenofibrate 160 MG Tab TAKE ONE TABLET BY MOUTH EVERY DAY. 3/20/23  Yes Arpita Guevara DO   hydroCHLOROthiazide (HYDRODIURIL) 25 MG tablet Take 0.5 tablets (12.5 mg total) by mouth once daily. 10/4/22  Yes Arpita Guevara DO   hydrOXYzine HCl (ATARAX) 25 MG tablet Take 1 tablet (25 mg total) by mouth 3 (three) times daily as needed for Itching. 12/5/17  Yes Arpita Guevara DO   imipramine (TOFRANIL) 25 MG tablet Take 1 tablet (25 mg total) by mouth 3 (three) times daily. 11/15/22 11/15/23 Yes Patel Kumar MD   levothyroxine (SYNTHROID, LEVOTHROID) 175 MCG tablet TAKE ONE TABLET BY MOUTH EVERY DAY. 3/20/23  Yes Arpita Guevara DO   multivitamin capsule Take 1 capsule by mouth once daily.   Yes Historical Provider   oxybutynin (DITROPAN-XL) 10 MG 24 hr tablet Take 1 tablet (10 mg total) by mouth once daily. 12/22/22 12/22/23 Yes Patel Kumar MD   temazepam (RESTORIL) 30 mg capsule Take one capsule by mouth every evening 5/23/23  Yes Arpita Guevara DO   valsartan (DIOVAN) 320 MG tablet TAKE ONE TABLET BY MOUTH EVERY DAY. 10/5/22  Yes Arpita Guevara DO   conjugated estrogens (PREMARIN) vaginal cream Place 1 g vaginally every other day. 11/15/22 12/15/22  Patel Kumar MD   pantoprazole (PROTONIX) 40 MG tablet Take 1 tablet (40 mg total) by mouth once daily. 6/23/23 6/22/24  Manju Hawthorne MD       Allergies:  Review of patient's allergies indicates:   Allergen Reactions    Lisinopril Other (See  Comments)     cough    Sulfur      Other reaction(s): Urticaria    Sulfa (sulfonamide antibiotics) Rash       Social History:  Social History     Tobacco Use    Smoking status: Former     Current packs/day: 0.00     Average packs/day: 1 pack/day for 65.9 years (65.9 ttl pk-yrs)     Types: Cigarettes     Start date: 1950     Quit date: 2016     Years since quittin.6    Smokeless tobacco: Never   Substance Use Topics    Alcohol use: Yes     Alcohol/week: 1.0 standard drink of alcohol     Types: 1 Glasses of wine per week     Comment: RARELY    Drug use: No         Review of Systems   Constitutional:  Negative for diaphoresis, fatigue and fever.   HENT:  Positive for rhinorrhea. Negative for congestion, ear pain, sinus pain, sneezing, sore throat and voice change.    Eyes:  Negative for discharge, redness and itching.   Respiratory:  Positive for cough. Negative for shortness of breath and wheezing.    Cardiovascular:  Negative for chest pain.   Gastrointestinal:  Negative for abdominal pain.   Musculoskeletal:  Negative for gait problem and joint swelling.   Skin:  Negative for color change, pallor, rash and wound.   Neurological:  Negative for weakness.           Objective:   /70 (BP Location: Right arm)   Pulse 86   Wt 93.9 kg (207 lb 0.2 oz)   SpO2 (!) 93%   BMI 32.42 kg/m²        General: AAO x3, no apparent distress  HEENT: PERRL, OP clear  CV: RRR, no m/r/g  Pulm: Lungs CTAB, no crackles, no wheezes  Abd: s/NT/ND +BS  Extremities: no c/c/e    Labs:         Assessment/Plan     1. Hypertension due to endocrine disorder  -     amLODIPine (NORVASC) 10 MG tablet; Take 1 tablet (10 mg total) by mouth once daily.  Dispense: 90 tablet; Refill: 3  -     EScitalopram oxalate (LEXAPRO) 20 MG tablet; Take 1 tablet (20 mg total) by mouth once daily.  Dispense: 90 tablet; Refill: 3  Stable on medications, continue regimen    2. Hyperlipidemia, unspecified hyperlipidemia type  -     amLODIPine (NORVASC)  10 MG tablet; Take 1 tablet (10 mg total) by mouth once daily.  Dispense: 90 tablet; Refill: 3  -     EScitalopram oxalate (LEXAPRO) 20 MG tablet; Take 1 tablet (20 mg total) by mouth once daily.  Dispense: 90 tablet; Refill: 3  Stable on medications, continue regimen    3. Hypothyroidism, unspecified type  -     amLODIPine (NORVASC) 10 MG tablet; Take 1 tablet (10 mg total) by mouth once daily.  Dispense: 90 tablet; Refill: 3  -     EScitalopram oxalate (LEXAPRO) 20 MG tablet; Take 1 tablet (20 mg total) by mouth once daily.  Dispense: 90 tablet; Refill: 3  Stable on medications, continue regimen    4. ALMENDAREZ (dyspnea on exertion)  -     amLODIPine (NORVASC) 10 MG tablet; Take 1 tablet (10 mg total) by mouth once daily.  Dispense: 90 tablet; Refill: 3  -     EScitalopram oxalate (LEXAPRO) 20 MG tablet; Take 1 tablet (20 mg total) by mouth once daily.  Dispense: 90 tablet; Refill: 3    5. Anxiety and depression  -     amLODIPine (NORVASC) 10 MG tablet; Take 1 tablet (10 mg total) by mouth once daily.  Dispense: 90 tablet; Refill: 3  -     EScitalopram oxalate (LEXAPRO) 20 MG tablet; Take 1 tablet (20 mg total) by mouth once daily.  Dispense: 90 tablet; Refill: 3    6. Memory change  -     donepeziL (ARICEPT) 5 MG tablet; Take 1 tablet (5 mg total) by mouth every evening.  Dispense: 90 tablet; Refill: 3  Stable on medications, continue regimen    7. Insomnia, unspecified type  -     hydrOXYzine HCL (ATARAX) 25 MG tablet; Take 1 tablet (25 mg total) by mouth 3 (three) times daily as needed for Itching.  Dispense: 60 tablet; Refill: 3            Manju Hawthorne MD  Department of Internal Medicine - [facility]

## 2025-02-25 DIAGNOSIS — G47.00 INSOMNIA, UNSPECIFIED TYPE: ICD-10-CM

## 2025-02-25 RX ORDER — TEMAZEPAM 30 MG/1
30 CAPSULE ORAL NIGHTLY PRN
Qty: 90 CAPSULE | Refills: 0 | Status: SHIPPED | OUTPATIENT
Start: 2025-02-25

## 2025-02-25 NOTE — TELEPHONE ENCOUNTER
Care Due:                  Date            Visit Type   Department     Provider  --------------------------------------------------------------------------------                                EP -                              PRIMARY      [facility] PRIMARY  Last Visit: 02-      CARE ([facility])   ARABELLA Hawthorne                              EP -                              PRIMARY      [facility] PRIMARY  Next Visit: 08-      CARE ([facility])   [facility]           Manju Hawthorne                                                            Last  Test          Frequency    Reason                     Performed    Due Date  --------------------------------------------------------------------------------    CBC.........  12 months..  fenofibrate..............  03- 03-    CMP.........  12 months..  atorvastatin,              03- 03-                             fenofibrate, valsartan...    Lipid Panel.  12 months..  atorvastatin, fenofibrate  07- 07-    TSH.........  12 months..  levothyroxine............  07- 07-    [facility] Embedded Care Due Messages. Reference number: [id number].   2/25/2025 2:21:58 PM CST

## 2025-02-25 NOTE — TELEPHONE ENCOUNTER
----- Message from Digna sent at 2/25/2025  2:16 PM CST -----  Type:  Needs Medical AdviceWho Called: GABBY FONG [[id number]]Pharmacy: [id number] [place] [address] [address] AT [facility] [address] Phone: [phone]: [phone] the patient rather a call back or a response via [facility]? Call back Best Call Back Number: [phone] Additional Information: Patient states [facility] did not receive the prescription for Temazepam 30 mg. Patient would like the medication sent over as soon as possible. Patient would like a call back with further information

## 2025-03-13 ENCOUNTER — LAB VISIT (OUTPATIENT)
Dept: LAB | Facility: HOSPITAL | Age: 89
End: 2025-03-13
Attending: STUDENT IN AN ORGANIZED HEALTH CARE EDUCATION/TRAINING PROGRAM
Payer: COMMERCIAL

## 2025-03-13 DIAGNOSIS — Z79.899 OTHER LONG TERM (CURRENT) DRUG THERAPY: ICD-10-CM

## 2025-03-13 LAB
ALBUMIN SERPL BCP-MCNC: 3.7 G/DL (ref 3.5–5.2)
ALP SERPL-CCNC: 104 U/L (ref 40–150)
ALT SERPL W/O P-5'-P-CCNC: 19 U/L (ref 10–44)
ANION GAP SERPL CALC-SCNC: 11 MMOL/L (ref 8–16)
AST SERPL-CCNC: 17 U/L (ref 10–40)
BASOPHILS # BLD AUTO: 0.05 K/UL (ref 0–0.2)
BASOPHILS NFR BLD: 0.8 % (ref 0–1.9)
BILIRUB SERPL-MCNC: 0.3 MG/DL (ref 0.1–1)
BUN SERPL-MCNC: 24 MG/DL (ref 8–23)
CALCIUM SERPL-MCNC: 9.5 MG/DL (ref 8.7–10.5)
CHLORIDE SERPL-SCNC: 109 MMOL/L (ref 95–110)
CHOLEST SERPL-MCNC: 209 MG/DL (ref 120–199)
CHOLEST/HDLC SERPL: 4.1 {RATIO} (ref 2–5)
CO2 SERPL-SCNC: 22 MMOL/L (ref 23–29)
CREAT SERPL-MCNC: 0.9 MG/DL (ref 0.5–1.4)
DIFFERENTIAL METHOD BLD: ABNORMAL
EOSINOPHIL # BLD AUTO: 0.2 K/UL (ref 0–0.5)
EOSINOPHIL NFR BLD: 3.7 % (ref 0–8)
ERYTHROCYTE [DISTWIDTH] IN BLOOD BY AUTOMATED COUNT: 13.8 % (ref 11.5–14.5)
EST. GFR  (NO RACE VARIABLE): >60 ML/MIN/1.73 M^2
ESTIMATED AVG GLUCOSE: 103 MG/DL (ref 68–131)
GLUCOSE SERPL-MCNC: 86 MG/DL (ref 70–110)
HBA1C MFR BLD: 5.2 % (ref 4–5.6)
HCT VFR BLD AUTO: 44.4 % (ref 37–48.5)
HDLC SERPL-MCNC: 51 MG/DL (ref 40–75)
HDLC SERPL: 24.4 % (ref 20–50)
HGB BLD-MCNC: 13.8 G/DL (ref 12–16)
IMM GRANULOCYTES # BLD AUTO: 0.03 K/UL (ref 0–0.04)
IMM GRANULOCYTES NFR BLD AUTO: 0.5 % (ref 0–0.5)
LDLC SERPL CALC-MCNC: 126.8 MG/DL (ref 63–159)
LYMPHOCYTES # BLD AUTO: 1.6 K/UL (ref 1–4.8)
LYMPHOCYTES NFR BLD: 24 % (ref 18–48)
MCH RBC QN AUTO: 29.9 PG (ref 27–31)
MCHC RBC AUTO-ENTMCNC: 31.1 G/DL (ref 32–36)
MCV RBC AUTO: 96 FL (ref 82–98)
MONOCYTES # BLD AUTO: 0.9 K/UL (ref 0.3–1)
MONOCYTES NFR BLD: 13.4 % (ref 4–15)
NEUTROPHILS # BLD AUTO: 3.7 K/UL (ref 1.8–7.7)
NEUTROPHILS NFR BLD: 57.6 % (ref 38–73)
NONHDLC SERPL-MCNC: 158 MG/DL
NRBC BLD-RTO: 0 /100 WBC
PLATELET # BLD AUTO: 389 K/UL (ref 150–450)
PMV BLD AUTO: 10.2 FL (ref 9.2–12.9)
POTASSIUM SERPL-SCNC: 4.3 MMOL/L (ref 3.5–5.1)
PROT SERPL-MCNC: 6.6 G/DL (ref 6–8.4)
RBC # BLD AUTO: 4.61 M/UL (ref 4–5.4)
SODIUM SERPL-SCNC: 142 MMOL/L (ref 136–145)
TRIGL SERPL-MCNC: 156 MG/DL (ref 30–150)
TSH SERPL DL<=0.005 MIU/L-ACNC: 1.95 UIU/ML (ref 0.4–4)
WBC # BLD AUTO: 6.49 K/UL (ref 3.9–12.7)

## 2025-03-13 PROCEDURE — 83036 HEMOGLOBIN GLYCOSYLATED A1C: CPT | Performed by: STUDENT IN AN ORGANIZED HEALTH CARE EDUCATION/TRAINING PROGRAM

## 2025-03-13 PROCEDURE — 36415 COLL VENOUS BLD VENIPUNCTURE: CPT | Performed by: STUDENT IN AN ORGANIZED HEALTH CARE EDUCATION/TRAINING PROGRAM

## 2025-03-13 PROCEDURE — 80061 LIPID PANEL: CPT | Performed by: STUDENT IN AN ORGANIZED HEALTH CARE EDUCATION/TRAINING PROGRAM

## 2025-03-13 PROCEDURE — 85025 COMPLETE CBC W/AUTO DIFF WBC: CPT | Performed by: STUDENT IN AN ORGANIZED HEALTH CARE EDUCATION/TRAINING PROGRAM

## 2025-03-13 PROCEDURE — 84443 ASSAY THYROID STIM HORMONE: CPT | Performed by: STUDENT IN AN ORGANIZED HEALTH CARE EDUCATION/TRAINING PROGRAM

## 2025-03-13 PROCEDURE — 80053 COMPREHEN METABOLIC PANEL: CPT | Performed by: STUDENT IN AN ORGANIZED HEALTH CARE EDUCATION/TRAINING PROGRAM

## 2025-03-14 ENCOUNTER — RESULTS FOLLOW-UP (OUTPATIENT)
Dept: PRIMARY CARE CLINIC | Facility: CLINIC | Age: 89
End: 2025-03-14

## 2025-04-15 ENCOUNTER — OFFICE VISIT (OUTPATIENT)
Dept: SLEEP MEDICINE | Facility: CLINIC | Age: 89
End: 2025-04-15
Attending: PSYCHIATRY & NEUROLOGY
Payer: COMMERCIAL

## 2025-04-15 VITALS
HEART RATE: 68 BPM | DIASTOLIC BLOOD PRESSURE: 69 MMHG | BODY MASS INDEX: 32.8 KG/M2 | HEIGHT: 67 IN | WEIGHT: 209 LBS | SYSTOLIC BLOOD PRESSURE: 127 MMHG

## 2025-04-15 DIAGNOSIS — G47.00 INSOMNIA, UNSPECIFIED TYPE: Primary | ICD-10-CM

## 2025-04-15 PROCEDURE — 1157F ADVNC CARE PLAN IN RCRD: CPT | Mod: CPTII,S$GLB,, | Performed by: PSYCHIATRY & NEUROLOGY

## 2025-04-15 PROCEDURE — 99999 PR PBB SHADOW E&M-EST. PATIENT-LVL III: CPT | Mod: PBBFAC,,, | Performed by: PSYCHIATRY & NEUROLOGY

## 2025-04-15 PROCEDURE — 99214 OFFICE O/P EST MOD 30 MIN: CPT | Mod: S$GLB,,, | Performed by: PSYCHIATRY & NEUROLOGY

## 2025-04-15 PROCEDURE — 3288F FALL RISK ASSESSMENT DOCD: CPT | Mod: CPTII,S$GLB,, | Performed by: PSYCHIATRY & NEUROLOGY

## 2025-04-15 PROCEDURE — 1126F AMNT PAIN NOTED NONE PRSNT: CPT | Mod: CPTII,S$GLB,, | Performed by: PSYCHIATRY & NEUROLOGY

## 2025-04-15 PROCEDURE — 1101F PT FALLS ASSESS-DOCD LE1/YR: CPT | Mod: CPTII,S$GLB,, | Performed by: PSYCHIATRY & NEUROLOGY

## 2025-04-15 RX ORDER — TRAZODONE HYDROCHLORIDE 50 MG/1
TABLET ORAL
Qty: 30 TABLET | Refills: 5 | Status: SHIPPED | OUTPATIENT
Start: 2025-04-15

## 2025-04-15 NOTE — PATIENT INSTRUCTIONS
Will increase ramp pressure from 7 to 8; will increase APAP min from 5 to 8 due to residual AHI  Will reorder supplies with [employer] 12 mask  Will try to stop Temazepam (cognitive side effects are of [email or web address] and try replacing by trazodone.     Recommended to stop caffeine - no coffee after 1 PM    I put blue light filter on her phone.

## 2025-04-15 NOTE — PROGRESS NOTES
4/15/2025     9:07 AM 6/22/2020     6:34 PM 9/23/2019     7:25 PM 7/15/2019     1:21 PM   EPWORTH SLEEPINESS SCALE TOTAL SCORE    Total score 2  8 6 8       Proxy-reported       Harriet Rutherford is a 89 y.o. female seen today for the  follow up. Last seen on 9/24/2019.      Current treatment:     []  CPAP [x] APAP [] BPAP [] AutoBPAP [] ASV [] No previous PAP  [] Supplemental oxygen   [] None        Interrogation:  mrpapmachine : [employer] Dreamstation 2:  5-15 - she set the ramp start at 7 cm due to air hunger, but starting pressure is still 5    Patient ID: [id number]... Harriet Rutherford Compliance Summary 4/7/2025 - 4/13/2025 (7 days) Days with Device Usage 7 days Days without Device Usage 0 days Percent Days with Device Usage 100.0% Cumulative Usage 1 day 16 hrs. 53 mins. 44 secs. Maximum Usage (1 Day) 8 hrs. 34 mins. 29 secs. Average Usage (All Days) 5 hrs. 50 mins. 32 secs. Average Usage (Days Used) 5 hrs. 50 mins. 32 secs. Minimum Usage (1 Day) 2 hrs. 34 secs. Percent of Days with Usage >= 4 Hours 71.4% Percent of Days with Usage < 4 Hours 28.6% Date Range Total Blower Time 2 days 40 mins. 7 secs. Average AHI 4.2 Auto-CPAP Summary Auto-CPAP Mean Pressure 6.9 cmH2O Auto-CPAP Peak Average Pressure 8.6 cmH2O Device Pressure <= 90% of Time 8.9 cmH2O Average Time in Large Leak Per Day 17 secs. Device Settings as of 4/13/2025 AutoCPAP - A-Flex Device Settings Device Mode Parameter Value Min Pressure 5 cmH2O Max Pressure 18 cmH2O A-Flex Setting 2 Auto Off Off Auto On Off Ramp+ Time 15 minutes Ramp+ Start Pressure 7.0 cmH2O Mask Resistance Off Tubing Type 15 Opti-Start Off EZ-Start Disabled Patient Controls Access On Patient Data Access On Tube Temperature 3 Humidifier 1 Printed By: Care  Version: 1.54.1 Page 1      Compliance:      [x] Met [] Failed initial  compliance      [x]  Remains [] Does not stay compliant with PAP therapy      Compliance with the treatment has been [] Improving []  deteriorating       Goes to bed at midnight; taking Temazepam for Insomnia. Previously diagnosed with delayed sleep phase.    Still using Ipad late at night  Not using blue light filters  + some coffee until almost to go to bed.     Response to CPAP Therapy:    Tolerating positive airway pressure [] Well [] Poorly     Possible obstacles to using CPAP:    Aerophagia                      yes []   no  [x]                Breakthrough Snoring   yes []   no []    Pressure Intolerance      yes []   no  [x]               Air Hunger                      yes []   no  [x] After increseing ramp start to 7    Dry Mouth                      yes []   no  [x]               Nasal dryness                yes []   no  [x]    Condensation                 yes []   no  [x]    Mask Discomfort            yes []   no  [x]              Mask Leaks                    yes []    no  [x]                                             Mouth Leaks:  yes []   no  []     On positive airway pressure therapy, the patient experienced:                                 improved  Sleep continuity [x] Yes [] No  improved  Daytime Sleepiness [] Yes [] No  improved   Fatigue [x] Yes [] No   Not interested in getting a new mac rae yet.          INTERVAL HISTORY:    9/24/2019:    Harriet Rutherford 89 y.o. year-old female returns for management of obstructive sleep apnea and CPAP equipment check.     08/18/2021 KATLIN Valiente NP: Here for annual f/u. Continues to use CPAP without breakthrough symptoms. Only issue with nasal pillows mask is indentation it leaves in morning. Aware of recall - would actually like sample inline filter.     Dreamstation, Set up 10/07/2019, APAP 5 - 18 cm, Ave Use: 7 h 23 m, Predicted AHI: 3.4, 90%tile pressure: 6.8 cm, > 4 hrs: 86.7%     06/24/2020 KATLIN Valiente NP: Checked-in 17 minutes into 20 min appt.  Uses PAP machine regularly and denies breakthrough  snoring, air gasping, witnessed apneas, excessive daytime sleepiness, or excessive daytime  "fatigue. Denies mask or pressure issues. Denies nasal congestion or aerophagia. Reports taking melatonin 10 mg nightly because that's the only strength her drug store had available, but reports 5 mg just as effective. PCP also prescribed temazepam 30 mg to pt to further optimize ability to fall and stay asleep. Reports since starting temazepam that she has residual "grogginess" in the mornings.      ESS 8     CPAP Interrogation: did not bring machine  Encore: APAP 5 - 18 cm, 29 days, > 4 hr: 90%,  Predicted AHI: 4.3, 90%: 7.8 cm     11/14/2019 KATLIN Valiente NP: Pt returns after set up of PAP machine on 10/07/2019 at [facility]. Pt sleep complaints of snoring, air gasping, witnessed apneas, excessive daytime sleepiness, and excessive daytime fatigue now resolved with PAP use. Denies oral/nasal drying with Dreamwear nasal pillows mask. Denies mask leaks. Denies rainout.  Denies pressure intolerance or air hunger. Denies congestion. Denies aerophagia. ESS 8.      Dreamstation APAP 5 - 18 cm, 29 days, > 4 hours: 90%, Predicted AHI: 2.8, 90%: 7 cm     Baseline Sleep Study: 08/13/2019  lb. AHI 27, RDI 39, O2 mirian 77.5%  Review of Systems: Sleep related symptoms as per HPI. Otherwise, a balance of 10 systems reviewed is negative.    Physical Exam:   /69 (BP Location: Left arm, Patient Position: Sitting)   Pulse 68   Ht 5' 7" (1.702 m)   Wt 94.8 kg (208 lb 15.9 oz)   BMI 32.73 kg/m²   GENERAL: Well groomed  Wt 214 lb (prior 209 lb)    Assessment:     Obstructive sleep apnea, moderate by AHI, severe by AHI.  The patient symptomatically has snoring, air gasping, witnessed apneas, excessive daytime sleepiness, and excessive daytime fatigue which 1/ improves with CPAP use. The patient is adherent on CPAP and experiencing symptomatic benefit.  Medical co-morbidities: HLD and obesity.    2. Insomnia; Delayed sleep phase may play a role. Taking Temazepam for Insomnia    Plan:      Will increase ramp pressure from 7 to 8; " will increase APAP min from 5 to 8 due to residual AHI  Will reorder supplies with Dreamwear 1 mask  Will try to stop Temazepam (cognitive side effects are of [email or web address] and try replacing by Trazodone.     Recommended to stop caffeine - no coffee after 1 PM    I put blue light filter on her phone.     The Sleep Medicine providers at [facility] have not been given any more information than what is provided on [facility] web site.   It is very difficult to advise you how to proceed with using your current machine or not when we do not know the magnitude of risk that machine is placing you at.   We are working towards more options and getting more information.   We will continue to update you as to our efforts.    Provided sample inline filter. Prefers to continue APAP 5-18 cm despite recall. Offered to order new PAP machine, declined since she's not insurance-eligible and not interested in purchasing out of pocket.      Precautions: The patient was advised to abstain from driving should they feel sleepy or drowsy     RTC in 12 months months, sooner if needed.

## 2025-05-27 DIAGNOSIS — G47.00 INSOMNIA, UNSPECIFIED TYPE: ICD-10-CM

## 2025-05-27 NOTE — TELEPHONE ENCOUNTER
No care due was identified.  [facility] Embedded Care Due Messages. Reference number: [id number].   5/27/2025 2:44:37 PM CDT

## 2025-05-28 DIAGNOSIS — I10 HYPERTENSION, UNSPECIFIED TYPE: Chronic | ICD-10-CM

## 2025-05-28 RX ORDER — VALSARTAN 320 MG/1
320 TABLET ORAL
Qty: 90 TABLET | Refills: 2 | Status: SHIPPED | OUTPATIENT
Start: 2025-05-28

## 2025-05-28 RX ORDER — TEMAZEPAM 30 MG/1
CAPSULE ORAL
Qty: 30 CAPSULE | Refills: 0 | Status: SHIPPED | OUTPATIENT
Start: 2025-05-28

## 2025-05-28 NOTE — TELEPHONE ENCOUNTER
No care due was identified.  Health [facility] Embedded Care Due Messages. Reference number: [id number].   5/28/2025 8:06:23 AM CDT

## 2025-05-28 NOTE — TELEPHONE ENCOUNTER
Refill Decision Note   Harriet Rutherford  is requesting a refill authorization.  Brief Assessment and Rationale for Refill:  Approve     Medication Therapy Plan:         Comments:     Note composed:9:45 AM 05/28/2025

## 2025-06-20 ENCOUNTER — OFFICE VISIT (OUTPATIENT)
Dept: PRIMARY CARE CLINIC | Facility: CLINIC | Age: 89
End: 2025-06-20
Payer: COMMERCIAL

## 2025-06-20 VITALS
HEART RATE: 77 BPM | WEIGHT: 205.69 LBS | OXYGEN SATURATION: 94 % | SYSTOLIC BLOOD PRESSURE: 125 MMHG | DIASTOLIC BLOOD PRESSURE: 75 MMHG | BODY MASS INDEX: 32.22 KG/M2

## 2025-06-20 DIAGNOSIS — Z01.818 PRE-OP EXAMINATION: Primary | ICD-10-CM

## 2025-06-20 DIAGNOSIS — C53.9 MALIGNANT NEOPLASM OF CERVIX, UNSPECIFIED SITE: ICD-10-CM

## 2025-06-20 DIAGNOSIS — I15.2 HYPERTENSION DUE TO ENDOCRINE DISORDER: ICD-10-CM

## 2025-06-20 DIAGNOSIS — M21.619 BUNION OF GREAT TOE: ICD-10-CM

## 2025-06-20 DIAGNOSIS — Z12.31 ENCOUNTER FOR SCREENING MAMMOGRAM FOR MALIGNANT NEOPLASM OF BREAST: ICD-10-CM

## 2025-06-20 DIAGNOSIS — E03.9 HYPOTHYROIDISM, UNSPECIFIED TYPE: Chronic | ICD-10-CM

## 2025-06-20 DIAGNOSIS — E78.2 MIXED HYPERLIPIDEMIA: ICD-10-CM

## 2025-06-20 PROCEDURE — 99999 PR PBB SHADOW E&M-EST. PATIENT-LVL V: CPT | Mod: PBBFAC,,, | Performed by: STUDENT IN AN ORGANIZED HEALTH CARE EDUCATION/TRAINING PROGRAM

## 2025-06-20 NOTE — PROGRESS NOTES
Subjective:     Chief Complaint: Pre-op Exam       Patient ID: Harriet Rutherford is a 89 y.o. female     Preop examination:  Procedure- Bunionectomy, 1st big toe  Date-7/14/25  Performing physician- Dr. Brayan Mckay  Not on blood thinners  No problems with anesthesia in the past  Vital signs within normal limits in clinic today.  Nicotine use:n/a      Chronic medical issues:    GERD-Protonix 40 mg daily    Chronic constipation: Trulance 3 mg, sees Dr. Olga Christina GI    Hypothyroidism:  currently on 200 mcg synthroid     Depression: she is taking lexapro 20 and wellbutrin 150 mg. Feels much better.      HTN:  Amlodipine 10 mg, hydrochlorothiazide 25 mg stable; occasionally experiences shortness a breath with long exertion.  no chest pain      HLD: Stable on statin     Stress incontinence: Follows with Dr. Kumar     PAD:  Leg swelling: improved; none currently; eliminated all salt from her diet     Hypothyroidism:  On Synthroid 175 mcg every morning.  Tolerating medication well.  Denies symptoms of Fatigue, Cold intolerance, Weight gain, Constipation, Dry skin, Myalgia  Due for TSH recheck.    CKD3: Most recent GFR 44.  Improved from previous check.     Memory disorder:  Donepezil 5 mg nightly.    Hx of complete hysterectomy  for cervical cancer    Past Medical History:  Past Medical History:   Diagnosis Date    Breast cyst     Cataract     Chronic back pain     Hepatic steatosis 10/19/2016    Hyperlipidemia LDL goal <130     Hypertension     Hypothyroidism     OP (osteoporosis)     Smoker     Stress incontinence     Urinary tract infection        Home Medications:  Prior to Admission medications    Medication Sig Start Date End Date Taking? Authorizing Provider   alendronate (FOSAMAX) 70 MG tablet Take 1 tablet (70 mg total) by mouth every 7 days. 11/14/22 11/14/23 Yes Arpita Guevara,    amLODIPine (NORVASC) 10 MG tablet TAKE ONE TABLET BY MOUTH EVERY DAY 5/19/23  Yes Jael Villanueva, FRANKLYN   aspirin (ECOTRIN) 81 MG EC  tablet Take 81 mg by mouth once daily.   Yes Historical Provider   atorvastatin (LIPITOR) 40 MG tablet Take 1 tablet (40 mg total) by mouth once daily. 10/22/22  Yes Arpita Guevara DO   buPROPion (WELLBUTRIN XL) 150 MG TB24 tablet Take 1 tablet (150 mg total) by mouth once daily. 10/23/22  Yes Arpita Guevara DO   CA CITRATE/MGOX/VIT D3/B6/MIN (CITRACAL PLUS ORAL) Take 1,200 mg by mouth once daily.   Yes Historical Provider   diclofenac sodium (SOLARAZE) 3 % gel Apply to affected area 4-5 times daily prn pain 4/28/21  Yes Arpita Guevara DO   donepeziL (ARICEPT) 5 MG tablet Take 1 tablet (5 mg total) by mouth every evening. 9/27/22 9/27/23 Yes Arpita Guevara DO   EScitalopram oxalate (LEXAPRO) 20 MG tablet TAKE ONE TABLET BY MOUTH EVERY DAY. 3/20/23  Yes Arpita Guevara DO   fenofibrate 160 MG Tab TAKE ONE TABLET BY MOUTH EVERY DAY. 3/20/23  Yes Arpita Guevara DO   hydroCHLOROthiazide (HYDRODIURIL) 25 MG tablet Take 0.5 tablets (12.5 mg total) by mouth once daily. 10/4/22  Yes Arpita Guevara DO   hydrOXYzine HCl (ATARAX) 25 MG tablet Take 1 tablet (25 mg total) by mouth 3 (three) times daily as needed for Itching. 12/5/17  Yes Arpita Guevara DO   imipramine (TOFRANIL) 25 MG tablet Take 1 tablet (25 mg total) by mouth 3 (three) times daily. 11/15/22 11/15/23 Yes Patel Kumar MD   levothyroxine (SYNTHROID, LEVOTHROID) 175 MCG tablet TAKE ONE TABLET BY MOUTH EVERY DAY. 3/20/23  Yes Arpita Guevara DO   multivitamin capsule Take 1 capsule by mouth once daily.   Yes Historical Provider   oxybutynin (DITROPAN-XL) 10 MG 24 hr tablet Take 1 tablet (10 mg total) by mouth once daily. 12/22/22 12/22/23 Yes Patel Kumar MD   temazepam (RESTORIL) 30 mg capsule Take one capsule by mouth every evening 5/23/23  Yes Arpita Guevara DO   valsartan (DIOVAN) 320 MG tablet TAKE ONE TABLET BY MOUTH EVERY DAY. 10/5/22  Yes Arpita Guevara DO   conjugated estrogens (PREMARIN) vaginal cream Place 1  g vaginally every other day. 11/15/22 12/15/22  Patel Kumar MD   pantoprazole (PROTONIX) 40 MG tablet Take 1 tablet (40 mg total) by mouth once daily. 23  Manju Hawthorne MD       Allergies:  Review of patient's allergies indicates:   Allergen Reactions    Lisinopril Other (See Comments)     cough    Sulfur      Other reaction(s): Urticaria    Sulfa (sulfonamide antibiotics) Rash       Social History:  Social History     Tobacco Use    Smoking status: Former     Current packs/day: 0.00     Average packs/day: 1 pack/day for 65.9 years (65.9 ttl pk-yrs)     Types: Cigarettes     Start date: 1950     Quit date: 2016     Years since quittin.9    Smokeless tobacco: Never   Substance Use Topics    Alcohol use: Yes     Alcohol/week: 1.0 standard drink of alcohol     Types: 1 Glasses of wine per week     Comment: RARELY    Drug use: No         Review of Systems   Constitutional:  Negative for diaphoresis, fatigue and fever.   HENT:  Negative for congestion, ear pain, rhinorrhea, sinus pain, sneezing, sore throat and voice change.    Eyes:  Negative for discharge, redness and itching.   Respiratory:  Negative for cough, shortness of breath and wheezing.    Cardiovascular:  Negative for chest pain.   Gastrointestinal:  Negative for abdominal pain.   Musculoskeletal:  Negative for gait problem and joint swelling.   Skin:  Negative for color change, pallor, rash and wound.   Neurological:  Negative for weakness.           Objective:   /75   Pulse 77   Wt 93.3 kg (205 lb 11 oz)   SpO2 (!) 94%   BMI 32.22 kg/m²        General: AAO x3, no apparent distress  HEENT: PERRL, OP clear  CV: RRR, no m/r/g  Pulm: Lungs CTAB, no crackles, no wheezes  Abd: s/NT/ND +BS  Extremities: no c/c/e    Labs:         Assessment/Plan       1. Pre-op examination  2. Bunion of great toe   - the patient is personally medium risk preoperatively for a general medium risk procedure; that said, understanding was  expressed that there is an ever present/irreducible operative risk which was found acceptable; the patient wishes to proceed   - medical management:  No medications required discontinuation   - Harriet Rutherford is optimized to proceed with surgery    3. Encounter for screening mammogram for malignant neoplasm of breast  -     Mammo Digital Screening Bilat w/ Tye (XPD); Future; Expected date: 06/20/2025    4. Malignant neoplasm of cervix, unspecified site  In remission    5. Hypertension due to endocrine disorder  Stable on medications, continue regimen    6. Mixed hyperlipidemia  Stable on medications, continue regimen    7. Hypothyroidism, unspecified type  Stable on medications, continue regimen    This is a patient with a chronic and complex diagnoses whose overall, ongoing care is being managed and monitored by me and our Internal Medicine clinic.   As such, since 2024,  is the appropriate add-on code to accompany the other E/M billing for this visit.        Manju Hawthorne MD  Department of Internal Medicine - [facility]

## 2025-06-23 PROBLEM — C53.9 MALIGNANT NEOPLASM OF CERVIX, UNSPECIFIED SITE: Status: ACTIVE | Noted: 2025-06-23

## 2025-07-01 DIAGNOSIS — G47.00 INSOMNIA, UNSPECIFIED TYPE: ICD-10-CM

## 2025-07-01 RX ORDER — TEMAZEPAM 30 MG/1
30 CAPSULE ORAL NIGHTLY PRN
Qty: 30 CAPSULE | Refills: 0 | Status: SHIPPED | OUTPATIENT
Start: 2025-07-01

## 2025-07-01 NOTE — TELEPHONE ENCOUNTER
No care due was identified.  Health [facility] Embedded Care Due Messages. Reference number: [id number].   7/01/2025 9:26:14 AM CDT

## 2025-07-09 ENCOUNTER — PATIENT MESSAGE (OUTPATIENT)
Dept: PRIMARY CARE CLINIC | Facility: CLINIC | Age: 89
End: 2025-07-09
Payer: COMMERCIAL

## 2025-07-09 ENCOUNTER — TELEPHONE (OUTPATIENT)
Dept: PRIMARY CARE CLINIC | Facility: CLINIC | Age: 89
End: 2025-07-09
Payer: COMMERCIAL

## 2025-07-09 NOTE — TELEPHONE ENCOUNTER
Copied from CRM #[id number]. Topic: General Inquiry - Patient Advice  >> Jul 9, 2025 10:18 AM Dontrell wrote:  Type:  Needs Medical Advice    Who Called: Patient  Symptoms (please be specific): na   How long has patient had these symptoms:  na  Pharmacy name and phone #:  na  Would the patient rather a call back or a response via [facility]? Call back  Best Call Back Number: [phone]  Additional Information: Pt is requesting a call back asap regarding questions concerning her surgery on 7/14/25

## 2025-07-10 ENCOUNTER — TELEPHONE (OUTPATIENT)
Dept: PRIMARY CARE CLINIC | Facility: CLINIC | Age: 89
End: 2025-07-10
Payer: COMMERCIAL

## 2025-07-10 NOTE — TELEPHONE ENCOUNTER
Copied from CRM #[id number]. Topic: General Inquiry - Patient Advice  >> Jul 10, 2025 12:03 PM Rajinder wrote:  .1MEDICALADVICE     Patient is calling for Medical Advice regarding:[facility] is calling in regards to getting her EKG results that was faxed over and is wondering if you can give her a call as soon as possible       Patient wants a call back or thru [facility], provide patient's call back phone number:Call back [phone] Merdi    Comments:    Please advise patient replies from provider may take up to 48 hours.  >> Jul 10, 2025  1:34 PM Med Assistant Doris wrote:    ----- Message -----  From: Rajinder Carter  Sent: 7/10/2025  12:04 PM CDT  To: Christoph Bhagat

## 2025-07-10 NOTE — TELEPHONE ENCOUNTER
No EKG done at visit and was not indicated at the time. Last EKG was 03/2024 with Dr. Zapata. Pt needs f/u with Cards if EKG needed. Attempted to contact [facility]. Was told by office that she is working from home and will let her know to call us back.

## 2025-07-10 NOTE — TELEPHONE ENCOUNTER
Dup msg. Pt also contacted office via phone on yesterday. Noted that pre op was faxed to 's office

## 2025-08-08 ENCOUNTER — TELEPHONE (OUTPATIENT)
Dept: OPTOMETRY | Facility: CLINIC | Age: 89
End: 2025-08-08
Payer: COMMERCIAL

## 2025-08-11 ENCOUNTER — OFFICE VISIT (OUTPATIENT)
Dept: OPTOMETRY | Facility: CLINIC | Age: 89
End: 2025-08-11
Payer: COMMERCIAL

## 2025-08-11 DIAGNOSIS — H52.4 PRESBYOPIA: ICD-10-CM

## 2025-08-11 DIAGNOSIS — Z13.5 GLAUCOMA SCREENING: ICD-10-CM

## 2025-08-11 DIAGNOSIS — Z96.1 PSEUDOPHAKIA OF BOTH EYES: Primary | ICD-10-CM

## 2025-08-11 PROCEDURE — 1157F ADVNC CARE PLAN IN RCRD: CPT | Mod: CPTII,S$GLB,, | Performed by: OPTOMETRIST

## 2025-08-11 PROCEDURE — 3288F FALL RISK ASSESSMENT DOCD: CPT | Mod: CPTII,S$GLB,, | Performed by: OPTOMETRIST

## 2025-08-11 PROCEDURE — 1159F MED LIST DOCD IN RCRD: CPT | Mod: CPTII,S$GLB,, | Performed by: OPTOMETRIST

## 2025-08-11 PROCEDURE — 92014 COMPRE OPH EXAM EST PT 1/>: CPT | Mod: S$GLB,,, | Performed by: OPTOMETRIST

## 2025-08-11 PROCEDURE — 1160F RVW MEDS BY RX/DR IN RCRD: CPT | Mod: CPTII,S$GLB,, | Performed by: OPTOMETRIST

## 2025-08-11 PROCEDURE — 99999 PR PBB SHADOW E&M-EST. PATIENT-LVL III: CPT | Mod: PBBFAC,,, | Performed by: OPTOMETRIST

## 2025-08-11 PROCEDURE — 92015 DETERMINE REFRACTIVE STATE: CPT | Mod: S$GLB,,, | Performed by: OPTOMETRIST

## 2025-08-11 PROCEDURE — 1101F PT FALLS ASSESS-DOCD LE1/YR: CPT | Mod: CPTII,S$GLB,, | Performed by: OPTOMETRIST

## 2025-08-15 ENCOUNTER — PATIENT MESSAGE (OUTPATIENT)
Dept: PRIMARY CARE CLINIC | Facility: CLINIC | Age: 89
End: 2025-08-15
Payer: COMMERCIAL

## 2025-08-15 DIAGNOSIS — G47.00 INSOMNIA, UNSPECIFIED TYPE: ICD-10-CM

## 2025-08-18 RX ORDER — TEMAZEPAM 30 MG/1
30 CAPSULE ORAL NIGHTLY PRN
Qty: 30 CAPSULE | Refills: 0 | Status: SHIPPED | OUTPATIENT
Start: 2025-08-18

## 2025-08-22 ENCOUNTER — PATIENT OUTREACH (OUTPATIENT)
Dept: ADMINISTRATIVE | Facility: HOSPITAL | Age: 89
End: 2025-08-22
Payer: COMMERCIAL

## 2025-08-25 DIAGNOSIS — R06.09 DOE (DYSPNEA ON EXERTION): ICD-10-CM

## 2025-08-25 DIAGNOSIS — I15.2 HYPERTENSION DUE TO ENDOCRINE DISORDER: Chronic | ICD-10-CM

## 2025-08-25 DIAGNOSIS — E78.5 HYPERLIPIDEMIA, UNSPECIFIED HYPERLIPIDEMIA TYPE: Chronic | ICD-10-CM

## 2025-08-25 DIAGNOSIS — E03.9 HYPOTHYROIDISM, UNSPECIFIED TYPE: Chronic | ICD-10-CM

## 2025-08-25 DIAGNOSIS — F41.9 ANXIETY AND DEPRESSION: ICD-10-CM

## 2025-08-25 DIAGNOSIS — F32.A ANXIETY AND DEPRESSION: ICD-10-CM

## 2025-08-25 RX ORDER — BUPROPION HYDROCHLORIDE 150 MG/1
150 TABLET ORAL DAILY
Qty: 90 TABLET | Refills: 2 | Status: SHIPPED | OUTPATIENT
Start: 2025-08-25

## 2025-09-04 RX ORDER — LEVOTHYROXINE SODIUM 175 UG/1
175 TABLET ORAL
Qty: 90 TABLET | Refills: 1 | Status: SHIPPED | OUTPATIENT
Start: 2025-09-04

## 2025-09-05 DIAGNOSIS — I10 HYPERTENSION, UNSPECIFIED TYPE: Chronic | ICD-10-CM

## 2025-09-05 RX ORDER — VALSARTAN 320 MG/1
320 TABLET ORAL DAILY
Qty: 90 TABLET | Refills: 1 | Status: SHIPPED | OUTPATIENT
Start: 2025-09-05

## (undated) DEVICE — UNDERGLOVES BIOGEL PI SIZE 7.5

## (undated) DEVICE — SYR 10CC LUER LOCK

## (undated) DEVICE — UNDERGLOVE BIOGEL PI SZ 6.5 LF

## (undated) DEVICE — SOL IRR NACL .9% 3000ML

## (undated) DEVICE — TRAY CYSTO BASIN OMC

## (undated) DEVICE — ADAPTER HOSE 10FT 8MM

## (undated) DEVICE — PACK CYSTO

## (undated) DEVICE — UNDERGLOVES BIOGEL PI SZ 7 LF

## (undated) DEVICE — SET CYSTO IRRIGATION UNIV SPIK

## (undated) DEVICE — GOWN X-LG STERILE BACK